# Patient Record
Sex: MALE | Race: WHITE | NOT HISPANIC OR LATINO | Employment: OTHER | URBAN - METROPOLITAN AREA
[De-identification: names, ages, dates, MRNs, and addresses within clinical notes are randomized per-mention and may not be internally consistent; named-entity substitution may affect disease eponyms.]

---

## 2017-01-03 ENCOUNTER — OFFICE VISIT (OUTPATIENT)
Dept: FAMILY MEDICINE | Facility: CLINIC | Age: 82
End: 2017-01-03
Payer: COMMERCIAL

## 2017-01-03 ENCOUNTER — TRANSFERRED RECORDS (OUTPATIENT)
Dept: HEALTH INFORMATION MANAGEMENT | Facility: CLINIC | Age: 82
End: 2017-01-03

## 2017-01-03 VITALS
DIASTOLIC BLOOD PRESSURE: 86 MMHG | TEMPERATURE: 97.7 F | OXYGEN SATURATION: 97 % | WEIGHT: 271.3 LBS | SYSTOLIC BLOOD PRESSURE: 122 MMHG | HEART RATE: 83 BPM | BODY MASS INDEX: 37.32 KG/M2

## 2017-01-03 DIAGNOSIS — Z87.820 SIGNIFICANT CLOSED HEAD TRAUMA WITHIN PAST 3 MONTHS: ICD-10-CM

## 2017-01-03 DIAGNOSIS — R11.0 NAUSEA: Primary | ICD-10-CM

## 2017-01-03 DIAGNOSIS — M54.2 CERVICALGIA: ICD-10-CM

## 2017-01-03 PROCEDURE — 99214 OFFICE O/P EST MOD 30 MIN: CPT | Performed by: FAMILY MEDICINE

## 2017-01-03 RX ORDER — MECLIZINE HYDROCHLORIDE 25 MG/1
25 TABLET ORAL EVERY 6 HOURS PRN
Qty: 30 TABLET | Refills: 3 | Status: SHIPPED | OUTPATIENT
Start: 2017-01-03 | End: 2017-06-20

## 2017-01-03 RX ORDER — TRAMADOL HYDROCHLORIDE 50 MG/1
50-100 TABLET ORAL EVERY 6 HOURS PRN
Qty: 20 TABLET | Refills: 0 | Status: SHIPPED | OUTPATIENT
Start: 2017-01-03 | End: 2017-06-20

## 2017-01-03 RX ORDER — MECLIZINE HYDROCHLORIDE 25 MG/1
25 TABLET ORAL EVERY 6 HOURS PRN
Qty: 30 TABLET | Refills: 3 | Status: SHIPPED | OUTPATIENT
Start: 2017-01-03 | End: 2017-01-03

## 2017-01-03 NOTE — PROGRESS NOTES
SUBJECTIVE:                                                    Issac Zelaya is a 81 year old male who presents to clinic today for the following health issues:    Wei here with sone and wife had a slip and fall on his own drive and hit occiput while on coumadin getting a subdural hematoma and residual ataxia and headache     Hospital Follow-up Visit:    Hospital/Nursing Home/IP Rehab Facility: [facility]  Date of Admission: 12/25/16  Date of Discharge: 12/28/16  Reason(s) for Admission: concussion  - nausea, severe headaches, stopped taking Oxycodone due to him making him sick, bleeding of the brain            Problems taking medications regularly:  None       Medication changes since discharge: None       Problems adhering to non-medication therapy:  None    Summary of hospitalization:  [facility] hospital discharge summary reviewed  Diagnostic Tests/Treatments reviewed.  Follow up needed: none  Other Healthcare Providers Involved in Patient s Care:         None  Update since discharge: fluctuating course. Ataxia still profound, headache still limitin     .Suture removal with 5 sutures removed from well adherant wound without signs of infection.   Injured by fall  CMS is intact. Sutures placed at [facility]  wound looks well adherant   A/P  special precautions include laterals tress and moisture stress for 7 days  Avoid wound stress and unnecessary moisture.  JM      Post Discharge Medication Reconciliation: discharge medications reconciled, continue medications without change.  Plan of care communicated with patient and family     Coding guidelines for this visit:  Type of Medical   Decision Making Face-to-Face Visit       within 7 Days of discharge Face-to-Face Visit        within 14 days of discharge   Moderate Complexity 10747 [id number]   High Complexity 40256 64027            Past Medical History   Diagnosis Date     Carotid artery disease (H)      Hypertension      Unspecified cerebral artery occlusion with  cerebral infarction      Coronary artery disease      Atrial fibrillation (H)        Past Surgical History   Procedure Laterality Date     Colonoscopy  9/24/2013     Dr. Yas CARMEN bypass graft othr,carotid       carotid stent       Family History   Problem Relation Age of Onset     HEART DISEASE Mother      DIABETES Father      Breast Cancer No family hx of        Social History   Substance Use Topics     Smoking status: Former Smoker     Quit date: 01/01/1982     Smokeless tobacco: Never Used     Alcohol Use: 0.0 oz/week     0 Standard drinks or equivalent per week      Comment: 2-3 drinks per week         Problem list and histories reviewed & adjusted, as indicated.  Additional history: as documented    BP Readings from Last 3 Encounters:   01/03/17 122/86   09/12/16 120/70   07/27/16 122/80    Wt Readings from Last 3 Encounters:   01/03/17 271 lb 4.8 oz (123.061 kg)   09/12/16 275 lb (124.739 kg)   07/27/16 273 lb 4.8 oz (123.968 kg)                    ROS:  Constitutional, HEENT, cardiovascular, pulmonary, gi and gu systems are negative, except as otherwise noted.    OBJECTIVE:                                                    /86 mmHg  Pulse 83  Temp(Src) 97.7  F (36.5  C) (Oral)  Wt 271 lb 4.8 oz (123.061 kg)  SpO2 97%  Body mass index is 37.32 kg/(m^2).  GENERAL: healthy, alert and no distress  EYES: Eyes grossly normal to inspection, PERRL and conjunctivae and sclerae normal  HENT: ear canals and TM's normal, nose and mouth without ulcers or lesions  NECK: no adenopathy, no asymmetry, masses, or scars and thyroid normal to palpation  RESP: lungs clear to auscultation - no rales, rhonchi or wheezes  CV: regular rate and rhythm, normal S1 S2, no S3 or S4, no murmur, click or rub, no peripheral edema and peripheral pulses strong  ABDOMEN: soft, nontender, no hepatosplenomegaly, no masses and bowel sounds normal  MS: no gross musculoskeletal defects noted, no edema  SKIN: no suspicious lesions  or rashes  NEURO: Normal strength and tone, mentation intact and speech normal  PSYCH: mentation appears normal, affect normal/bright    Ct scan showed the subdural: he follows at  [facility]      ASSESSMENT/PLAN:                                                    Has [facility] carotid stenosis      One week follow up to recheck sutrues and ataxia, Neurology will direct his rehab     (R11.0) Nausea  (primary encounter diagnosis)  Comment:   Plan: meclizine (ANTIVERT) 25 MG tablet,         DISCONTINUED: meclizine (ANTIVERT) 25 MG tablet        Post concussion    (Z87.820) Significant closed head trauma within past 3 months  Comment:   Plan: subdural care    (M54.2) Cervicalgia  Comment:   Plan: traMADol (ULTRAM) 50 MG tablet        whiplash              James Fajardo MD  [facility]\

## 2017-01-03 NOTE — NURSING NOTE
"Chief Complaint   Patient presents with     Hospital F/U       Initial /86 mmHg  Pulse 83  Temp(Src) 97.7  F (36.5  C) (Oral)  Wt 271 lb 4.8 oz (123.061 kg)  SpO2 97% Estimated body mass index is 37.32 kg/(m^2) as calculated from the following:    Height as of 9/12/16: 5' 11.5\" (1.816 m).    Weight as of this encounter: 271 lb 4.8 oz (123.061 kg).  BP completed using cuff size: eileen Camejo MA    "

## 2017-01-03 NOTE — MR AVS SNAPSHOT
After Visit Summary   1/3/2017    Issac Zelaya    MRN: [id number]           Patient Information     Date Of Birth          1935        Visit Information        Provider Department      1/3/2017 11:00 AM James Fajardo MD [facility]        Today's Diagnoses     Nausea    -  1     Significant closed head trauma within past 3 months         Cervicalgia            Follow-ups after your visit        Who to contact     If you have questions or need follow up information about today's clinic visit or your schedule please contact [facility] directly at [phone].  Normal or non-critical lab and imaging results will be communicated to you by [employer], letter or phone within 4 business days after the clinic has received the results. If you do not hear from us within 7 days, please contact the clinic through [employer] or phone. If you have a critical or abnormal lab result, we will notify you by phone as soon as possible.  Submit refill requests through [employer] or call your pharmacy and they will forward the refill request to us. Please allow 3 business days for your refill to be completed.          Additional Information About Your Visit        MyChart Information     [employer] gives you secure access to your electronic health record. If you see a primary care provider, you can also send messages to your care team and make appointments. If you have questions, please call your primary care clinic.  If you do not have a primary care provider, please call [phone] and they will assist you.        Care EveryWhere ID     This is your Care EveryWhere ID. This could be used by other organizations to access your [facility] medical records  [id number]        Your Vitals Were     Pulse Temperature Pulse Oximetry             83 97.7  F (36.5  C) (Oral) 97%          Blood Pressure from Last 3 Encounters:   01/03/17 122/86   09/12/16 120/70   07/27/16 122/80    Weight  from Last 3 Encounters:   01/03/17 271 lb 4.8 oz (123.061 kg)   09/12/16 275 lb (124.739 kg)   07/27/16 273 lb 4.8 oz (123.968 kg)              Today, you had the following     No orders found for display         Today's Medication Changes          These changes are accurate as of: 1/3/17 12:21 PM.  If you have any questions, ask your nurse or doctor.               Start taking these medicines.        Dose/Directions    meclizine 25 MG tablet   Commonly known as:  ANTIVERT   Used for:  Nausea   Started by:  James Fajardo MD        Dose:  25 mg   Take 1 tablet (25 mg) by mouth every 6 hours as needed for dizziness   Quantity:  30 tablet   Refills:  3       traMADol 50 MG tablet   Commonly known as:  ULTRAM   Used for:  Cervicalgia   Started by:  James Fajardo MD        Dose:   mg   Take 1-2 tablets ( mg) by mouth every 6 hours as needed for pain maximum 6 tablet(s) per day   Quantity:  20 tablet   Refills:  0            Where to get your medicines      These medications were sent to [facility] - [address] [address]     Phone:  [phone]    - meclizine 25 MG tablet      Some of these will need a paper prescription and others can be bought over the counter.  Ask your nurse if you have questions.     Bring a paper prescription for each of these medications    - traMADol 50 MG tablet             Primary Care Provider Office Phone # Fax #    James Fajardo [phone]       [address] [address]        Thank you!     Thank you for choosing [facility]  for your care. Our goal is always to provide you with excellent care. Hearing back from our patients is one way we can continue to improve our services. Please take a few minutes to complete the written survey that you may receive in the mail after your visit with us.  Thank you!             Your Updated Medication List - Protect others around you: Learn how to safely use, store and throw away your medicines at www.disposemymeds.org.          This list is accurate as of: 1/3/17 12:21 PM.  Always use your most recent med list.                   Brand Name Dispense Instructions for use    atenolol 50 MG tablet    TENORMIN    135 tablet    1 tab in morning.       atorvastatin 20 MG tablet    LIPITOR    90 tablet    Take 1 tablet (20 mg) by mouth daily       B-12 PO      1000 mg daily       CENTRUM PO      None Entered       clopidogrel 75 MG tablet    PLAVIX    90 tablet    Take 1 tablet (75 mg) by mouth daily       FISH OIL PO      None Entered       furosemide 20 MG tablet    LASIX    90 tablet    Take 1 tablet (20 mg) by mouth daily       lisinopril 5 MG tablet    PRINIVIL/ZESTRIL    90 tablet    Take 1 tablet (5 mg) by mouth daily       meclizine 25 MG tablet    ANTIVERT    30 tablet    Take 1 tablet (25 mg) by mouth every 6 hours as needed for dizziness       rivaroxaban ANTICOAGULANT 15 MG Tabs tablet    XARELTO    90 tablet    Take 1 tablet (15 mg) by mouth daily (with dinner)       traMADol 50 MG tablet    ULTRAM    20 tablet    Take 1-2 tablets ( mg) by mouth every 6 hours as needed for pain maximum 6 tablet(s) per day       VITAMIN D3 PO      Take 5,000 Units by mouth daily

## 2017-01-06 ENCOUNTER — DOCUMENTATION ONLY (OUTPATIENT)
Dept: CARE COORDINATION | Facility: CLINIC | Age: 82
End: 2017-01-06

## 2017-01-10 ENCOUNTER — OFFICE VISIT (OUTPATIENT)
Dept: FAMILY MEDICINE | Facility: CLINIC | Age: 82
End: 2017-01-10
Payer: COMMERCIAL

## 2017-01-10 VITALS
HEART RATE: 53 BPM | RESPIRATION RATE: 12 BRPM | TEMPERATURE: 97.5 F | WEIGHT: 266.8 LBS | BODY MASS INDEX: 36.7 KG/M2 | SYSTOLIC BLOOD PRESSURE: 128 MMHG | DIASTOLIC BLOOD PRESSURE: 74 MMHG

## 2017-01-10 DIAGNOSIS — E87.1 HYPONATREMIA: Primary | ICD-10-CM

## 2017-01-10 DIAGNOSIS — M54.2 NECK PAIN: ICD-10-CM

## 2017-01-10 LAB
ANION GAP SERPL CALCULATED.3IONS-SCNC: 4 MMOL/L (ref 3–14)
BUN SERPL-MCNC: 10 MG/DL (ref 7–30)
CALCIUM SERPL-MCNC: 9.5 MG/DL (ref 8.5–10.1)
CHLORIDE SERPL-SCNC: 104 MMOL/L (ref 94–109)
CO2 SERPL-SCNC: 30 MMOL/L (ref 20–32)
CREAT SERPL-MCNC: 1.03 MG/DL (ref 0.66–1.25)
GFR SERPL CREATININE-BSD FRML MDRD: 69 ML/MIN/1.7M2
GLUCOSE SERPL-MCNC: 103 MG/DL (ref 70–99)
POTASSIUM SERPL-SCNC: 4.5 MMOL/L (ref 3.4–5.3)
SODIUM SERPL-SCNC: 138 MMOL/L (ref 133–144)

## 2017-01-10 PROCEDURE — 80048 BASIC METABOLIC PNL TOTAL CA: CPT | Performed by: FAMILY MEDICINE

## 2017-01-10 PROCEDURE — 99213 OFFICE O/P EST LOW 20 MIN: CPT | Performed by: FAMILY MEDICINE

## 2017-01-10 PROCEDURE — 36415 COLL VENOUS BLD VENIPUNCTURE: CPT | Performed by: FAMILY MEDICINE

## 2017-01-10 RX ORDER — HYDROCODONE BITARTRATE AND ACETAMINOPHEN 5; 325 MG/1; MG/1
TABLET ORAL
Qty: 18 TABLET | Refills: 0 | Status: SHIPPED | OUTPATIENT
Start: 2017-01-10 | End: 2017-06-20

## 2017-01-10 NOTE — PROGRESS NOTES
SUBJECTIVE:                                                    Issac Zelaya is a 81 year old male who presents to clinic today for the following health issues:          Hospital Follow-up Visit:    He had low sodium post closed head trauma    Hospital/Nursing Home/IP Rehab Facility: [facility]  Date of Admission: 12/25/16  Date of Discharge: 12/28/16  Reason(s) for Admission: slipped on ice, concussion            Problems taking medications regularly:  None       Medication changes since discharge: yes       Problems adhering to non-medication therapy:  None      Summary of hospitalization:  [facility] discharge summary reviewed  Diagnostic Tests/Treatments reviewed.  Follow up needed: none  Other Healthcare Providers Involved in Patient s Care:         Specialist appointment - Neurology  Update since discharge: improved.     Post Discharge Medication Reconciliation: discharge medications reconciled, continue medications without change.  Plan of care communicated with patient     Coding guidelines for this visit:  Type of Medical   Decision Making Face-to-Face Visit       within 7 Days of discharge Face-to-Face Visit        within 14 days of discharge   Moderate Complexity 46543 93578   High Complexity 54191 95814                Problem list and histories reviewed & adjusted, as indicated.  Additional history: as documented    BP Readings from Last 3 Encounters:   01/10/17 128/74   01/03/17 122/86   09/12/16 120/70    Wt Readings from Last 3 Encounters:   01/10/17 266 lb 12.8 oz (121.02 kg)   01/03/17 271 lb 4.8 oz (123.061 kg)   09/12/16 275 lb (124.739 kg)                    ROS:  Constitutional, HEENT, cardiovascular, pulmonary, gi and gu systems are negative, except as otherwise noted.    OBJECTIVE:                                                    /74 mmHg  Pulse 53  Temp(Src) 97.5  F (36.4  C) (Oral)  Resp 12  Wt 266 lb 12.8 oz (121.02 kg)  Body mass index is 36.7 kg/(m^2).    GENERAL: healthy, alert and no distress  EYES: Eyes grossly normal to inspection, PERRL and conjunctivae and sclerae normal  HENT: ear canals and TM's normal, nose and mouth without ulcers or lesions  NECK: no adenopathy, no asymmetry, masses, or scars and thyroid normal to palpation  RESP: lungs clear to auscultation - no rales, rhonchi or wheezes  CV: regular rate and rhythm, normal S1 S2, no S3 or S4, no murmur, click or rub, no peripheral edema and peripheral pulses strong  ABDOMEN: soft, nontender, no hepatosplenomegaly, no masses and bowel sounds normal  MS: no gross musculoskeletal defects noted, no edema  SKIN: no suspicious lesions or rashes  NEURO: Normal strength and tone, mentation intact and speech normal  PSYCH: mentation appears normal, affect normal/bright         ASSESSMENT:                                                    Hyponatremia improving     PLAN:                                                    1. Hyponatremia  2 Closed head trauma: scalp sutures removed  - Basic metabolic panel  (Ca, Cl, CO2, Creat, Gluc, K, Na, BUN)    2. Neck pain  Doing better   - HYDROcodone-acetaminophen (NORCO) 5-325 MG per tablet; Take one three times daily as needed  Dispense: 18 tablet; Refill: 0            James Fajardo MD  [facility]

## 2017-01-10 NOTE — NURSING NOTE
"Chief Complaint   Patient presents with     Hospital F/U       Initial /74 mmHg  Pulse 53  Temp(Src) 97.5  F (36.4  C) (Oral)  Resp 12  Wt 266 lb 12.8 oz (121.02 kg) Estimated body mass index is 36.7 kg/(m^2) as calculated from the following:    Height as of 9/12/16: 5' 11.5\" (1.816 m).    Weight as of this encounter: 266 lb 12.8 oz (121.02 kg).  BP completed using cuff size: eileen Merchant CMA       "

## 2017-01-24 ENCOUNTER — OFFICE VISIT (OUTPATIENT)
Dept: FAMILY MEDICINE | Facility: CLINIC | Age: 82
End: 2017-01-24
Payer: COMMERCIAL

## 2017-01-24 VITALS
TEMPERATURE: 98.4 F | BODY MASS INDEX: 36.37 KG/M2 | RESPIRATION RATE: 12 BRPM | DIASTOLIC BLOOD PRESSURE: 73 MMHG | WEIGHT: 264.4 LBS | HEART RATE: 64 BPM | SYSTOLIC BLOOD PRESSURE: 133 MMHG

## 2017-01-24 DIAGNOSIS — I65.22 STENOSIS OF LEFT CAROTID ARTERY: ICD-10-CM

## 2017-01-24 DIAGNOSIS — R60.0 LOCALIZED EDEMA: Primary | ICD-10-CM

## 2017-01-24 DIAGNOSIS — I48.0 PAROXYSMAL ATRIAL FIBRILLATION (H): ICD-10-CM

## 2017-01-24 PROBLEM — Z87.820 SIGNIFICANT CLOSED HEAD TRAUMA WITHIN PAST 3 MONTHS: Status: ACTIVE | Noted: 2017-01-24

## 2017-01-24 PROBLEM — S06.5XAA SUBDURAL HEMATOMA (H): Status: ACTIVE | Noted: 2017-01-24

## 2017-01-24 PROCEDURE — 99214 OFFICE O/P EST MOD 30 MIN: CPT | Performed by: FAMILY MEDICINE

## 2017-01-24 RX ORDER — OMEGA-3 FATTY ACIDS/FISH OIL 300-1000MG
2 CAPSULE ORAL
COMMUNITY
End: 2023-02-23

## 2017-01-24 RX ORDER — MECLIZINE HYDROCHLORIDE 25 MG/1
25 TABLET ORAL
COMMUNITY
End: 2017-01-24

## 2017-01-24 RX ORDER — MULTIVIT-MIN/IRON FUM/FOLIC AC 7.5 MG-4
TABLET ORAL
COMMUNITY
End: 2017-06-27

## 2017-01-24 RX ORDER — LISINOPRIL 5 MG/1
5 TABLET ORAL
COMMUNITY
End: 2017-01-24

## 2017-01-24 RX ORDER — FUROSEMIDE 20 MG
20 TABLET ORAL
COMMUNITY
End: 2017-01-24

## 2017-01-24 RX ORDER — HYDROCODONE BITARTRATE AND ACETAMINOPHEN 5; 325 MG/1; MG/1
TABLET ORAL
COMMUNITY
Start: 2017-01-04 | End: 2017-01-24

## 2017-01-24 NOTE — NURSING NOTE
"Chief Complaint   Patient presents with     RECHECK     check excision site on head       Initial /73 mmHg  Pulse 64  Temp(Src) 98.4  F (36.9  C) (Oral)  Resp 12  Wt 264 lb 6.4 oz (119.931 kg) Estimated body mass index is 36.37 kg/(m^2) as calculated from the following:    Height as of 9/12/16: 5' 11.5\" (1.816 m).    Weight as of this encounter: 264 lb 6.4 oz (119.931 kg).  BP completed using cuff size: regular  Jose Merchant CMA       "

## 2017-01-24 NOTE — PROGRESS NOTES
SUBJECTIVE:                                                    Issac Zelaya is a 82 year old male who presents to clinic today for the following health issues:      SUBJECTIVE:    CC: Issac Zelaya is a 82 year old male who presents for follow up closed head trauma with subdural hematoma and a gratifying recovery.    HPI: he had low sodium but since corrected is generally doing well, balance improved, strength is symmetrical        PROBLEM LIST:                   Patient Active Problem List   Diagnosis     Obesity     CARDIOVASCULAR SCREENING; LDL GOAL LESS THAN 130     Generalized atherosclerosis     Hypertension goal BP (blood pressure) < 140/90     Carotid artery stenosis     Atrial fibrillation (H)     Aneurysm of thoracic aorta (H)     Localized edema     Subdural hematoma (H)     Significant closed head trauma within past 3 months       PAST MEDICAL HISTORY:                  Past Medical History   Diagnosis Date     Carotid artery disease (H)      Hypertension      Unspecified cerebral artery occlusion with cerebral infarction      Coronary artery disease      Atrial fibrillation (H)        PAST SURGICAL HISTORY:                  Past Surgical History   Procedure Laterality Date     Colonoscopy  9/24/2013     Dr. Yas CARMEN bypass graft othr,carotid       carotid stent       CURRENT MEDICATIONS:                  Current Outpatient Prescriptions   Medication Sig Dispense Refill     Multiple Vitamins-Minerals (MULTI-VITAMIN/MINERALS) TABS        omega 3 1000 MG CAPS Take 2 g by mouth       HYDROcodone-acetaminophen (NORCO) 5-325 MG per tablet Take one three times daily as needed 18 tablet 0     traMADol (ULTRAM) 50 MG tablet Take 1-2 tablets ( mg) by mouth every 6 hours as needed for pain maximum 6 tablet(s) per day 20 tablet 0     meclizine (ANTIVERT) 25 MG tablet Take 1 tablet (25 mg) by mouth every 6 hours as needed for dizziness 30 tablet 3     Cholecalciferol (VITAMIN D3 PO) Take 5,000 Units  by mouth daily       atenolol (TENORMIN) 50 MG tablet 1 tab in morning. 135 tablet 3     atorvastatin (LIPITOR) 20 MG tablet Take 1 tablet (20 mg) by mouth daily 90 tablet 3     furosemide (LASIX) 20 MG tablet Take 1 tablet (20 mg) by mouth daily 90 tablet 3     clopidogrel (PLAVIX) 75 MG tablet Take 1 tablet (75 mg) by mouth daily 90 tablet 3     lisinopril (PRINIVIL,ZESTRIL) 5 MG tablet Take 1 tablet (5 mg) by mouth daily 90 tablet 3     rivaroxaban ANTICOAGULANT (XARELTO) 15 MG TABS tablet Take 1 tablet (15 mg) by mouth daily (with dinner) 90 tablet 3     FISH OIL OR None Entered       B-12 OR 1000 mg daily       CENTRUM OR None Entered       [DISCONTINUED] furosemide (LASIX) 20 MG tablet Take 20 mg by mouth       [DISCONTINUED] lisinopril (PRINIVIL/ZESTRIL) 5 MG tablet Take 5 mg by mouth               FAMILY HISTORY:                   Family History   Problem Relation Age of Onset     HEART DISEASE Mother      DIABETES Father      Breast Cancer No family hx of        HEALTH MAINTENANCE:                    REVIEW OF OUTSIDE RECORDS: NO    REVIEW OF SYSTEMS:  C: NEGATIVE for fever, chills  E: NEGATIVE for vision changes   R: NEGATIVE for significant cough or SOB  CV: NEGATIVE for chest pain, palpitations   GI: NEGATIVE for nausea, abdominal pain, heartburn, or change in bowel habits  : NEGATIVE for frequency, dysuria, or hematuria  M: NEGATIVE for significant arthralgias or myalgia  N: NEGATIVE for weakness, dizziness or paresthesias or headache  NVS:no headache or balance issus  INTEG:no moles or new rashes  LYMPH:no nodes or night sweats    EXAM:    /73 mmHg  Pulse 64  Temp(Src) 98.4  F (36.9  C) (Oral)  Resp 12  Wt 264 lb 6.4 oz (119.931 kg)  GENERAL APPEARANCE: healthy, alert and no distress   EXAM:  GENERAL APPEARANCE: healthy, alert and no distress  EYES: EOMI,  PERRL  HENT: ear canals and TM's normal and nose and mouth without ulcers or lesions  RESP: lungs clear to auscultation - no rales,  rhonchi or wheezes  CV: regular rates and rhythm, normal S1 S2, no S3 or S4 and no murmur, click or rub -  ABDOMEN:  soft, nontender, no HSM or masses and bowel sounds normal  BACK:no tenderness or pain on straight let raise  Romberg's test is negative         ASSESSMENT/PLAN  1. Localized edema    2. Paroxysmal atrial fibrillation (H)    3. Stenosis of left carotid artery      See Neurology   Has pending ct head recheck     See here after that.                               I have discussed with patient the risks, benefits, medications, treatment options and modalities.   I have instructed the patient to call or schedule a follow-up appointment if any problems or failure to improve.

## 2017-02-01 ENCOUNTER — TELEPHONE (OUTPATIENT)
Dept: FAMILY MEDICINE | Facility: CLINIC | Age: 82
End: 2017-02-01

## 2017-02-13 ENCOUNTER — TRANSFERRED RECORDS (OUTPATIENT)
Dept: HEALTH INFORMATION MANAGEMENT | Facility: CLINIC | Age: 82
End: 2017-02-13

## 2017-02-20 ENCOUNTER — E-VISIT (OUTPATIENT)
Dept: FAMILY MEDICINE | Facility: CLINIC | Age: 82
End: 2017-02-20

## 2017-02-20 DIAGNOSIS — I62.00 SUBDURAL HEMORRHAGE (H): Primary | ICD-10-CM

## 2017-05-22 ENCOUNTER — TELEPHONE (OUTPATIENT)
Dept: CARDIOLOGY | Facility: CLINIC | Age: 82
End: 2017-05-22

## 2017-05-22 NOTE — TELEPHONE ENCOUNTER
Patient called from scheduling and transferred to Dr. Tavares team. Was questioning why he needed a CT done. Had several CT's and MRI's done since [place] due to a fall. Wondering if what we need is on these CT's.   Reviewed care everywhere.   CT ordered by Dr. 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   Patient was transferred back to scheduling to make annual OV with Dr. Clemens along with a CT (to be done prior to appointment).

## 2017-05-24 DIAGNOSIS — I71.20 ANEURYSM OF THORACIC AORTA (H): Primary | ICD-10-CM

## 2017-06-01 ENCOUNTER — HOSPITAL ENCOUNTER (OUTPATIENT)
Dept: CT IMAGING | Facility: CLINIC | Age: 82
Discharge: HOME | End: 2017-06-01
Attending: INTERNAL MEDICINE | Admitting: INTERNAL MEDICINE
Payer: COMMERCIAL

## 2017-06-01 DIAGNOSIS — I71.20 ANEURYSM OF THORACIC AORTA (H): ICD-10-CM

## 2017-06-01 LAB
CREAT BLD-MCNC: 1.1 MG/DL (ref 0.66–1.25)
GFR SERPL CREATININE-BSD FRML MDRD: 64 ML/MIN/1.7M2

## 2017-06-01 PROCEDURE — 25000128 H RX IP 250 OP 636: Performed by: RADIOLOGY

## 2017-06-01 PROCEDURE — 71275 CT ANGIOGRAPHY CHEST: CPT

## 2017-06-01 PROCEDURE — 82565 ASSAY OF CREATININE: CPT

## 2017-06-01 RX ORDER — IOPAMIDOL 755 MG/ML
500 INJECTION, SOLUTION INTRAVASCULAR ONCE
Status: COMPLETED | OUTPATIENT
Start: 2017-06-01 | End: 2017-06-01

## 2017-06-01 RX ADMIN — IOPAMIDOL 125 ML: 755 INJECTION, SOLUTION INTRAVENOUS at 10:46

## 2017-06-01 RX ADMIN — SODIUM CHLORIDE 80 ML: 9 INJECTION, SOLUTION INTRAVENOUS at 10:46

## 2017-06-20 ENCOUNTER — OFFICE VISIT (OUTPATIENT)
Dept: FAMILY MEDICINE | Facility: CLINIC | Age: 82
End: 2017-06-20
Payer: COMMERCIAL

## 2017-06-20 VITALS
SYSTOLIC BLOOD PRESSURE: 138 MMHG | HEART RATE: 65 BPM | RESPIRATION RATE: 14 BRPM | TEMPERATURE: 97.9 F | HEIGHT: 72 IN | BODY MASS INDEX: 36.87 KG/M2 | WEIGHT: 272.2 LBS | DIASTOLIC BLOOD PRESSURE: 88 MMHG | OXYGEN SATURATION: 93 %

## 2017-06-20 DIAGNOSIS — I48.20 CHRONIC ATRIAL FIBRILLATION (H): ICD-10-CM

## 2017-06-20 DIAGNOSIS — I69.990: ICD-10-CM

## 2017-06-20 DIAGNOSIS — I10 HYPERTENSION GOAL BP (BLOOD PRESSURE) < 140/90: Primary | ICD-10-CM

## 2017-06-20 DIAGNOSIS — I65.22 STENOSIS OF LEFT CAROTID ARTERY: ICD-10-CM

## 2017-06-20 PROBLEM — E66.01 MORBID OBESITY (H): Status: ACTIVE | Noted: 2017-06-20

## 2017-06-20 LAB
ALBUMIN SERPL-MCNC: 3.5 G/DL (ref 3.4–5)
ALP SERPL-CCNC: 45 U/L (ref 40–150)
ALT SERPL W P-5'-P-CCNC: 19 U/L (ref 0–70)
ANION GAP SERPL CALCULATED.3IONS-SCNC: 9 MMOL/L (ref 3–14)
AST SERPL W P-5'-P-CCNC: 13 U/L (ref 0–45)
BILIRUB SERPL-MCNC: 0.7 MG/DL (ref 0.2–1.3)
BUN SERPL-MCNC: 18 MG/DL (ref 7–30)
CALCIUM SERPL-MCNC: 9.1 MG/DL (ref 8.5–10.1)
CHLORIDE SERPL-SCNC: 105 MMOL/L (ref 94–109)
CHOLEST SERPL-MCNC: 112 MG/DL
CO2 SERPL-SCNC: 26 MMOL/L (ref 20–32)
CREAT SERPL-MCNC: 1.09 MG/DL (ref 0.66–1.25)
GFR SERPL CREATININE-BSD FRML MDRD: 65 ML/MIN/1.7M2
GLUCOSE SERPL-MCNC: 117 MG/DL (ref 70–99)
HDLC SERPL-MCNC: 34 MG/DL
LDLC SERPL CALC-MCNC: 50 MG/DL
NONHDLC SERPL-MCNC: 78 MG/DL
POTASSIUM SERPL-SCNC: 4.5 MMOL/L (ref 3.4–5.3)
PROT SERPL-MCNC: 7.2 G/DL (ref 6.8–8.8)
SODIUM SERPL-SCNC: 140 MMOL/L (ref 133–144)
TRIGL SERPL-MCNC: 139 MG/DL

## 2017-06-20 PROCEDURE — 80061 LIPID PANEL: CPT | Performed by: FAMILY MEDICINE

## 2017-06-20 PROCEDURE — 80053 COMPREHEN METABOLIC PANEL: CPT | Performed by: FAMILY MEDICINE

## 2017-06-20 PROCEDURE — 99214 OFFICE O/P EST MOD 30 MIN: CPT | Performed by: FAMILY MEDICINE

## 2017-06-20 PROCEDURE — 36415 COLL VENOUS BLD VENIPUNCTURE: CPT | Performed by: FAMILY MEDICINE

## 2017-06-20 NOTE — PROGRESS NOTES
SUBJECTIVE:                                                    Issac Zelaya is a 82 year old male who presents to clinic today for the following health issues:  SUBJECTIVE:    CC: Issac Zelaya is a 82 year old male who presents for asvd follow up and past head trauma six months ago     HPI: improving month by month, positive attitude, clear headed         PROBLEM LIST:                   Patient Active Problem List   Diagnosis     Obesity     CARDIOVASCULAR SCREENING; LDL GOAL LESS THAN 130     Generalized atherosclerosis     Hypertension goal BP (blood pressure) < 140/90     Carotid artery stenosis     Atrial fibrillation (H)     Aneurysm of thoracic aorta (H)     Localized edema     Subdural hematoma (H)     Significant closed head trauma within past 3 months       PAST MEDICAL HISTORY:                  Past Medical History:   Diagnosis Date     Atrial fibrillation (H)      Carotid artery disease (H)      Coronary artery disease      Hypertension      Unspecified cerebral artery occlusion with cerebral infarction        PAST SURGICAL HISTORY:                  Past Surgical History:   Procedure Laterality Date     C BYPASS GRAFT OTHR,CAROTID      carotid stent     COLONOSCOPY  9/24/2013    Dr. Yas LAMB       CURRENT MEDICATIONS:                  Current Outpatient Prescriptions   Medication Sig Dispense Refill     Multiple Vitamins-Minerals (MULTI-VITAMIN/MINERALS) TABS        omega 3 1000 MG CAPS Take 2 g by mouth       atenolol (TENORMIN) 50 MG tablet 1 tab in morning. 135 tablet 3     atorvastatin (LIPITOR) 20 MG tablet Take 1 tablet (20 mg) by mouth daily 90 tablet 3     furosemide (LASIX) 20 MG tablet Take 1 tablet (20 mg) by mouth daily 90 tablet 3     clopidogrel (PLAVIX) 75 MG tablet Take 1 tablet (75 mg) by mouth daily 90 tablet 3     lisinopril (PRINIVIL,ZESTRIL) 5 MG tablet Take 1 tablet (5 mg) by mouth daily 90 tablet 3     rivaroxaban ANTICOAGULANT (XARELTO) 15 MG TABS tablet Take 1 tablet (15  "mg) by mouth daily (with dinner) 90 tablet 3     FISH OIL OR None Entered       B-12 OR 1000 mg daily       CENTRUM OR None Entered               FAMILY HISTORY:                   Family History   Problem Relation Age of Onset     HEART DISEASE Mother      DIABETES Father      Breast Cancer No family hx of        HEALTH MAINTENANCE:                    REVIEW OF OUTSIDE RECORDS: NO    REVIEW OF SYSTEMS:  C: NEGATIVE for fever, chills  E: NEGATIVE for vision changes   R: NEGATIVE for significant cough or SOB  CV: NEGATIVE for chest pain, palpitations   GI: NEGATIVE for nausea, abdominal pain, heartburn, or change in bowel habits  : NEGATIVE for frequency, dysuria, or hematuria  M: NEGATIVE for significant arthralgias or myalgia  N: remarkable for weakness, dizziness or paresthesias or headache  NVS:no headache or balance issus  INTEG:no moles or new rashes  LYMPH:no nodes or night sweats    EXAM:    /86 (BP Location: Left arm, Patient Position: Chair, Cuff Size: Adult Large)  Pulse 65  Temp 97.9  F (36.6  C) (Oral)  Resp 14  Ht 5' 11.5\" (1.816 m)  Wt 272 lb 3.2 oz (123.5 kg)  SpO2 93%  BMI 37.44 kg/m2  GENERAL APPEARANCE: healthy, alert and no distress   EXAM:  GENERAL APPEARANCE: healthy, alert and no distress  EYES: EOMI,  PERRL  HENT: ear canals and TM's normal and nose and mouth without ulcers or lesions  NECK: no adenopathy, no asymmetry, masses, or scars and thyroid normal to palpation  RESP: lungs clear to auscultation - no rales, rhonchi or wheezes  CV: regular rates and rhythm, normal S1 S2, no S3 or S4 and no murmur, click or rub -  ABDOMEN:  soft, nontender, no HSM or masses and bowel sounds normal  MS: extremities normal- no gross deformities noted, no evidence of inflammation in joints, FROM in all extremities.  SKIN: no suspicious lesions or rashes  NEURO: mentation intact and proprioception decreased since his head injury   PSYCH: mentation appears normal and affect " normal/bright  LYMPHATICS: No axillary, cervical, inguinal, or supraclavicular nodes  BACK:no tenderness or pain on straight let raise           ASSESSMENT/PLAN        (I10) Hypertension goal BP (blood pressure) < 140/90  (primary encounter diagnosis)  Comment: labile, has atrial fib and carotid stent   Plan: image the stent     (I65.22) Stenosis of left carotid artery  Comment: stent    Plan: US Carotid Bilateral            (I48.2) Chronic atrial fibrillation (H)  Comment:   Plan: Lipid panel reflex to direct LDL, Comprehensive        metabolic panel        anticoagulation    (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   I have instructed the patient to call or schedule a follow-up appointment if any problems or failure to improve.

## 2017-06-20 NOTE — NURSING NOTE
"Chief Complaint   Patient presents with     lab work     medication refills       Initial /86 (BP Location: Left arm, Patient Position: Chair, Cuff Size: Adult Large)  Pulse 65  Temp 97.9  F (36.6  C) (Oral)  Resp 14  Ht 5' 11.5\" (1.816 m)  Wt 272 lb 3.2 oz (123.5 kg)  SpO2 93%  BMI 37.44 kg/m2 Estimated body mass index is 37.44 kg/(m^2) as calculated from the following:    Height as of this encounter: 5' 11.5\" (1.816 m).    Weight as of this encounter: 272 lb 3.2 oz (123.5 kg).  Medication Reconciliation: complete   Jose Merchant CMA       "

## 2017-06-20 NOTE — MR AVS SNAPSHOT
After Visit Summary   6/20/2017    Issac Zelaya    MRN: [id number]           Patient Information     Date Of Birth          1935        Visit Information        Provider Department      6/20/2017 9:00 AM James Fajardo MD [facility]        Today's Diagnoses     Hypertension goal BP (blood pressure) < 140/90    -  1    Stenosis of left carotid artery        Chronic atrial fibrillation (H)        Apraxia following cerebrovascular disease            Follow-ups after your visit        Your next 10 appointments already scheduled     Jun 27, 2017 11:15 AM CDT   Return Visit with Dick Clemens MD   [facility] [place] ([facility])    [address] [place], [place] [id number]  [facility] [id number] [phone]              Future tests that were ordered for you today     Open Future Orders        Priority Expected Expires Ordered    US Carotid Bilateral Routine  6/20/2018 6/20/2017            Who to contact     If you have questions or need follow up information about today's clinic visit or your schedule please contact [facility] directly at [phone].  Normal or non-critical lab and imaging results will be communicated to you by Followaphart, letter or phone within 4 business days after the clinic has received the results. If you do not hear from us within 7 days, please contact the clinic through Followaphart or phone. If you have a critical or abnormal lab result, we will notify you by phone as soon as possible.  Submit refill requests through Emergent Views or call your pharmacy and they will forward the refill request to us. Please allow 3 business days for your refill to be completed.          Additional Information About Your Visit        MyChart Information     Emergent Views gives you secure access to your electronic health record. If you see a primary care provider, you can also send messages to your care team and make appointments.  "If you have questions, please call your primary care clinic.  If you do not have a primary care provider, please call [phone] and they will assist you.        Care EveryWhere ID     This is your Care EveryWhere ID. This could be used by other organizations to access your [place] medical records  [id number]        Your Vitals Were     Pulse Temperature Respirations Height Pulse Oximetry BMI (Body Mass Index)    65 97.9  F (36.6  C) (Oral) 14 5' 11.5\" (1.816 m) 93% 37.44 kg/m2       Blood Pressure from Last 3 Encounters:   06/20/17 138/88   01/24/17 133/73   01/10/17 128/74    Weight from Last 3 Encounters:   06/20/17 272 lb 3.2 oz (123.5 kg)   01/24/17 264 lb 6.4 oz (119.9 kg)   01/10/17 266 lb 12.8 oz (121 kg)              We Performed the Following     Comprehensive metabolic panel     Lipid panel reflex to direct LDL        Primary Care Provider Office Phone # Fax #    James Fajardo [phone]       [address] [address]        Thank you!     Thank you for choosing [facility]  for your care. Our goal is always to provide you with excellent care. Hearing back from our patients is one way we can continue to improve our services. Please take a few minutes to complete the written survey that you may receive in the mail after your visit with us. Thank you!             Your Updated Medication List - Protect others around you: Learn how to safely use, store and throw away your medicines at www.disposemymeds.org.          This list is accurate as of: 6/20/17  9:53 AM.  Always use your most recent med list.                   Brand Name Dispense Instructions for use    atenolol 50 MG tablet    TENORMIN    135 tablet    1 tab in morning.       atorvastatin 20 MG tablet    LIPITOR    90 tablet    Take 1 tablet (20 mg) by mouth daily       B-12 PO      1000 mg daily       * MULTI-VITAMIN/MINERALS Tabs          * CENTRUM PO      " None Entered       clopidogrel 75 MG tablet    PLAVIX    90 tablet    Take 1 tablet (75 mg) by mouth daily       * omega 3 1000 MG Caps      Take 2 g by mouth       * FISH OIL PO      None Entered       furosemide 20 MG tablet    LASIX    90 tablet    Take 1 tablet (20 mg) by mouth daily       lisinopril 5 MG tablet    PRINIVIL/ZESTRIL    90 tablet    Take 1 tablet (5 mg) by mouth daily       rivaroxaban ANTICOAGULANT 15 MG Tabs tablet    XARELTO    90 tablet    Take 1 tablet (15 mg) by mouth daily (with dinner)       * Notice:  This list has 4 medication(s) that are the same as other medications prescribed for you. Read the directions carefully, and ask your doctor or other care provider to review them with you.

## 2017-06-23 ENCOUNTER — HOSPITAL ENCOUNTER (OUTPATIENT)
Dept: ULTRASOUND IMAGING | Facility: CLINIC | Age: 82
Discharge: HOME | End: 2017-06-23
Attending: FAMILY MEDICINE | Admitting: FAMILY MEDICINE
Payer: COMMERCIAL

## 2017-06-23 DIAGNOSIS — I65.22 STENOSIS OF LEFT CAROTID ARTERY: ICD-10-CM

## 2017-06-23 PROCEDURE — 93880 EXTRACRANIAL BILAT STUDY: CPT

## 2017-06-27 ENCOUNTER — OFFICE VISIT (OUTPATIENT)
Dept: CARDIOLOGY | Facility: CLINIC | Age: 82
End: 2017-06-27
Payer: COMMERCIAL

## 2017-06-27 VITALS
WEIGHT: 273.2 LBS | HEIGHT: 72 IN | DIASTOLIC BLOOD PRESSURE: 70 MMHG | HEART RATE: 77 BPM | BODY MASS INDEX: 37 KG/M2 | SYSTOLIC BLOOD PRESSURE: 116 MMHG

## 2017-06-27 DIAGNOSIS — E78.5 HYPERLIPIDEMIA LDL GOAL <100: ICD-10-CM

## 2017-06-27 DIAGNOSIS — I65.22 STENOSIS OF LEFT CAROTID ARTERY: ICD-10-CM

## 2017-06-27 DIAGNOSIS — S06.5XAA SUBDURAL HEMATOMA (H): ICD-10-CM

## 2017-06-27 DIAGNOSIS — I10 ESSENTIAL HYPERTENSION, BENIGN: ICD-10-CM

## 2017-06-27 DIAGNOSIS — I10 HYPERTENSION GOAL BP (BLOOD PRESSURE) < 140/90: Primary | ICD-10-CM

## 2017-06-27 DIAGNOSIS — E66.01 MORBID OBESITY DUE TO EXCESS CALORIES (H): ICD-10-CM

## 2017-06-27 DIAGNOSIS — I10 ESSENTIAL HYPERTENSION WITH GOAL BLOOD PRESSURE LESS THAN 140/90: ICD-10-CM

## 2017-06-27 DIAGNOSIS — G47.33 OSA (OBSTRUCTIVE SLEEP APNEA): ICD-10-CM

## 2017-06-27 DIAGNOSIS — I48.20 CHRONIC ATRIAL FIBRILLATION (H): ICD-10-CM

## 2017-06-27 DIAGNOSIS — I71.20 THORACIC AORTIC ANEURYSM WITHOUT RUPTURE (H): ICD-10-CM

## 2017-06-27 PROCEDURE — 99214 OFFICE O/P EST MOD 30 MIN: CPT | Performed by: INTERNAL MEDICINE

## 2017-06-27 RX ORDER — ATORVASTATIN CALCIUM 20 MG/1
20 TABLET, FILM COATED ORAL DAILY
Qty: 90 TABLET | Refills: 3 | Status: SHIPPED | OUTPATIENT
Start: 2017-06-27 | End: 2018-08-02

## 2017-06-27 RX ORDER — FUROSEMIDE 20 MG
20 TABLET ORAL DAILY
Qty: 90 TABLET | Refills: 3 | Status: SHIPPED | OUTPATIENT
Start: 2017-06-27 | End: 2018-08-02

## 2017-06-27 RX ORDER — DULOXETIN HYDROCHLORIDE 60 MG/1
CAPSULE, DELAYED RELEASE ORAL DAILY
COMMUNITY
End: 2020-11-05

## 2017-06-27 RX ORDER — CLOPIDOGREL BISULFATE 75 MG/1
75 TABLET ORAL DAILY
Qty: 90 TABLET | Refills: 3 | Status: SHIPPED | OUTPATIENT
Start: 2017-06-27 | End: 2018-08-02

## 2017-06-27 RX ORDER — LISINOPRIL 5 MG/1
5 TABLET ORAL DAILY
Qty: 90 TABLET | Refills: 3 | Status: SHIPPED | OUTPATIENT
Start: 2017-06-27 | End: 2018-08-02

## 2017-06-27 RX ORDER — ATENOLOL 50 MG/1
TABLET ORAL
Qty: 135 TABLET | Refills: 3 | Status: SHIPPED | OUTPATIENT
Start: 2017-06-27 | End: 2018-08-02

## 2017-06-27 NOTE — MR AVS SNAPSHOT
After Visit Summary   6/27/2017    Issac Zelaya    MRN: [id number]           Patient Information     Date Of Birth          1935        Visit Information        Provider Department      6/27/2017 11:15 AM Dick Clemens MD [facility]        Today's Diagnoses     Hypertension goal BP (blood pressure) < 140/90    -  1    Morbid obesity due to excess calories (H)        Thoracic aortic aneurysm without rupture (H)        Stenosis of left carotid artery        Post fall => head tauma => SDH => [facility] hospital        Hyperlipidemia LDL goal <100        Stenosis of left carotid artery        DOMINGO (obstructive sleep apnea)        Essential hypertension, benign        Chronic atrial fibrillation (H)        Essential hypertension with goal blood pressure less than 140/90           Follow-ups after your visit        Additional Services     Follow-Up with Cardiologist                 Future tests that were ordered for you today     Open Future Orders        Priority Expected Expires Ordered    Follow-Up with Cardiologist Routine 6/27/2018 11/9/2018 6/27/2017            Who to contact     If you have questions or need follow up information about today's clinic visit or your schedule please contact [facility] directly at [phone].  Normal or non-critical lab and imaging results will be communicated to you by MyChart, letter or phone within 4 business days after the clinic has received the results. If you do not hear from us within 7 days, please contact the clinic through MyChart or phone. If you have a critical or abnormal lab result, we will notify you by phone as soon as possible.  Submit refill requests through TellFi or call your pharmacy and they will forward the refill request to us. Please allow 3 business days for your refill to be completed.          Additional Information About Your Visit        MyChart  "Information     [facility] gives you secure access to your electronic health record. If you see a primary care provider, you can also send messages to your care team and make appointments. If you have questions, please call your primary care clinic.  If you do not have a primary care provider, please call [phone] and they will assist you.        Care EveryWhere ID     This is your Care EveryWhere ID. This could be used by other organizations to access your [place] medical records  [id number]        Your Vitals Were     Pulse Height BMI (Body Mass Index)             77 1.816 m (5' 11.5\") 37.57 kg/m2          Blood Pressure from Last 3 Encounters:   06/27/17 116/70   06/20/17 138/88   01/24/17 133/73    Weight from Last 3 Encounters:   06/27/17 123.9 kg (273 lb 3.2 oz)   06/20/17 123.5 kg (272 lb 3.2 oz)   01/24/17 119.9 kg (264 lb 6.4 oz)                 Where to get your medicines      These medications were sent to [facility] [address] [address]     Phone:  [phone]     atenolol 50 MG tablet    atorvastatin 20 MG tablet    clopidogrel 75 MG tablet    furosemide 20 MG tablet    lisinopril 5 MG tablet    rivaroxaban ANTICOAGULANT 15 MG Tabs tablet          Primary Care Provider Office Phone # Fax #    James Acosta Fajardo [phone]       [address] [address]        Equal Access to Services     LIZETTE ABEL AH: Hadii aad ku hadasho Soomaali, waaxda luqadaha, qaybta kaalmada adeegyada, geraldine hickey. So [facility] [phone].    ATENCIÓN: Si habla español, tiene a spear disposición servicios gratuitos de asistencia lingüística. Llame al [phone].    We comply with applicable federal civil rights laws and [place] laws. We do not discriminate on the basis of race, color, national origin, age, disability sex, sexual orientation or gender " identity.            Thank you!     Thank you for choosing [facility] [place]  for your care. Our goal is always to provide you with excellent care. Hearing back from our patients is one way we can continue to improve our services. Please take a few minutes to complete the written survey that you may receive in the mail after your visit with us. Thank you!             Your Updated Medication List - Protect others around you: Learn how to safely use, store and throw away your medicines at www.disposemymeds.org.          This list is accurate as of: 6/27/17 12:00 PM.  Always use your most recent med list.                   Brand Name Dispense Instructions for use Diagnosis    atenolol 50 MG tablet    TENORMIN    135 tablet    1 tab in morning.    Essential hypertension with goal blood pressure less than 140/90, DOMINGO (obstructive sleep apnea), Essential hypertension, benign, Chronic atrial fibrillation (H)       atorvastatin 20 MG tablet    LIPITOR    90 tablet    Take 1 tablet (20 mg) by mouth daily    Hyperlipidemia LDL goal <100, Stenosis of left carotid artery       B-12 PO      1000 mg daily        CENTRUM PO      None Entered        clopidogrel 75 MG tablet    PLAVIX    90 tablet    Take 1 tablet (75 mg) by mouth daily    DOMINGO (obstructive sleep apnea), Essential hypertension, benign, Hypertension goal BP (blood pressure) < 140/90, Chronic atrial fibrillation (H)       DULoxetine 60 MG EC capsule    CYMBALTA     Take by mouth daily        furosemide 20 MG tablet    LASIX    90 tablet    Take 1 tablet (20 mg) by mouth daily    DOMINGO (obstructive sleep apnea)       lisinopril 5 MG tablet    PRINIVIL/ZESTRIL    90 tablet    Take 1 tablet (5 mg) by mouth daily    Essential hypertension, benign, DOMINGO (obstructive sleep apnea), Hypertension goal BP (blood pressure) < 140/90, Chronic atrial fibrillation (H)       omega 3 1000 MG Caps      Take 2 g by mouth        rivaroxaban ANTICOAGULANT 15  MG Tabs tablet    XARELTO    90 tablet    Take 1 tablet (15 mg) by mouth daily (with dinner)    Chronic atrial fibrillation (H), Essential hypertension, benign, Hypertension goal BP (blood pressure) < 140/90, DOMINGO (obstructive sleep apnea)

## 2017-06-27 NOTE — PROGRESS NOTES
HPI and Plan:   See dictation        No orders of the defined types were placed in this encounter.    Orders Placed This Encounter   Medications     DULoxetine (CYMBALTA) 60 MG EC capsule     Sig: Take by mouth daily     Medications Discontinued During This Encounter   Medication Reason     Multiple Vitamins-Minerals (MULTI-VITAMIN/MINERALS) TABS Medication Reconciliation Clean Up     FISH OIL OR Medication Reconciliation Clean Up         Encounter Diagnosis   Name Primary?     Hypertension goal BP (blood pressure) < 140/90 Yes       CURRENT MEDICATIONS:  Current Outpatient Prescriptions   Medication Sig Dispense Refill     DULoxetine (CYMBALTA) 60 MG EC capsule Take by mouth daily       omega 3 1000 MG CAPS Take 2 g by mouth       atenolol (TENORMIN) 50 MG tablet 1 tab in morning. 135 tablet 3     atorvastatin (LIPITOR) 20 MG tablet Take 1 tablet (20 mg) by mouth daily 90 tablet 3     furosemide (LASIX) 20 MG tablet Take 1 tablet (20 mg) by mouth daily 90 tablet 3     clopidogrel (PLAVIX) 75 MG tablet Take 1 tablet (75 mg) by mouth daily 90 tablet 3     lisinopril (PRINIVIL,ZESTRIL) 5 MG tablet Take 1 tablet (5 mg) by mouth daily 90 tablet 3     rivaroxaban ANTICOAGULANT (XARELTO) 15 MG TABS tablet Take 1 tablet (15 mg) by mouth daily (with dinner) 90 tablet 3     B-12 OR 1000 mg daily       CENTRUM OR None Entered         ALLERGIES     Allergies   Allergen Reactions     Epinephrine Shortness Of Breath     Very rapid heartrate     Dabigatran Itching       PAST MEDICAL HISTORY:  Past Medical History:   Diagnosis Date     Atrial fibrillation (H)      Carotid artery disease (H)      Coronary artery disease      Hypertension      Unspecified cerebral artery occlusion with cerebral infarction        PAST SURGICAL HISTORY:  Past Surgical History:   Procedure Laterality Date     C BYPASS GRAFT OTHR,CAROTID      carotid stent     COLONOSCOPY  9/24/2013    Dr. Yas LAMB       FAMILY HISTORY:  Family History   Problem  "Relation Age of Onset     HEART DISEASE Mother      DIABETES Father      Breast Cancer No family hx of        SOCIAL HISTORY:  Social History     Social History     Marital status:      Spouse name: N/A     Number of children: N/A     Years of education: N/A     Social History Main Topics     Smoking status: Former Smoker     Quit date: 1/1/1982     Smokeless tobacco: Never Used     Alcohol use 0.0 oz/week     0 Standard drinks or equivalent per week      Comment: 2-3 drinks per week     Drug use: No     Sexual activity: Not Currently     Partners: Female     Other Topics Concern     Caffeine Concern Yes     3 cups daily     Weight Concern Yes     Special Diet No     Exercise Yes     golfing 1 day per week     Social History Narrative         Review of Systems:  Skin:  Negative       Eyes:  Positive for glasses    ENT:  Negative      Respiratory:  Positive for dyspnea on exertion;CPAP;sleep apnea     Cardiovascular:    edema;Positive for improved  Gastroenterology: Negative      Genitourinary:  not assessed      Musculoskeletal:  Positive for joint stiffness;joint swelling;joint pain    Neurologic:  Positive for numbness or tingling of feet;numbness or tingling of hands occ  Psychiatric:  Negative      Heme/Lymph/Imm:  Negative      Endocrine:  Negative        Physical Exam:  Vitals: /70  Pulse 77  Ht 1.816 m (5' 11.5\")  Wt 123.9 kg (273 lb 3.2 oz)  BMI 37.57 kg/m2    Constitutional:           Skin:           Head:           Eyes:           ENT:           Neck:           Chest:             Cardiac:                    Abdomen:           Vascular:                                          Extremities and Back:                 Neurological:             Recent Lab Results:  LIPID RESULTS:  Lab Results   Component Value Date    CHOL 112 06/20/2017    HDL 34 (L) 06/20/2017    LDL 50 06/20/2017    TRIG 139 06/20/2017    CHOLHDLRATIO 2.7 06/29/2015       LIVER ENZYME RESULTS:  Lab Results   Component Value " Date    AST 13 06/20/2017    ALT 19 06/20/2017       CBC RESULTS:  Lab Results   Component Value Date    WBC 7.6 05/13/2014    RBC 4.76 05/13/2014    HGB 16.2 05/13/2014    HCT 46.5 05/13/2014    MCV 98 05/13/2014    MCH 34.0 (H) 05/13/2014    MCHC 34.8 05/13/2014    RDW 13.5 05/13/2014     05/13/2014       BMP RESULTS:  Lab Results   Component Value Date     06/20/2017    POTASSIUM 4.5 06/20/2017    CHLORIDE 105 06/20/2017    CO2 26 06/20/2017    ANIONGAP 9 06/20/2017     (H) 06/20/2017    BUN 18 06/20/2017    CR 1.09 06/20/2017    GFRESTIMATED 65 06/20/2017    GFRESTBLACK 78 06/20/2017    FILIPE 9.1 06/20/2017        A1C RESULTS:  Lab Results   Component Value Date    A1C 5.6 04/15/2015       INR RESULTS:  No results found for: INR        CC  No referring provider defined for this encounter.

## 2017-06-27 NOTE — LETTER
DATE OF VISIT:  06/27/2017    James Fajardo MD  [id number] [facility] [id number]    Karthik Zelaya came today to review his longstanding relationship with chronic atrial fibrillation and hypertension, carotid artery disease with previous stenting back in [place] at [facility] in 2008.  The patient had a horrible fall this past January.  He was admitted to [facility] with severe scalp injury and a component of subdural hematoma.  He did not need surgical intervention but it was touch-and-go for awhile. He subsequently recovered with what he thinks is only minimal post-concussion syndrome issues.      At the time, he was on Plavix for his carotid stenosis - stenting intervention, and Xarelto 15 mg a day because of his chronic atrial fibrillation.  Subsequently he was placed back on both of these anticoagulants for the indicated reasons, and happily has not had any complications.      He denies chest pain, palpitations, dizziness or syncope.  His head trauma was from slipping on the ice.  Fortunately, nothing like that has happened subsequently.      Review of systems is listed in Epic.  I endorsed the findings including chronic shortness of breath on exertion and sleep apnea, although both are stable.  He has had chronic lower leg edema, but somewhat improved recently.  Upper and lower GI negative,  negative, musculoskeletal positive mild achiness of the joints, some tingling of the feet, and otherwise negative.      His current medications are Plavix 75 mg a day, Xarelto 15 mg a day, lisinopril 5 mg a day, Tenormin 50 mg daily, atorvastatin 20 mg at bedtime, and omega-3 fatty acids, B12 and vitamins.      His physical exam shows a moderately overweight male at 273 pounds, blood pressure 116/70, heart rate 70 beats per minute, and mildly irregular.  Head is normal.  He had no neck vein distention or bruits.  Heart was irregular without gallop or murmur.  Lungs clear.  Abdomen soft,  pendulous, nontender without pain or mass.  Extremities showed bilateral trace edema.  Neurologically, he is intact.      I discussed at length the indications for Xarelto with regard to his atrial fibrillation and with his chronic carotid disease and stenting.  One could certainly make an argument that he does not need Plavix in the long run.    I encouraged him to think about a neurology assessment, it has been many years since that assessment, but he has been comfortable on the Plavix and Xarelto, and would not have had problems had he not fallen.      He is taking Xarelto for his atrial fibrillation which is nicely controlled.  He is normotensive.  He looks well and feels well.      We agreed that for the time being, he would stay on the two anticoagulants, but should he have any further bleeding issues, one would at least consider stopping the Plavix and, if need be, the Xarelto.     IMPRESSION:   1.  Post head trauma, resolved subdural hematoma.   2.  Indication for Xarelto is chronic atrial fibrillation.  Indication for Plavix presumably is remote coronary disease and carotid stenting.  One could certainly justify stopping Plavix should there be ongoing concern.   3.  No obvious signs of angina or heart failure.      PLAN:  I will see him in a year.  I have reviewed his care to you.  His LDL is at goal at 50.  His renal function is normal.  Hemoglobin A1c 5.6.  Minus the fall, he was doing pretty well.  If you have any questions, concerns or disagreements, give me a call.     Over half an hour was spent reviewing multiple records, imaging, EKGs, monitors, medications, medication side effects, complications and multiple blood work assessments.     Thank you for allowing me to participate in the care of your patient.    Sincerely,     TEOFILO CASTILLO MD     [facility]

## 2017-07-11 ENCOUNTER — TELEPHONE (OUTPATIENT)
Dept: CARDIOLOGY | Facility: CLINIC | Age: 82
End: 2017-07-11

## 2017-07-11 NOTE — TELEPHONE ENCOUNTER
Phone call to patient to verify if dr. Clemens reviewed with him CT of chest. Dictation was down on 6/27 when he saw Dr. Clemens so there was no documention that it was reviewed. He states that indeed it was reviewed.

## 2017-07-13 ENCOUNTER — TRANSFERRED RECORDS (OUTPATIENT)
Dept: HEALTH INFORMATION MANAGEMENT | Facility: CLINIC | Age: 82
End: 2017-07-13

## 2017-07-21 ENCOUNTER — TRANSFERRED RECORDS (OUTPATIENT)
Dept: HEALTH INFORMATION MANAGEMENT | Facility: CLINIC | Age: 82
End: 2017-07-21

## 2017-08-01 NOTE — PROGRESS NOTES
[place] [id number]  Provider:  Dick Clemens  Patient:  Issac Zelaya  MRN:  [id number]  :  1935  Work type:  Cardiology Consult    Cc:  James Santana    DATE OF VISIT:  2017      Dear [place]:    Karthik Zelaya came today to review his longstanding relationship with chronic atrial fibrillation and hypertension, carotid artery disease with previous stenting back in [place] at [facility] in .  The patient had a horrible fall this past January.  He was admitted to [facility] with severe scalp injury and a component of subdural hematoma.  He did not need surgical intervention but it was touch-and-go for awhile. He subsequently recovered with what he thinks is only minimal post-concussion syndrome issues.      At the time, he was on Plavix for his carotid stenosis - stenting intervention, and Xarelto 15 mg a day because of his chronic atrial fibrillation.  Subsequently he was placed back on both of these anticoagulants for the indicated reasons, and happily has not had any complications.      He denies chest pain, palpitations, dizziness or syncope.  His head trauma was from slipping on the ice.  Fortunately, nothing like that has happened subsequently.      Review of systems is listed in Epic.  I endorsed the findings including chronic shortness of breath on exertion and sleep apnea, although both are stable.  He has had chronic lower leg edema, but somewhat improved recently.  Upper and lower GI negative,  negative, musculoskeletal positive mild achiness of the joints, some tingling of the feet, and otherwise negative.      His current medications are Plavix 75 mg a day, Xarelto 15 mg a day, lisinopril 5 mg a day, Tenormin 50 mg daily, atorvastatin 20 mg at bedtime, and omega-3 fatty acids, B12 and vitamins.      His physical exam shows a moderately overweight male at 273 pounds, blood pressure 116/70, heart rate 70 beats per minute, and mildly irregular.  Head is normal.  He had no  neck vein distention or bruits.  Heart was irregular without gallop or murmur.  Lungs clear.  Abdomen soft, pendulous, nontender without pain or mass.  Extremities showed bilateral trace edema.  Neurologically, he is intact.      I discussed at length the indications for Xarelto with regard to his atrial fibrillation and with his chronic carotid disease and stenting.  One could certainly make an argument that he does not need Plavix in the long run.  I encouraged him to think about a neurology assessment, it has been many years since that assessment, but he has been comfortable on the Plavix and Xarelto, and would not have had problems had he not fallen.      He is taking Xarelto for his atrial fibrillation which is nicely controlled.  He is normotensive.  He looks well and feels well.      We agreed that for the time being, he would stay on the two anticoagulants, but should he have any further bleeding issues, one would at least consider stopping the Plavix and, if need be, the Xarelto.     IMPRESSION:   1.  Post head trauma, resolved subdural hematoma.   2.  Indication for Xarelto is chronic atrial fibrillation.  Indication for Plavix presumably is remote coronary disease and carotid stenting.  One could certainly justify stopping Plavix should there be ongoing concern.   3.  No obvious signs of angina or heart failure.      PLAN:  I will see him in a year.  I have reviewed his care to you.  His LDL is at goal at 50.  His renal function is normal.  Hemoglobin A1c 5.6.  Minus the fall, he was doing pretty well.  If you have any questions, concerns or disagreements, give me a call.     Over half an hour was spent reviewing multiple records, imaging, EKGs, monitors, medications, medication side effects, complications and multiple blood work assessments.

## 2017-10-04 ENCOUNTER — THERAPY VISIT (OUTPATIENT)
Dept: PHYSICAL THERAPY | Facility: CLINIC | Age: 82
End: 2017-10-04
Payer: COMMERCIAL

## 2017-10-04 DIAGNOSIS — M79.671 PAIN IN BOTH FEET: Primary | ICD-10-CM

## 2017-10-04 DIAGNOSIS — M79.672 PAIN IN BOTH FEET: Primary | ICD-10-CM

## 2017-10-04 PROCEDURE — 97110 THERAPEUTIC EXERCISES: CPT | Mod: GP | Performed by: PHYSICAL THERAPIST

## 2017-10-04 PROCEDURE — 97161 PT EVAL LOW COMPLEX 20 MIN: CPT | Mod: GP | Performed by: PHYSICAL THERAPIST

## 2017-10-04 NOTE — LETTER
"[facility] PHYSICAL THERAPY  [address] [id number] [phone]    2017    Re: Issac Zelaya   :   1935  MRN:  [id number]   REFERRING PHYSICIAN:   Александр Munoz    [facility] PHYSICAL [facility]  Date of Initial Evaluation:  10/4/2017  Visits:  Rxs Used: 1  Reason for Referral:  Pain in both feet    EVALUATION SUMMARY    Subjective:  Issac Zelaya is a 82 year old male with a bilateral feet condition.  Condition occurred with:  Insidious onset.  Condition occurred: for unknown reasons.  This is a recurrent and chronic condition  Patient reports having problems with B feet since .  Symptoms have been worsening since 2017 for unknown reasons.  He had a lumbar injection in 2017 which had no effect on his B foot pain.  He reports a long history of LBP and has had MRIs and CT scan which showed his LB \"was a mess\".    Site of Pain: B metatarsal heads and toes.  Radiates to:  Lower leg (B).  Pain is described as aching and sharp and is constant and reported as 1/10.  Associated symptoms:  Other (cramping). Pain is worse during the night (with lying down in bed).  Exacerbated by: standing 15-20 minutes, walking 2-3 blocks. and relieved by nothing.  Since onset symptoms are gradually worsening.  Special tests:  MRI and CT scan (lumbar--\"a mess\" per patient report).  Previous treatment: none.    General health as reported by patient is good.  Pertinent medical history includes:  Stroke, high blood pressure, sleep disorder/apnea and heart problems.  Medical allergies: epinephrine.    Medication history: blood thinners, lipitor, lisinopril.  Current occupation is Retired.      Barriers include:  None as reported by patient.  Red flags:  None as reported by patient.    Objective:  Gait:  Decreased starr, decreased step-lengths, decreased weight transfer to forefoot B and decreased " pushoff  Gait Type:  Antalgic     Ankle/Foot Evaluation  ROM:    AROM:    Dorsiflexion:  Left:   9  Right:   8  Plantarflexion:  Left:  WNL    Right:  WNL  Inversion:  Left:  WNL     Right:  WNL  Eversion:  WNL     Right:  WNL  PROM:    Dorsiflexion:  Left:    10--pain with overpressure     Right:   10--pain with overpressure   Plantarflexion:  Left:    WNL--pain dorsum of foot with overpressure     Right:  WNL--pain dorsum of foot with overpressure  Strength wnl ankle: MMT normal and painfree B feet/ankles.  ROS  Assessment/Plan:    Patient is a 82 year old male with B foot complaints.  He has a long history of B foot problems and etiology is unclear at this time.  He has pain in the forefoot and toes along with tenderness at the arches and heels.  No pain with great toe extension stretching B.  MD was suspicious of lumbar involvement, but lumbar injection had no effect, and tenderness to palpation in various places throughout the feet is inconsistent with a lumbar problem.  He has decreased mobility and coordination in B feet, so treatment will focus on these areas to improve overall function.    Patient has the following significant findings with corresponding treatment plan.                Diagnosis 1:  B foot pain  Pain -  self management, education and home program  Decreased ROM/flexibility - therapeutic exercise and home program  Decreased strength - therapeutic exercise, therapeutic activities and home program  Decreased proprioception - neuro re-education, therapeutic activities and home program  Impaired gait - gait training and home program  Decreased function - therapeutic activities and home program    Therapy Evaluation Codes:   1) History comprised of:   Personal factors that impact the plan of care:      Time since onset of symptoms.    Comorbidity factors that impact the plan of care are:      None.     Medications impacting care: None.  2) Examination of Body Systems comprised of:   Body  structures and functions that impact the plan of care:      feet.   Activity limitations that impact the plan of care are:      Standing, Walking and Laying down.  3) Clinical presentation characteristics are:   Stable/Uncomplicated.  4) Decision-Making    Low complexity using standardized patient assessment instrument and/or   measureable assessment of functional outcome.  Cumulative Therapy Evaluation is: Low complexity.    Previous and current functional limitations:  (See Goal Flow Sheet for this information)    Short term and Long term goals: (See Goal Flow Sheet for this information)     Communication ability:  Patient appears to be able to clearly communicate and understand verbal and written communication and follow directions correctly.  Treatment Explanation - The following has been discussed with the patient:   RX ordered/plan of care  Anticipated outcomes  Possible risks and side effects  This patient would benefit from PT intervention to resume normal activities.   Rehab potential is good.    Frequency:  1 X week, once daily  Duration:  for 4 weeks tapering to 2 X a month over 1 month  Discharge Plan:  Achieve all LTG.  Independent in home treatment program.  Reach maximal therapeutic benefit.    Thank you for your referral.    INQUIRIES  Therapist: Helena Min, PT  [facility] PHYSICAL THERAPY  [address] [place] [id number]  Phone: [phone]  Fax: [phone]

## 2017-10-04 NOTE — PROGRESS NOTES
"Subjective:    Patient is a 82 year old male presenting with rehab right ankle/foot hpi. The history is provided by the patient.   Issac Zelaya is a 82 year old male with a bilateral feet condition.  Condition occurred with:  Insidious onset.  Condition occurred: for unknown reasons.  This is a recurrent and chronic condition  Patient reports having problems with B feet since 2015.  Symptoms have been worsening since 07/28/2017 for unknown reasons.  He had a lumbar injection in 08/03/2017 which had no effect on his B foot pain.  He reports a long history of LBP and has had MRIs and CT scan which showed his LB \"was a mess\".    Site of Pain: B metatarsal heads and toes.  Radiates to:  Lower leg (B).  Pain is described as aching and sharp and is constant and reported as 1/10.  Associated symptoms:  Other (cramping). Pain is worse during the night (with lying down in bed).  Exacerbated by: standing 15-20 minutes, walking 2-3 blocks. and relieved by nothing.  Since onset symptoms are gradually worsening.  Special tests:  MRI and CT scan (lumbar--\"a mess\" per patient report).  Previous treatment: none.    General health as reported by patient is good.  Pertinent medical history includes:  Stroke, high blood pressure, sleep disorder/apnea and heart problems.  Medical allergies: epinephrine.    Medication history: blood thinners, lipitor, lisinopril.  Current occupation is Retired.        Barriers include:  None as reported by patient.    Red flags:  None as reported by patient.                        Objective:      Gait:  Decreased starr, decreased step-lengths, decreased weight transfer to forefoot B and decreased pushoff  Gait Type:  Antalgic               Ankle/Foot Evaluation  ROM:    AROM:    Dorsiflexion:  Left:   9  Right:   8  Plantarflexion:  Left:  WNL    Right:  WNL  Inversion:  Left:  WNL     Right:  WNL  Eversion:  WNL     Right:  WNL      PROM:    Dorsiflexion:  Left:    10--pain with overpressure     " Right:   10--pain with overpressure   Plantarflexion:  Left:    WNL--pain dorsum of foot with overpressure     Right:  WNL--pain dorsum of foot with overpressure              Strength wnl ankle: MMT normal and painfree B feet/ankles.            FUNCTIONAL TESTS:           Proprioception:  Stork Balance Test: Left: unable  Right: unable                                                     General     ROS    Assessment/Plan:      Patient is a 82 year old male with B foot complaints.  He has a long history of B foot problems and etiology is unclear at this time.  He has pain in the forefoot and toes along with tenderness at the arches and heels.  No pain with great toe extension stretching B.  MD was suspicious of lumbar involvement, but lumbar injection had no effect, and tenderness to palpation in various places throughout the feet is inconsistent with a lumbar problem.  He has decreased mobility and coordination in B feet, so treatment will focus on these areas to improve overall function.    Patient has the following significant findings with corresponding treatment plan.                Diagnosis 1:  B foot pain  Pain -  self management, education and home program  Decreased ROM/flexibility - therapeutic exercise and home program  Decreased strength - therapeutic exercise, therapeutic activities and home program  Decreased proprioception - neuro re-education, therapeutic activities and home program  Impaired gait - gait training and home program  Decreased function - therapeutic activities and home program    Therapy Evaluation Codes:   1) History comprised of:   Personal factors that impact the plan of care:      Time since onset of symptoms.    Comorbidity factors that impact the plan of care are:      None.     Medications impacting care: None.  2) Examination of Body Systems comprised of:   Body structures and functions that impact the plan of care:      feet.   Activity limitations that impact the plan of care  are:      Standing, Walking and Laying down.  3) Clinical presentation characteristics are:   Stable/Uncomplicated.  4) Decision-Making    Low complexity using standardized patient assessment instrument and/or measureable assessment of functional outcome.  Cumulative Therapy Evaluation is: Low complexity.    Previous and current functional limitations:  (See Goal Flow Sheet for this information)    Short term and Long term goals: (See Goal Flow Sheet for this information)     Communication ability:  Patient appears to be able to clearly communicate and understand verbal and written communication and follow directions correctly.  Treatment Explanation - The following has been discussed with the patient:   RX ordered/plan of care  Anticipated outcomes  Possible risks and side effects  This patient would benefit from PT intervention to resume normal activities.   Rehab potential is good.    Frequency:  1 X week, once daily  Duration:  for 4 weeks tapering to 2 X a month over 1 month  Discharge Plan:  Achieve all LTG.  Independent in home treatment program.  Reach maximal therapeutic benefit.    Please refer to the daily flowsheet for treatment today, total treatment time and time spent performing 1:1 timed codes.

## 2017-10-04 NOTE — MR AVS SNAPSHOT
After Visit Summary   10/4/2017    sIsac Zelaya    MRN: [id number]           Patient Information     Date Of Birth          1935        Visit Information        Provider Department      10/4/2017 10:50 AM Helena Min PT [facility]        Today's Diagnoses     Pain in both feet    -  1       Follow-ups after your visit        Your next 10 appointments already scheduled     Oct 09, 2017 11:00 AM CDT   YUNIER Extremity with Helena Min PT   [facility] ([facility]  )    [address] [address]   [phone]            Oct 12, 2017 11:00 AM CDT   Nurse Only with CR FLU CLINIC   [facility]    [address] [place] [id number] [phone]            Oct 17, 2017 11:00 AM CDT   YUNIER Extremity with Helena Min PT   [facility] ([facility]  )    [address]  [facility] [address] [phone]              Who to contact     If you have questions or need follow up information about today's clinic visit or your schedule please contact [facility] PHYSICAL THERAPY directly at [phone].  Normal or non-critical lab and imaging results will be communicated to you by MyChart, letter or phone within 4 business days after the clinic has received the results. If you do not hear from us within 7 days, please contact the clinic through MyChart or phone. If you have a critical or abnormal lab result, we will notify you by phone as soon as possible.  Submit refill requests through [employer] or call your pharmacy and they will forward the refill request to us. Please allow 3 business days for your refill to be completed.          Additional Information About Your Visit        MyChart Information      Taptu gives you secure access to your electronic health record. If you see a primary care provider, you can also send messages to your care team and make appointments. If you have questions, please call your primary care clinic.  If you do not have a primary care provider, please call [phone] and they will assist you.        Care EveryWhere ID     This is your Care EveryWhere ID. This could be used by other organizations to access your [facility] medical records  [id number]         Blood Pressure from Last 3 Encounters:   06/27/17 116/70   06/20/17 138/88   01/24/17 133/73    Weight from Last 3 Encounters:   06/27/17 123.9 kg (273 lb 3.2 oz)   06/20/17 123.5 kg (272 lb 3.2 oz)   01/24/17 119.9 kg (264 lb 6.4 oz)              We Performed the Following     YUNIER Inital Eval Report     PT Eval, Low Complexity (47430)     Therapeutic Exercises        Primary Care Provider Office Phone # Fax #    James Acosta Fajardo [phone] [address] [facility] [address]        Equal Access to Services     [facility]: Hadii aad ku hadasho Soomaali, waaxda luqadaha, qaybta kaalmada adejewelsyada, geraldine morin . So [facility] [phone].    ATENCIÓN: Si habla español, tiene a spear disposición servicios gratuitos de asistencia lingüística. [facility] [phone].    We comply with applicable federal civil rights laws and [place] laws. We do not discriminate on the basis of race, color, national origin, age, disability, sex, sexual orientation, or gender identity.            Thank you!     Thank you for choosing [facility]  for your care. Our goal is always to provide you with excellent care. Hearing back from our patients is one way we can continue to improve our services. Please take a few minutes to complete the written survey that you may receive in the mail after your visit with us. Thank you!             Your Updated  Medication List - Protect others around you: Learn how to safely use, store and throw away your medicines at www.disposemymeds.org.          This list is accurate as of: 10/4/17  3:51 PM.  Always use your most recent med list.                   Brand Name Dispense Instructions for use Diagnosis    atenolol 50 MG tablet    TENORMIN    135 tablet    1 tab in morning.    Essential hypertension with goal blood pressure less than 140/90, DOMINGO (obstructive sleep apnea), Essential hypertension, benign, Chronic atrial fibrillation (H)       atorvastatin 20 MG tablet    LIPITOR    90 tablet    Take 1 tablet (20 mg) by mouth daily    Hyperlipidemia LDL goal <100, Stenosis of left carotid artery       B-12 PO      1000 mg daily        CENTRUM PO      None Entered        clopidogrel 75 MG tablet    PLAVIX    90 tablet    Take 1 tablet (75 mg) by mouth daily    DOMINGO (obstructive sleep apnea), Essential hypertension, benign, Hypertension goal BP (blood pressure) < 140/90, Chronic atrial fibrillation (H)       DULoxetine 60 MG EC capsule    CYMBALTA     Take by mouth daily        furosemide 20 MG tablet    LASIX    90 tablet    Take 1 tablet (20 mg) by mouth daily    DOMINGO (obstructive sleep apnea)       lisinopril 5 MG tablet    PRINIVIL/ZESTRIL    90 tablet    Take 1 tablet (5 mg) by mouth daily    Essential hypertension, benign, DOMINGO (obstructive sleep apnea), Hypertension goal BP (blood pressure) < 140/90, Chronic atrial fibrillation (H)       omega 3 1000 MG Caps      Take 2 g by mouth        rivaroxaban ANTICOAGULANT 15 MG Tabs tablet    XARELTO    90 tablet    Take 1 tablet (15 mg) by mouth daily (with dinner)    Chronic atrial fibrillation (H), Essential hypertension, benign, Hypertension goal BP (blood pressure) < 140/90, DOMINGO (obstructive sleep apnea)

## 2017-10-09 ENCOUNTER — THERAPY VISIT (OUTPATIENT)
Dept: PHYSICAL THERAPY | Facility: CLINIC | Age: 82
End: 2017-10-09
Payer: COMMERCIAL

## 2017-10-09 DIAGNOSIS — M79.672 PAIN IN BOTH FEET: ICD-10-CM

## 2017-10-09 DIAGNOSIS — M79.671 PAIN IN BOTH FEET: ICD-10-CM

## 2017-10-09 PROCEDURE — 97110 THERAPEUTIC EXERCISES: CPT | Mod: GP | Performed by: PHYSICAL THERAPIST

## 2017-10-09 PROCEDURE — 97112 NEUROMUSCULAR REEDUCATION: CPT | Mod: GP | Performed by: PHYSICAL THERAPIST

## 2017-10-17 ENCOUNTER — THERAPY VISIT (OUTPATIENT)
Dept: PHYSICAL THERAPY | Facility: CLINIC | Age: 82
End: 2017-10-17
Payer: COMMERCIAL

## 2017-10-17 DIAGNOSIS — M79.671 PAIN IN BOTH FEET: ICD-10-CM

## 2017-10-17 DIAGNOSIS — M79.672 PAIN IN BOTH FEET: ICD-10-CM

## 2017-10-17 PROCEDURE — 97112 NEUROMUSCULAR REEDUCATION: CPT | Mod: GP | Performed by: PHYSICAL THERAPIST

## 2017-10-17 PROCEDURE — 97110 THERAPEUTIC EXERCISES: CPT | Mod: GP | Performed by: PHYSICAL THERAPIST

## 2017-10-25 ENCOUNTER — THERAPY VISIT (OUTPATIENT)
Dept: PHYSICAL THERAPY | Facility: CLINIC | Age: 82
End: 2017-10-25
Payer: COMMERCIAL

## 2017-10-25 DIAGNOSIS — M79.671 PAIN IN BOTH FEET: ICD-10-CM

## 2017-10-25 DIAGNOSIS — M79.672 PAIN IN BOTH FEET: ICD-10-CM

## 2017-10-25 PROCEDURE — 97530 THERAPEUTIC ACTIVITIES: CPT | Mod: GP | Performed by: PHYSICAL THERAPIST

## 2017-10-25 PROCEDURE — 97110 THERAPEUTIC EXERCISES: CPT | Mod: GP | Performed by: PHYSICAL THERAPIST

## 2017-10-25 PROCEDURE — 97112 NEUROMUSCULAR REEDUCATION: CPT | Mod: GP | Performed by: PHYSICAL THERAPIST

## 2017-11-15 ENCOUNTER — THERAPY VISIT (OUTPATIENT)
Dept: PHYSICAL THERAPY | Facility: CLINIC | Age: 82
End: 2017-11-15
Payer: COMMERCIAL

## 2017-11-15 DIAGNOSIS — M79.671 PAIN IN BOTH FEET: ICD-10-CM

## 2017-11-15 DIAGNOSIS — M79.672 PAIN IN BOTH FEET: ICD-10-CM

## 2017-11-15 PROCEDURE — 97112 NEUROMUSCULAR REEDUCATION: CPT | Mod: GP | Performed by: PHYSICAL THERAPIST

## 2017-11-15 PROCEDURE — 97110 THERAPEUTIC EXERCISES: CPT | Mod: GP | Performed by: PHYSICAL THERAPIST

## 2017-11-15 NOTE — MR AVS SNAPSHOT
After Visit Summary   11/15/2017    Issac Zelaya    MRN: [id number]           Patient Information     Date Of Birth          1935        Visit Information        Provider Department      11/15/2017 12:10 PM Helena Min PT [facility]        Today's Diagnoses     Pain in both feet           Follow-ups after your visit        Who to contact     If you have questions or need follow up information about today's clinic visit or your schedule please contact [facility] PH[facility] THERAP[facility] directly at [phone].  Normal or non-critical lab and imaging results will be communicated to you by tinyclueshart, letter or phone within 4 business days after the clinic has received the results. If you do not hear from us within 7 days, please contact the clinic through Gate[employer]ur[employer] or phone. If you have a critical or abnormal lab result, we will notify you by phone as soon as possible.  Submit refill requests through Taggled or call your pharmacy and they will forward the refill request to us. Please allow 3 business days for your refill to be completed.          Additional Information About Your Visit        MyChart Information     Taggled gives you secure access to your electronic health record. If you see a primary care provider, you can also send messages to your care team and make appointments. If you have questions, please call your primary care clinic.  If you do not have a primary care provider, please call [phone] and they will assist you.        Care EveryWhere ID     This is your Care EveryWhere ID. This could be used by other organizations to access your [place] medical records  [id number]         Blood Pressure from Last 3 Encounters:   06/27/17 116/70   06/20/17 138/88   01/24/17 133/73    Weight from Last 3 Encounters:   06/27/17 123.9 kg (273 lb 3.2 oz)   06/20/17 123.5 kg (272 lb 3.2 oz)   01/24/17 119.9 kg (264  lb 6.4 oz)              We Performed the Following     YUNIER Progress Notes Report     Neuromuscular Re-Education     Therapeutic Exercises        Primary Care Provider Office Phone # Fax #    James Fajardo [phone] [address]  [facility] [address]        Equal Access to Services     [facility]OST[facility] : Hadii aad ku hadasho Soomaali, waaxda luqadaha, qaybta kaalmada adeegyada, waxay idiin hayaan adeeg ricarda la'horacion ah. So [facility] [phone].    ATENCIÓN: Si habla español, tiene a spear disposición servicios gratuitos de asistencia lingüística. Carlos al [phone].    We comply with applicable federal civil rights laws and [place] laws. We do not discriminate on the basis of race, color, national origin, age, disability, sex, sexual orientation, or gender identity.            Thank you!     Thank you for choosing [place] [facility] THER[facility]  for your care. Our goal is always to provide you with excellent care. Hearing back from our patients is one way we can continue to improve our services. Please take a few minutes to complete the written survey that you may receive in the mail after your visit with us. Thank you!             Your Updated Medication List - Protect others around you: Learn how to safely use, store and throw away your medicines at www.disposemymeds.org.          This list is accurate as of: 11/15/17  7:45 PM.  Always use your most recent med list.                   Brand Name Dispense Instructions for use Diagnosis    atenolol 50 MG tablet    TENORMIN    135 tablet    1 tab in morning.    Essential hypertension with goal blood pressure less than 140/90, DOMINGO (obstructive sleep apnea), Essential hypertension, benign, Chronic atrial fibrillation (H)       atorvastatin 20 MG tablet    LIPITOR    90 tablet    Take 1 tablet (20 mg) by mouth daily    Hyperlipidemia LDL goal <100, Stenosis of left carotid artery       B-12 PO      1000 mg  daily        CENTRUM PO      None Entered        clopidogrel 75 MG tablet    PLAVIX    90 tablet    Take 1 tablet (75 mg) by mouth daily    DOMINGO (obstructive sleep apnea), Essential hypertension, benign, Hypertension goal BP (blood pressure) < 140/90, Chronic atrial fibrillation (H)       DULoxetine 60 MG EC capsule    CYMBALTA     Take by mouth daily        furosemide 20 MG tablet    LASIX    90 tablet    Take 1 tablet (20 mg) by mouth daily    DOMINGO (obstructive sleep apnea)       lisinopril 5 MG tablet    PRINIVIL/ZESTRIL    90 tablet    Take 1 tablet (5 mg) by mouth daily    Essential hypertension, benign, DOMINGO (obstructive sleep apnea), Hypertension goal BP (blood pressure) < 140/90, Chronic atrial fibrillation (H)       omega 3 1000 MG Caps      Take 2 g by mouth        rivaroxaban ANTICOAGULANT 15 MG Tabs tablet    XARELTO    90 tablet    Take 1 tablet (15 mg) by mouth daily (with dinner)    Chronic atrial fibrillation (H), Essential hypertension, benign, Hypertension goal BP (blood pressure) < 140/90, DOMINGO (obstructive sleep apnea)

## 2017-11-15 NOTE — LETTER
"[place] [facility]  [address] [id number] [phone]    2017    Re: Issac Zelaya   :   1935  MRN:  [id number]   REFERRING PHYSICIAN:   Александр Munoz    [facility] PHYSICAL THERAPY    Date of Initial Evaluation:  10/4/2017  Visits:  Rxs Used: 5  Reason for Referral:  Pain in both feet    DISCHARGE REPORT  Progress reporting period is from 10/04/2017 to 11/15/2017.       SUBJECTIVE  Subjective: Patient reports no improvement with his feet since his initial visit.  He continues to have B foot pain with getting in bed at night--B feet start aching.  Feet feels very stiff in the mornings, and getting out of bed is difficult.  He has the most pain in the plantar surface of the forefoot near the toes.  Walking tolerance is limited to about 4 blocks due to shortness of breath, general deconditioning, and B foot pain.      Current Pain level:  (1-7/10).     Initial Pain level:  (1-7/10).   Changes in function:  None.  Adverse reaction to treatment or activity: None    OBJECTIVE  Objective: Tandem stance, floor, EO 60\" best trial.  Continued decreased strength and endurance throughout LEs.  Decreased mobility in general.  Lumbar AROM:  extension moderate to max limitation.       ASSESSMENT/PLAN  Patient has been seen for 5 visits with treatment focusing on ROM, strengthening, and proprioception exercises for the feet and LEs in general.  He has not made progress with current treatment.  No subjective or objective changes have been noted.  He has continued B foot pain that limits his mobility, however, mobility is also limited by LBP and general deconditioning.  He needs to continue with a consistent HEP to improve upon this.  B foot symptoms have not changed and etiology is unclear at this time.  No further PT is recommended at this time due to no response to current treatment.    Updated " problem list and treatment plan: Diagnosis 1:  B foot pain  Pain -  self management, education, directional preference exercise and home program  Decreased ROM/flexibility - home program  Decreased strength - home program  Impaired balance - home program  Decreased proprioception - home program  Decreased function - home program  Impaired posture - home program  STG/LTGs have been met or progress has been made towards goals:  None  Assessment of Progress: The patient's condition is unchanged.  Self Management Plans:  Patient has been instructed in a home treatment program.  Patient  has been instructed in self management of symptoms.  Issac continues to require the following intervention to meet STG and LTG's:  PT intervention is no longer required to meet STG/LTG.    Recommendations:  This patient is ready to be discharged from therapy and continue their home treatment program.  Patient would benefit from additional evaluation as he is not responding to PT at this time.      Thank you for your referral.    INQUIRIES  Therapist: Helena Min, PT   [facility]  [address] [place] [address]  Phone: [phone]  Fax: [phone]

## 2017-11-16 NOTE — PROGRESS NOTES
"Subjective:    HPI                    Objective:    System    Physical Exam    General     ROS    Assessment/Plan:      DISCHARGE REPORT    Progress reporting period is from 10/04/2017 to 11/15/2017.       SUBJECTIVE  Subjective: Patient reports no improvement with his feet since his initial visit.  He continues to have B foot pain with getting in bed at night--B feet start aching.  Feet feels very stiff in the mornings, and getting out of bed is difficult.  He has the most pain in the plantar surface of the forefoot near the toes.  Walking tolerance is limited to about 4 blocks due to shortness of breath, general deconditioning, and B foot pain.      Current Pain level:  (1-7/10).     Initial Pain level:  (1-7/10).   Changes in function:  None.  Adverse reaction to treatment or activity: None    OBJECTIVE  Objective: Tandem stance, floor, EO 60\" best trial.  Continued decreased strength and endurance throughout LEs.  Decreased mobility in general.  Lumbar AROM:  extension moderate to max limitation.       ASSESSMENT/PLAN  Patient has been seen for 5 visits with treatment focusing on ROM, strengthening, and proprioception exercises for the feet and LEs in general.  He has not made progress with current treatment.  No subjective or objective changes have been noted.  He has continued B foot pain that limits his mobility, however, mobility is also limited by LBP and general deconditioning.  He needs to continue with a consistent HEP to improve upon this.  B foot symptoms have not changed and etiology is unclear at this time.  No further PT is recommended at this time due to no response to current treatment.    Updated problem list and treatment plan: Diagnosis 1:  B foot pain  Pain -  self management, education, directional preference exercise and home program  Decreased ROM/flexibility - home program  Decreased strength - home program  Impaired balance - home program  Decreased proprioception - home " program  Decreased function - home program  Impaired posture - home program  STG/LTGs have been met or progress has been made towards goals:  None  Assessment of Progress: The patient's condition is unchanged.  Self Management Plans:  Patient has been instructed in a home treatment program.  Patient  has been instructed in self management of symptoms.  Issac continues to require the following intervention to meet STG and LTG's:  PT intervention is no longer required to meet STG/LTG.    Recommendations:  This patient is ready to be discharged from therapy and continue their home treatment program.  Patient would benefit from additional evaluation as he is not responding to PT at this time.      Please refer to the daily flowsheet for treatment today, total treatment time and time spent performing 1:1 timed codes.

## 2018-08-02 ENCOUNTER — TELEPHONE (OUTPATIENT)
Dept: CARDIOLOGY | Facility: CLINIC | Age: 83
End: 2018-08-02

## 2018-08-02 DIAGNOSIS — I65.22 STENOSIS OF LEFT CAROTID ARTERY: ICD-10-CM

## 2018-08-02 DIAGNOSIS — G47.33 OSA (OBSTRUCTIVE SLEEP APNEA): ICD-10-CM

## 2018-08-02 DIAGNOSIS — I10 HYPERTENSION GOAL BP (BLOOD PRESSURE) < 140/90: ICD-10-CM

## 2018-08-02 DIAGNOSIS — I10 ESSENTIAL HYPERTENSION, BENIGN: ICD-10-CM

## 2018-08-02 DIAGNOSIS — E78.5 HYPERLIPIDEMIA LDL GOAL <100: ICD-10-CM

## 2018-08-02 DIAGNOSIS — I10 ESSENTIAL HYPERTENSION WITH GOAL BLOOD PRESSURE LESS THAN 140/90: ICD-10-CM

## 2018-08-02 DIAGNOSIS — I48.20 CHRONIC ATRIAL FIBRILLATION (H): ICD-10-CM

## 2018-08-02 RX ORDER — FUROSEMIDE 20 MG
20 TABLET ORAL DAILY
Qty: 90 TABLET | Refills: 3 | Status: SHIPPED | OUTPATIENT
Start: 2018-08-02 | End: 2019-08-22

## 2018-08-02 RX ORDER — ATENOLOL 50 MG/1
TABLET ORAL
Qty: 135 TABLET | Refills: 3 | Status: SHIPPED | OUTPATIENT
Start: 2018-08-02 | End: 2019-08-22

## 2018-08-02 RX ORDER — LISINOPRIL 5 MG/1
5 TABLET ORAL DAILY
Qty: 90 TABLET | Refills: 3 | Status: SHIPPED | OUTPATIENT
Start: 2018-08-02 | End: 2019-07-29

## 2018-08-02 RX ORDER — CLOPIDOGREL BISULFATE 75 MG/1
75 TABLET ORAL DAILY
Qty: 90 TABLET | Refills: 3 | Status: SHIPPED | OUTPATIENT
Start: 2018-08-02 | End: 2018-08-13

## 2018-08-02 RX ORDER — ATORVASTATIN CALCIUM 20 MG/1
20 TABLET, FILM COATED ORAL DAILY
Qty: 90 TABLET | Refills: 3 | Status: SHIPPED | OUTPATIENT
Start: 2018-08-02 | End: 2019-07-29

## 2018-08-02 NOTE — TELEPHONE ENCOUNTER
Needing cardiac medications refilled before his upcoming OV with Dr. Clemens on 8/13/18.   [facility] of Patient.

## 2018-08-13 ENCOUNTER — OFFICE VISIT (OUTPATIENT)
Dept: CARDIOLOGY | Facility: CLINIC | Age: 83
End: 2018-08-13
Attending: INTERNAL MEDICINE
Payer: COMMERCIAL

## 2018-08-13 VITALS
DIASTOLIC BLOOD PRESSURE: 76 MMHG | WEIGHT: 271.2 LBS | BODY MASS INDEX: 36.73 KG/M2 | HEART RATE: 78 BPM | HEIGHT: 72 IN | SYSTOLIC BLOOD PRESSURE: 118 MMHG

## 2018-08-13 DIAGNOSIS — I10 ESSENTIAL HYPERTENSION, BENIGN: ICD-10-CM

## 2018-08-13 DIAGNOSIS — I71.20 THORACIC AORTIC ANEURYSM WITHOUT RUPTURE (H): ICD-10-CM

## 2018-08-13 DIAGNOSIS — I65.22 STENOSIS OF LEFT CAROTID ARTERY: ICD-10-CM

## 2018-08-13 DIAGNOSIS — S06.5XAA SUBDURAL HEMATOMA (H): ICD-10-CM

## 2018-08-13 DIAGNOSIS — E66.01 MORBID OBESITY DUE TO EXCESS CALORIES (H): ICD-10-CM

## 2018-08-13 DIAGNOSIS — I48.20 CHRONIC ATRIAL FIBRILLATION (H): ICD-10-CM

## 2018-08-13 DIAGNOSIS — I10 ESSENTIAL HYPERTENSION WITH GOAL BLOOD PRESSURE LESS THAN 140/90: ICD-10-CM

## 2018-08-13 DIAGNOSIS — G47.33 OSA (OBSTRUCTIVE SLEEP APNEA): ICD-10-CM

## 2018-08-13 DIAGNOSIS — I10 HYPERTENSION GOAL BP (BLOOD PRESSURE) < 140/90: ICD-10-CM

## 2018-08-13 DIAGNOSIS — E78.5 HYPERLIPIDEMIA LDL GOAL <100: ICD-10-CM

## 2018-08-13 PROCEDURE — 99214 OFFICE O/P EST MOD 30 MIN: CPT | Performed by: INTERNAL MEDICINE

## 2018-08-13 RX ORDER — CLOPIDOGREL BISULFATE 75 MG/1
75 TABLET ORAL DAILY
Qty: 90 TABLET | Refills: 3
Start: 2018-08-13 | End: 2019-07-29

## 2018-08-13 NOTE — MR AVS SNAPSHOT
After Visit Summary   8/13/2018    Issac Zelaya    MRN: [id number]           Patient Information     Date Of Birth          1935        Visit Information        Provider Department      8/13/2018 3:15 PM Dick Clemens MD [facility]        Today's Diagnoses     Hypertension goal BP (blood pressure) < 140/90        Morbid obesity due to excess calories (H)        Thoracic aortic aneurysm without rupture (H)        Stenosis of left carotid artery        Post fall => head tauma => SDH => [facility]        Hyperlipidemia LDL goal <100        DOMINGO (obstructive sleep apnea)        Essential hypertension, benign        Chronic atrial fibrillation (H)        Essential hypertension with goal blood pressure less than 140/90           Follow-ups after your visit        Additional Services     Follow-Up with Cardiologist                 Future tests that were ordered for you today     Open Future Orders        Priority Expected Expires Ordered    Follow-Up with Cardiologist Routine 8/13/2019 8/14/2019 8/13/2018            Who to contact     If you have questions or need follow up information about today's clinic visit or your schedule please contact [facility] directly at [phone].  Normal or non-critical lab and imaging results will be communicated to you by [employer] Mobile Majority[employer], letter or phone within 4 business days after the clinic has received the results. If you do not hear from us within 7 days, please contact the clinic through SNAPP't or phone. If you have a critical or abnormal lab result, we will notify you by phone as soon as possible.  Submit refill requests through Ariisto or call your pharmacy and they will forward the refill request to us. Please allow 3 business days for your refill to be completed.          Additional Information About Your Visit        [employer] Mobile [employer] Information     [employer] gives you secure  "access to your electronic health record. If you see a primary care provider, you can also send messages to your care team and make appointments. If you have questions, please call your primary care clinic.  If you do not have a primary care provider, please call [phone] and they will assist you.        Care EveryWhere ID     This is your Care EveryWhere ID. This could be used by other organizations to access your [facility] medical records  [id number]        Your Vitals Were     Pulse Height BMI (Body Mass Index)             78 1.816 m (5' 11.5\") 37.3 kg/m2          Blood Pressure from Last 3 Encounters:   08/13/18 118/76   06/27/17 116/70   06/20/17 138/88    Weight from Last 3 Encounters:   08/13/18 123 kg (271 lb 3.2 oz)   06/27/17 123.9 kg (273 lb 3.2 oz)   06/20/17 123.5 kg (272 lb 3.2 oz)              We Performed the Following     Follow-Up with Cardiologist          Where to get your medicines      These medications were sent to [facility] [address] [place] [address] [employer] AT [place] [address] & [facility]  [id number] [place] [address] [facility] [address]     Phone:  [phone]     rivaroxaban ANTICOAGULANT 15 MG Tabs tablet         Some of these will need a paper prescription and others can be bought over the counter.  Ask your nurse if you have questions.     You don't need a prescription for these medications     clopidogrel 75 MG tablet          Primary Care Provider Office Phone # Fax #    James Fajardo [phone] [address] [facility] [address]        Equal Access to Services     NONI ABEL : Hadjose Valverde, marc romo, geraldine bhat. So [facility] [phone].    ATENCIÓN: Si habla español, tiene a spear disposición servicios gratuitos de asistencia lingüística. Carlos al [phone].    We comply with applicable federal civil rights laws and [place] laws. We do not " discriminate on the basis of race, color, national origin, age, disability, sex, sexual orientation, or gender identity.            Thank you!     Thank you for choosing [facility]  for your care. Our goal is always to provide you with excellent care. Hearing back from our patients is one way we can continue to improve our services. Please take a few minutes to complete the written survey that you may receive in the mail after your visit with us. Thank you!             Your Updated Medication List - Protect others around you: Learn how to safely use, store and throw away your medicines at www.disposemymeds.org.          This list is accurate as of 8/13/18  4:07 PM.  Always use your most recent med list.                   Brand Name Dispense Instructions for use Diagnosis    atenolol 50 MG tablet    TENORMIN    135 tablet    1 tab in morning.    Essential hypertension with goal blood pressure less than 140/90, DOMINGO (obstructive sleep apnea), Essential hypertension, benign, Chronic atrial fibrillation (H)       atorvastatin 20 MG tablet    LIPITOR    90 tablet    Take 1 tablet (20 mg) by mouth daily    Hyperlipidemia LDL goal <100, Stenosis of left carotid artery       B-12 PO      1000 mg daily        CENTRUM PO      None Entered        clopidogrel 75 MG tablet    PLAVIX    90 tablet    Take 1 tablet (75 mg) by mouth daily    DOMINGO (obstructive sleep apnea), Essential hypertension, benign, Hypertension goal BP (blood pressure) < 140/90, Chronic atrial fibrillation (H)       DULoxetine 60 MG EC capsule    CYMBALTA     Take by mouth daily        furosemide 20 MG tablet    LASIX    90 tablet    Take 1 tablet (20 mg) by mouth daily    DOMINGO (obstructive sleep apnea)       lisinopril 5 MG tablet    PRINIVIL/ZESTRIL    90 tablet    Take 1 tablet (5 mg) by mouth daily    Essential hypertension, benign, DOMINGO (obstructive sleep apnea), Hypertension goal BP (blood pressure) < 140/90,  Chronic atrial fibrillation (H)       omega 3 1000 MG Caps      Take 2 g by mouth        rivaroxaban ANTICOAGULANT 15 MG Tabs tablet    XARELTO    90 tablet    Take 1 tablet (15 mg) by mouth daily (with dinner)    Chronic atrial fibrillation (H), Essential hypertension, benign, Hypertension goal BP (blood pressure) < 140/90, DOMINGO (obstructive sleep apnea)

## 2018-08-13 NOTE — LETTER
8/13/2018      James Fajardo MD  [address]  [facility] [address]      RE: Issac MARLOW Garcia       Dear Colleague,    I had the pleasure of seeing Issac Zelaya in the [facility].    Service Date: 08/13/2018      HISTORY OF PRESENT ILLNESS:  I got together with Karthik Zelaya today.  He is 83 and chronically overweight.  He has a history of hypertension.  He has had carotid artery disease, sleep apnea and chronic atrial fibrillation.      He came today asking if he had to stay on these medicines forever.  I think most specifically, he was talking about Xarelto and Plavix.  He is on the Xarelto because of his atrial fibrillation.  He is on Plavix because of a history of carotid disease and concerns for TIA/CVA.      In the past year, he has not had anything to suggest classic angina or obvious heart failure.  He does have some swelling of the lower legs that is a bit more marked than it has been in the past.      He says his level of activity is stable.  He does not feel that he is particularly limited in terms of acts of daily living.      He is short of breath if he really pushes himself, but he rarely does so.  He has lower leg edema.      REVIEW OF SYSTEMS:    UPPER AND LOWER GASTROINTESTINAL:  Negative.   GENITOURINARY:  Positive for frequency of urination.   MUSCULOSKELETAL:  Positive for some achiness of the backs and lower legs.   NEUROLOGIC:  Positive for numbness and tingling of the feet compatible with neuropathy.     The remaining review is negative.  He does have easy bruising, but it is no surprise in the context of being on Xarelto and Plavix.  He has had no obvious bleeding otherwise.        I spent over 20 minutes alone just reviewing the issues of taking anticoagulants, the way that they work, how they work, why they work and why we have recommended Plavix and Xarelto together.  He is on a smaller dose of Xarelto at 15 mg and a standard dose of Plavix at 75 mg.   I reviewed with him the fact that he is in a position he could stop these medicines should he feel it necessary or appropriate, and we would stop it should there be any obvious bleeding or hemorrhagic issues.      I explained the risk of stroke and the mechanisms with regards to atrial fibrillation.  I discussed plugged arteries and carotid artery disease that might cause stroke or TIA.      His blood pressure has been under nice control.  His lipid profiles have been wonderfully low with total cholesterol of 112, LDL 78, HDL 50, triglycerides 139.      PHYSICAL EXAMINATION:   VITAL SIGNS:  Showed a blood pressure 118/70, heart rate 80 beats a minute and mildly irregular.  He weighs 271 pounds, which for him is stable.   NECK:  He had no neck vein distention or bruit at 30 degrees.   HEART:  Irregular without gallop or murmur.   LUNGS:  Clear.   ABDOMEN:  Obese, firm, nontender without pain, mass or guarding.   EXTREMITIES:  Show +1 to +2 bilateral lower leg edema.  This seems more marked than in the past.      CURRENT MEDICATIONS:   1.  Tenormin 50 mg a day.   2.  Atorvastatin 20 mg at bedtime.   3.  Plavix 75 mg a day.   4.  Lasix 20 mg a day.   5.  Lisinopril 5 mg a day.   6.  Xarelto 15 mg a day.   7.  Cymbalta 60 mg a day.     He is also on Omega 3 fatty acids and multivitamins and vitamin B12.      With all that, he is actually feeling pretty comfortable with life.  He is annoyed with the lower leg peripheral neuropathy, swelling is a bit more marked.  At this juncture, I did not address it.  He is already on 20 mg of Lasix a day.      I did mention that weight loss would be helpful, but it is a bit of a pipe dream at age 83.  I discussed carbohydrate abstinence, but all in all he smiled and said he would try.      IMPRESSION:   1.  Controlled atrial fibrillation.   2.  Xarelto for anticoagulation for stroke prophylaxis.   3.  Plavix because of peripheral vascular and carotid artery disease.   4.  Overweight.    5.  Sleep apnea.   6.  Mild aortic root enlargement at 4.3 cm.      PLAN:  I will see him in a year.  We will get an echo at that time for surveillance.      I think his prognosis is good.  Should he have bleeding complications, it would demand a reassessment of his anticoagulation therapy.      cc:   [place] [address]         TEOFILO CASTILLO MD, [facility]             D: 2018   T: 2018   MT: FRAN      Name:     MARNI GONZALEZ   MRN:      [id number]        Account:      [id number]   :      1935           Service Date: 2018      Document: [id number]         Outpatient Encounter Prescriptions as of 2018   Medication Sig Dispense Refill     atenolol (TENORMIN) 50 MG tablet 1 tab in morning. 135 tablet 3     atorvastatin (LIPITOR) 20 MG tablet Take 1 tablet (20 mg) by mouth daily 90 tablet 3     B-12 OR 1000 mg daily       CENTRUM OR None Entered       clopidogrel (PLAVIX) 75 MG tablet Take 1 tablet (75 mg) by mouth daily 90 tablet 3     furosemide (LASIX) 20 MG tablet Take 1 tablet (20 mg) by mouth daily 90 tablet 3     lisinopril (PRINIVIL/ZESTRIL) 5 MG tablet Take 1 tablet (5 mg) by mouth daily 90 tablet 3     omega 3 1000 MG CAPS Take 2 g by mouth       rivaroxaban ANTICOAGULANT (XARELTO) 15 MG TABS tablet Take 1 tablet (15 mg) by mouth daily (with dinner) 90 tablet 3     DULoxetine (CYMBALTA) 60 MG EC capsule Take by mouth daily       [DISCONTINUED] clopidogrel (PLAVIX) 75 MG tablet Take 1 tablet (75 mg) by mouth daily 90 tablet 3     [DISCONTINUED] rivaroxaban ANTICOAGULANT (XARELTO) 15 MG TABS tablet Take 1 tablet (15 mg) by mouth daily (with dinner) 90 tablet 3     No facility-administered encounter medications on file as of 2018.        Again, thank you for allowing me to participate in the care of your patient.      Sincerely,    TEOFILO CASTILLO MD     [facility]  Care

## 2018-08-13 NOTE — LETTER
8/13/2018    James Fajardo MD  [address] [facility] [address]    RE: Issac Zelaya       Dear Colleague,    I had the pleasure of seeing Issac Zelaya in the [facility].    HPI and Plan:   See dictation  I recommend continuing current treatment plans in the chart.    No orders of the defined types were placed in this encounter.    No orders of the defined types were placed in this encounter.    Medications Discontinued During This Encounter   Medication Reason     clopidogrel (PLAVIX) 75 MG tablet Stopped by Patient         Encounter Diagnoses   Name Primary?     Hypertension goal BP (blood pressure) < 140/90      Morbid obesity due to excess calories (H)      Thoracic aortic aneurysm without rupture (H)      Stenosis of left carotid artery      Post fall => head tauma => SDH => [facility] hospital      Hyperlipidemia LDL goal <100      DOMINGO (obstructive sleep apnea)      Essential hypertension, benign      Chronic atrial fibrillation (H)      Essential hypertension with goal blood pressure less than 140/90        CURRENT MEDICATIONS:  Current Outpatient Prescriptions   Medication Sig Dispense Refill     atenolol (TENORMIN) 50 MG tablet 1 tab in morning. 135 tablet 3     atorvastatin (LIPITOR) 20 MG tablet Take 1 tablet (20 mg) by mouth daily 90 tablet 3     B-12 OR 1000 mg daily       CENTRUM OR None Entered       furosemide (LASIX) 20 MG tablet Take 1 tablet (20 mg) by mouth daily 90 tablet 3     lisinopril (PRINIVIL/ZESTRIL) 5 MG tablet Take 1 tablet (5 mg) by mouth daily 90 tablet 3     omega 3 1000 MG CAPS Take 2 g by mouth       rivaroxaban ANTICOAGULANT (XARELTO) 15 MG TABS tablet Take 1 tablet (15 mg) by mouth daily (with dinner) 90 tablet 3     DULoxetine (CYMBALTA) 60 MG EC capsule Take by mouth daily         ALLERGIES     Allergies   Allergen Reactions     Epinephrine Shortness Of Breath     Very rapid heartrate     Dabigatran Itching       PAST MEDICAL  HISTORY:  Past Medical History:   Diagnosis Date     Atrial fibrillation (H)      Carotid artery disease (H)      Coronary artery disease      Hypertension      Unspecified cerebral artery occlusion with cerebral infarction        PAST SURGICAL HISTORY:  Past Surgical History:   Procedure Laterality Date     C BYPASS GRAFT OTHR,CAROTID      carotid stent     COLONOSCOPY  9/24/2013    Dr. Yas LAMB       FAMILY HISTORY:  Family History   Problem Relation Age of Onset     HEART DISEASE Mother      Diabetes Father      No Known Problems Brother      No Known Problems Sister      No Known Problems Brother      No Known Problems Sister      Breast Cancer No family hx of        SOCIAL HISTORY:  Social History     Social History     Marital status:      Spouse name: N/A     Number of children: N/A     Years of education: N/A     Social History Main Topics     Smoking status: Former Smoker     Quit date: 1/1/1982     Smokeless tobacco: Never Used     Alcohol use 0.0 oz/week     0 Standard drinks or equivalent per week      Comment: 2-3 drinks per week     Drug use: No     Sexual activity: Not Currently     Partners: Female     Other Topics Concern     Caffeine Concern Yes     3 cups daily     Weight Concern Yes     Special Diet No     Exercise Yes     golfing 1 day per week     Social History Narrative         Review of Systems:  Skin:  Positive for bruising     Eyes:  Positive for glasses    ENT:  Negative      Respiratory:  Positive for dyspnea on exertion;CPAP;sleep apnea intermittent dyspnea on exertion    Cardiovascular:    Positive for;edema;fatigue    Gastroenterology: Negative      Genitourinary:  Positive for urinary frequency frequency during the day   Musculoskeletal:  Positive for joint pain;joint stiffness;arthritis;back pain pain in lower legs, back ache  Neurologic:  Positive for numbness or tingling of feet;numbness or tingling of hands occasionally has numbness and tingling in hands / feet  "  Psychiatric:  Negative      Heme/Lymph/Imm:  Positive for easy bruising;allergies RX allergies ,   Endocrine:  Negative        Physical Exam:  Vitals: /76 (BP Location: Right arm, Patient Position: Sitting, Cuff Size: Adult Large)  Pulse 78  Ht 1.816 m (5' 11.5\")  Wt 123 kg (271 lb 3.2 oz)  BMI 37.3 kg/m2    Constitutional:    obese;overweight      Skin:  warm and dry to the touch          Head:  normocephalic        Eyes:  pupils equal and round;sclera white        Lymph:      ENT:  no pallor or cyanosis        Neck:  JVP normal        Respiratory:  clear to auscultation         Cardiac: no S3 or S4;no murmurs, gallops or rubs detected irregularly irregular rhythm              not assessed this visit                                        GI:  abdomen soft;no masses;non-tender obese      Extremities and Muscular Skeletal:      bilateral LE edema;trace trace trace      Neurological:  affect appropriate        Psych:  oriented to time, person and place        Recent Lab Results:  LIPID RESULTS:  Lab Results   Component Value Date    CHOL 112 06/20/2017    HDL 34 (L) 06/20/2017    LDL 50 06/20/2017    TRIG 139 06/20/2017    CHOLHDLRATIO 2.7 06/29/2015       LIVER ENZYME RESULTS:  Lab Results   Component Value Date    AST 13 06/20/2017    ALT 19 06/20/2017       CBC RESULTS:  Lab Results   Component Value Date    WBC 7.6 05/13/2014    RBC 4.76 05/13/2014    HGB 16.2 05/13/2014    HCT 46.5 05/13/2014    MCV 98 05/13/2014    MCH 34.0 (H) 05/13/2014    MCHC 34.8 05/13/2014    RDW 13.5 05/13/2014     05/13/2014       BMP RESULTS:  Lab Results   Component Value Date     06/20/2017    POTASSIUM 4.5 06/20/2017    CHLORIDE 105 06/20/2017    CO2 26 06/20/2017    ANIONGAP 9 06/20/2017     (H) 06/20/2017    BUN 18 06/20/2017    CR 1.09 06/20/2017    GFRESTIMATED 65 06/20/2017    GFRESTBLACK 78 06/20/2017    FILIPE 9.1 06/20/2017        A1C RESULTS:  Lab Results   Component Value Date    A1C 5.6 " 04/15/2015       INR RESULTS:  No results found for: INR        CC  Dick Clemens MD  [address]  [place] [address]                    Service Date: 08/13/2018      HISTORY OF PRESENT ILLNESS:  I got together with Karthik Zelaya today.  He is 83 and chronically overweight.  He has a history of hypertension.  He has had carotid artery disease, sleep apnea and chronic atrial fibrillation.      He came today asking if he had to stay on these medicines forever.  I think most specifically, he was talking about Xarelto and Plavix.  He is on the Xarelto because of his atrial fibrillation.  He is on Plavix because of a history of carotid disease and concerns for TIA/CVA.      In the past year, he has not had anything to suggest classic angina or obvious heart failure.  He does have some swelling of the lower legs that is a bit more marked than it has been in the past.      He says his level of activity is stable.  He does not feel that he is particularly limited in terms of acts of daily living.      He is short of breath if he really pushes himself, but he rarely does so.  He has lower leg edema.      REVIEW OF SYSTEMS:    UPPER AND LOWER GASTROINTESTINAL:  Negative.   GENITOURINARY:  Positive for frequency of urination.   MUSCULOSKELETAL:  Positive for some achiness of the backs and lower legs.   NEUROLOGIC:  Positive for numbness and tingling of the feet compatible with neuropathy.     The remaining review is negative.  He does have easy bruising, but it is no surprise in the context of being on Xarelto and Plavix.  He has had no obvious bleeding otherwise.        I spent over 20 minutes alone just reviewing the issues of taking anticoagulants, the way that they work, how they work, why they work and why we have recommended Plavix and Xarelto together.  He is on a smaller dose of Xarelto at 15 mg and a standard dose of Plavix at 75 mg.  I reviewed with him the fact that he is in a position he could stop these  medicines should he feel it necessary or appropriate, and we would stop it should there be any obvious bleeding or hemorrhagic issues.      I explained the risk of stroke and the mechanisms with regards to atrial fibrillation.  I discussed plugged arteries and carotid artery disease that might cause stroke or TIA.      His blood pressure has been under nice control.  His lipid profiles have been wonderfully low with total cholesterol of 112, LDL 78, HDL 50, triglycerides 139.      PHYSICAL EXAMINATION:   VITAL SIGNS:  Showed a blood pressure 118/70, heart rate 80 beats a minute and mildly irregular.  He weighs 271 pounds, which for him is stable.   NECK:  He had no neck vein distention or bruit at 30 degrees.   HEART:  Irregular without gallop or murmur.   LUNGS:  Clear.   ABDOMEN:  Obese, firm, nontender without pain, mass or guarding.   EXTREMITIES:  Show +1 to +2 bilateral lower leg edema.  This seems more marked than in the past.      CURRENT MEDICATIONS:   1.  Tenormin 50 mg a day.   2.  Atorvastatin 20 mg at bedtime.   3.  Plavix 75 mg a day.   4.  Lasix 20 mg a day.   5.  Lisinopril 5 mg a day.   6.  Xarelto 15 mg a day.   7.  Cymbalta 60 mg a day.     He is also on Omega 3 fatty acids and multivitamins and vitamin B12.      With all that, he is actually feeling pretty comfortable with life.  He is annoyed with the lower leg peripheral neuropathy, swelling is a bit more marked.  At this juncture, I did not address it.  He is already on 20 mg of Lasix a day.      I did mention that weight loss would be helpful, but it is a bit of a pipe dream at age 83.  I discussed carbohydrate abstinence, but all in all he smiled and said he would try.      IMPRESSION:   1.  Controlled atrial fibrillation.   2.  Xarelto for anticoagulation for stroke prophylaxis.   3.  Plavix because of peripheral vascular and carotid artery disease.   4.  Overweight.   5.  Sleep apnea.   6.  Mild aortic root enlargement at 4.3 cm.       PLAN:  I will see him in a year.  We will get an echo at that time for surveillance.      I think his prognosis is good.  Should he have bleeding complications, it would demand a reassessment of his anticoagulation therapy.      cc:   James Fajardo MD    [facility]    [id number] [place] [address]  [id number]         TEOFILO CLEMENS MD, [facility]             D: 2018   T: 2018   MT: FRAN      Name:     MARNI GONZALEZ   MRN:      [id number]        Account:      [id number]   :      1935           Service Date: 2018      Document: [id number]       Thank you for allowing me to participate in the care of your patient.      Sincerely,     TEOFILO CLEMENS MD     [facility]    cc:   Teofilo Clemens MD  [address] [place] [id number]

## 2018-08-13 NOTE — PROGRESS NOTES
HPI and Plan:   See dictation  I recommend continuing current treatment plans in the chart.    No orders of the defined types were placed in this encounter.    No orders of the defined types were placed in this encounter.    Medications Discontinued During This Encounter   Medication Reason     clopidogrel (PLAVIX) 75 MG tablet Stopped by Patient         Encounter Diagnoses   Name Primary?     Hypertension goal BP (blood pressure) < 140/90      Morbid obesity due to excess calories (H)      Thoracic aortic aneurysm without rupture (H)      Stenosis of left carotid artery      Post fall => head tauma => SDH => [facility] hospital      Hyperlipidemia LDL goal <100      DOMINGO (obstructive sleep apnea)      Essential hypertension, benign      Chronic atrial fibrillation (H)      Essential hypertension with goal blood pressure less than 140/90        CURRENT MEDICATIONS:  Current Outpatient Prescriptions   Medication Sig Dispense Refill     atenolol (TENORMIN) 50 MG tablet 1 tab in morning. 135 tablet 3     atorvastatin (LIPITOR) 20 MG tablet Take 1 tablet (20 mg) by mouth daily 90 tablet 3     B-12 OR 1000 mg daily       CENTRUM OR None Entered       furosemide (LASIX) 20 MG tablet Take 1 tablet (20 mg) by mouth daily 90 tablet 3     lisinopril (PRINIVIL/ZESTRIL) 5 MG tablet Take 1 tablet (5 mg) by mouth daily 90 tablet 3     omega 3 1000 MG CAPS Take 2 g by mouth       rivaroxaban ANTICOAGULANT (XARELTO) 15 MG TABS tablet Take 1 tablet (15 mg) by mouth daily (with dinner) 90 tablet 3     DULoxetine (CYMBALTA) 60 MG EC capsule Take by mouth daily         ALLERGIES     Allergies   Allergen Reactions     Epinephrine Shortness Of Breath     Very rapid heartrate     Dabigatran Itching       PAST MEDICAL HISTORY:  Past Medical History:   Diagnosis Date     Atrial fibrillation (H)      Carotid artery disease (H)      Coronary artery disease      Hypertension      Unspecified cerebral artery occlusion with cerebral infarction         PAST SURGICAL HISTORY:  Past Surgical History:   Procedure Laterality Date     C BYPASS GRAFT OTHR,CAROTID      carotid stent     COLONOSCOPY  9/24/2013    Dr. Yas LAMB       FAMILY HISTORY:  Family History   Problem Relation Age of Onset     HEART DISEASE Mother      Diabetes Father      No Known Problems Brother      No Known Problems Sister      No Known Problems Brother      No Known Problems Sister      Breast Cancer No family hx of        SOCIAL HISTORY:  Social History     Social History     Marital status:      Spouse name: N/A     Number of children: N/A     Years of education: N/A     Social History Main Topics     Smoking status: Former Smoker     Quit date: 1/1/1982     Smokeless tobacco: Never Used     Alcohol use 0.0 oz/week     0 Standard drinks or equivalent per week      Comment: 2-3 drinks per week     Drug use: No     Sexual activity: Not Currently     Partners: Female     Other Topics Concern     Caffeine Concern Yes     3 cups daily     Weight Concern Yes     Special Diet No     Exercise Yes     golfing 1 day per week     Social History Narrative         Review of Systems:  Skin:  Positive for bruising     Eyes:  Positive for glasses    ENT:  Negative      Respiratory:  Positive for dyspnea on exertion;CPAP;sleep apnea intermittent dyspnea on exertion    Cardiovascular:    Positive for;edema;fatigue    Gastroenterology: Negative      Genitourinary:  Positive for urinary frequency frequency during the day   Musculoskeletal:  Positive for joint pain;joint stiffness;arthritis;back pain pain in lower legs, back ache  Neurologic:  Positive for numbness or tingling of feet;numbness or tingling of hands occasionally has numbness and tingling in hands / feet   Psychiatric:  Negative      Heme/Lymph/Imm:  Positive for easy bruising;allergies RX allergies ,   Endocrine:  Negative        Physical Exam:  Vitals: /76 (BP Location: Right arm, Patient Position: Sitting, Cuff Size: Adult  "Large)  Pulse 78  Ht 1.816 m (5' 11.5\")  Wt 123 kg (271 lb 3.2 oz)  BMI 37.3 kg/m2    Constitutional:    obese;overweight      Skin:  warm and dry to the touch          Head:  normocephalic        Eyes:  pupils equal and round;sclera white        Lymph:      ENT:  no pallor or cyanosis        Neck:  JVP normal        Respiratory:  clear to auscultation         Cardiac: no S3 or S4;no murmurs, gallops or rubs detected irregularly irregular rhythm              not assessed this visit                                        GI:  abdomen soft;no masses;non-tender obese      Extremities and Muscular Skeletal:      bilateral LE edema;trace trace trace      Neurological:  affect appropriate        Psych:  oriented to time, person and place        Recent Lab Results:  LIPID RESULTS:  Lab Results   Component Value Date    CHOL 112 06/20/2017    HDL 34 (L) 06/20/2017    LDL 50 06/20/2017    TRIG 139 06/20/2017    CHOLHDLRATIO 2.7 06/29/2015       LIVER ENZYME RESULTS:  Lab Results   Component Value Date    AST 13 06/20/2017    ALT 19 06/20/2017       CBC RESULTS:  Lab Results   Component Value Date    WBC 7.6 05/13/2014    RBC 4.76 05/13/2014    HGB 16.2 05/13/2014    HCT 46.5 05/13/2014    MCV 98 05/13/2014    MCH 34.0 (H) 05/13/2014    MCHC 34.8 05/13/2014    RDW 13.5 05/13/2014     05/13/2014       BMP RESULTS:  Lab Results   Component Value Date     06/20/2017    POTASSIUM 4.5 06/20/2017    CHLORIDE 105 06/20/2017    CO2 26 06/20/2017    ANIONGAP 9 06/20/2017     (H) 06/20/2017    BUN 18 06/20/2017    CR 1.09 06/20/2017    GFRESTIMATED 65 06/20/2017    GFRESTBLACK 78 06/20/2017    FILIPE 9.1 06/20/2017        A1C RESULTS:  Lab Results   Component Value Date    A1C 5.6 04/15/2015       INR RESULTS:  No results found for: INR        CC  Dick Clemens MD  [address]  [place] [address]                  "

## 2018-08-14 NOTE — PROGRESS NOTES
Service Date: 08/13/2018      HISTORY OF PRESENT ILLNESS:  I got together with Karthik Zelaya today.  He is 83 and chronically overweight.  He has a history of hypertension.  He has had carotid artery disease, sleep apnea and chronic atrial fibrillation.      He came today asking if he had to stay on these medicines forever.  I think most specifically, he was talking about Xarelto and Plavix.  He is on the Xarelto because of his atrial fibrillation.  He is on Plavix because of a history of carotid disease and concerns for TIA/CVA.      In the past year, he has not had anything to suggest classic angina or obvious heart failure.  He does have some swelling of the lower legs that is a bit more marked than it has been in the past.      He says his level of activity is stable.  He does not feel that he is particularly limited in terms of acts of daily living.      He is short of breath if he really pushes himself, but he rarely does so.  He has lower leg edema.      REVIEW OF SYSTEMS:    UPPER AND LOWER GASTROINTESTINAL:  Negative.   GENITOURINARY:  Positive for frequency of urination.   MUSCULOSKELETAL:  Positive for some achiness of the backs and lower legs.   NEUROLOGIC:  Positive for numbness and tingling of the feet compatible with neuropathy.     The remaining review is negative.  He does have easy bruising, but it is no surprise in the context of being on Xarelto and Plavix.  He has had no obvious bleeding otherwise.        I spent over 20 minutes alone just reviewing the issues of taking anticoagulants, the way that they work, how they work, why they work and why we have recommended Plavix and Xarelto together.  He is on a smaller dose of Xarelto at 15 mg and a standard dose of Plavix at 75 mg.  I reviewed with him the fact that he is in a position he could stop these medicines should he feel it necessary or appropriate, and we would stop it should there be any obvious bleeding or hemorrhagic issues.      I explained  the risk of stroke and the mechanisms with regards to atrial fibrillation.  I discussed plugged arteries and carotid artery disease that might cause stroke or TIA.      His blood pressure has been under nice control.  His lipid profiles have been wonderfully low with total cholesterol of 112, LDL 78, HDL 50, triglycerides 139.      PHYSICAL EXAMINATION:   VITAL SIGNS:  Showed a blood pressure 118/70, heart rate 80 beats a minute and mildly irregular.  He weighs 271 pounds, which for him is stable.   NECK:  He had no neck vein distention or bruit at 30 degrees.   HEART:  Irregular without gallop or murmur.   LUNGS:  Clear.   ABDOMEN:  Obese, firm, nontender without pain, mass or guarding.   EXTREMITIES:  Show +1 to +2 bilateral lower leg edema.  This seems more marked than in the past.      CURRENT MEDICATIONS:   1.  Tenormin 50 mg a day.   2.  Atorvastatin 20 mg at bedtime.   3.  Plavix 75 mg a day.   4.  Lasix 20 mg a day.   5.  Lisinopril 5 mg a day.   6.  Xarelto 15 mg a day.   7.  Cymbalta 60 mg a day.     He is also on Omega 3 fatty acids and multivitamins and vitamin B12.      With all that, he is actually feeling pretty comfortable with life.  He is annoyed with the lower leg peripheral neuropathy, swelling is a bit more marked.  At this juncture, I did not address it.  He is already on 20 mg of Lasix a day.      I did mention that weight loss would be helpful, but it is a bit of a pipe dream at age 83.  I discussed carbohydrate abstinence, but all in all he smiled and said he would try.      IMPRESSION:   1.  Controlled atrial fibrillation.   2.  Xarelto for anticoagulation for stroke prophylaxis.   3.  Plavix because of peripheral vascular and carotid artery disease.   4.  Overweight.   5.  Sleep apnea.   6.  Mild aortic root enlargement at 4.3 cm.      PLAN:  I will see him in a year.  We will get an echo at that time for surveillance.      I think his prognosis is good.  Should he have bleeding  complications, it would demand a reassessment of his anticoagulation therapy.      cc:   [place] [address]         TEOFILO CASTILLO MD, [facility]             D: 2018   T: 2018   MT: FRAN      Name:     MARNI GONZALEZ   MRN:      [id number]        Account:      [id number]   :      1935           Service Date: 2018      Document: [id number]

## 2018-09-18 ENCOUNTER — OFFICE VISIT (OUTPATIENT)
Dept: FAMILY MEDICINE | Facility: CLINIC | Age: 83
End: 2018-09-18
Payer: COMMERCIAL

## 2018-09-18 VITALS
TEMPERATURE: 98.1 F | HEART RATE: 75 BPM | RESPIRATION RATE: 14 BRPM | SYSTOLIC BLOOD PRESSURE: 125 MMHG | DIASTOLIC BLOOD PRESSURE: 85 MMHG | WEIGHT: 272.3 LBS | BODY MASS INDEX: 37.45 KG/M2 | OXYGEN SATURATION: 93 %

## 2018-09-18 DIAGNOSIS — Z23 NEED FOR PROPHYLACTIC VACCINATION AND INOCULATION AGAINST INFLUENZA: ICD-10-CM

## 2018-09-18 DIAGNOSIS — G60.9 HEREDITARY AND IDIOPATHIC PERIPHERAL NEUROPATHY: ICD-10-CM

## 2018-09-18 DIAGNOSIS — I65.22 CAROTID STENOSIS, LEFT: Primary | ICD-10-CM

## 2018-09-18 DIAGNOSIS — G47.33 OSA (OBSTRUCTIVE SLEEP APNEA): ICD-10-CM

## 2018-09-18 LAB
CREAT UR-MCNC: 123 MG/DL
HBA1C MFR BLD: 5.8 % (ref 0–5.6)
MICROALBUMIN UR-MCNC: 8 MG/L
MICROALBUMIN/CREAT UR: 6.54 MG/G CR (ref 0–17)

## 2018-09-18 PROCEDURE — 90662 IIV NO PRSV INCREASED AG IM: CPT | Performed by: FAMILY MEDICINE

## 2018-09-18 PROCEDURE — 84443 ASSAY THYROID STIM HORMONE: CPT | Performed by: FAMILY MEDICINE

## 2018-09-18 PROCEDURE — G0008 ADMIN INFLUENZA VIRUS VAC: HCPCS | Performed by: FAMILY MEDICINE

## 2018-09-18 PROCEDURE — 83036 HEMOGLOBIN GLYCOSYLATED A1C: CPT | Performed by: FAMILY MEDICINE

## 2018-09-18 PROCEDURE — 99214 OFFICE O/P EST MOD 30 MIN: CPT | Mod: 25 | Performed by: FAMILY MEDICINE

## 2018-09-18 PROCEDURE — 82043 UR ALBUMIN QUANTITATIVE: CPT | Performed by: FAMILY MEDICINE

## 2018-09-18 PROCEDURE — 36415 COLL VENOUS BLD VENIPUNCTURE: CPT | Performed by: FAMILY MEDICINE

## 2018-09-18 NOTE — PROGRESS NOTES
SUBJECTIVE:   Issac Zelaya is a 83 year old male who presents to clinic today for the following health issues:        Medication Followup of all carotid stenosis : neuropathy , lipids Taking Medication as prescribed: yes    Side Effects:  None    Medication Helping Symptoms:  yes   Wei going to consult a Stem Cell therapist   Neurologist : Dr. Александр Munoz questions radiculopathy vs neuropathy and options include surgery, Wei isn't interested      REVIEW OF SYSTEMS    Generally has been other than foot pain  feeling well until this episode. No problems with vision, hearing, dental or neck pain.Has hph airborne or ingestion allergy  No chest pain, palpitations, dyspnea, change in bowel habits, blood  in stool or dyspepsia.  No rashes, changing moles, weakness, lassitude or back problems.  No chronic issues . No dysuria  Patient not  a smoker. No problems with significant headaches.  Problem list and histories reviewed & adjusted, as indicated.  Additional history:   He had a subdural hematoma a couple of years ago with excellent recovery   Past Medical History:   Diagnosis Date     Atrial fibrillation (H)      Carotid artery disease (H)      Coronary artery disease      Hypertension      Unspecified cerebral artery occlusion with cerebral infarction        Past Surgical History:   Procedure Laterality Date     C BYPASS GRAFT OTHR,CAROTID      carotid stent     COLONOSCOPY  9/24/2013    Dr. Yas LAMB       Family History   Problem Relation Age of Onset     HEART DISEASE Mother      Diabetes Father      No Known Problems Brother      No Known Problems Sister      No Known Problems Brother      No Known Problems Sister      Breast Cancer No family hx of        Social History   Substance Use Topics     Smoking status: Former Smoker     Quit date: 1/1/1982     Smokeless tobacco: Never Used     Alcohol use 0.0 oz/week     0 Standard drinks or equivalent per week      Comment: 2-3 drinks per week     On exam the vital  signs are stable  Weight is Body mass index is 37.45 kg/(m^2).   Eyes show tommie  No neck masses or thyromegaly.Ear nose and throat shows normal   No bruits, murmers, rubs or extrasounds. No cardiomegaly or chest wall tenderness. Lungs clear, no abdominal masses or organomegaly. No CVA tenderness.  Skin eval no rash   No hernias, good range of motion neck, back and extremities. No abnormal skin lesions. Normal genitalia. Good peripheral pulses. No adenopathy.  Normal gait and stance. Neck is supple.  Back exam shows good rom, poor sensation to both feet.     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

## 2018-09-18 NOTE — PROGRESS NOTES

## 2018-09-18 NOTE — MR AVS SNAPSHOT
After Visit Summary   9/18/2018    Issac Zelaya    MRN: [id number]           Patient Information     Date Of Birth          1935        Visit Information        Provider Department      9/18/2018 9:15 AM James Fajardo MD [facility]        Today's Diagnoses     Carotid stenosis, left    -  1       Follow-ups after your visit        Follow-up notes from your care team     Return in about 1 year (around 9/18/2019) for Physical Exam.      Future tests that were ordered for you today     Open Future Orders        Priority Expected Expires Ordered    US Carotid Bilateral Routine  9/18/2019 9/18/2018            Who to contact     If you have questions or need follow up information about today's clinic visit or your schedule please contact [facility] directly at [phone].  Normal or non-critical lab and imaging results will be communicated to you by MyChart, letter or phone within 4 business days after the clinic has received the results. If you do not hear from us within 7 days, please contact the clinic through MyChart or phone. If you have a critical or abnormal lab result, we will notify you by phone as soon as possible.  Submit refill requests through [employer] or call your pharmacy and they will forward the refill request to us. Please allow 3 business days for your refill to be completed.          Additional Information About Your Visit        MyChart Information     [employer] gives you secure access to your electronic health record. If you see a primary care provider, you can also send messages to your care team and make appointments. If you have questions, please call your primary care clinic.  If you do not have a primary care provider, please call [phone] and they will assist you.        Care EveryWhere ID     This is your Care EveryWhere ID. This could be used by other organizations to access your [facility] medical records  [id number]         Your Vitals Were     Pulse Temperature Respirations Pulse Oximetry BMI (Body Mass Index)       75 98.1  F (36.7  C) (Oral) 14 93% 37.45 kg/m2        Blood Pressure from Last 3 Encounters:   09/18/18 125/85   08/13/18 118/76   06/27/17 116/70    Weight from Last 3 Encounters:   09/18/18 272 lb 4.8 oz (123.5 kg)   08/13/18 271 lb 3.2 oz (123 kg)   06/27/17 273 lb 3.2 oz (123.9 kg)               Primary Care Provider Office Phone # Fax #    James Acosta Fajardo [phone] [address] [facility] [address]        Equal Access to Services     NONI ABEL : Hadii aad ku hadasho Soomaali, waaxda luqadaha, qaybta kaalmada adeegyada, geraldine hickey. So [facility] [phone].    ATENCIÓN: Si habla español, tiene a spear disposición servicios gratuitos de asistencia lingüística. Ll[facility] [phone].    We comply with applicable federal civil rights laws and [place] laws. We do not discriminate on the basis of race, color, national origin, age, disability, sex, sexual orientation, or gender identity.            Thank you!     Thank you for choosing [facility]  for your care. Our goal is always to provide you with excellent care. Hearing back from our patients is one way we can continue to improve our services. Please take a few minutes to complete the written survey that you may receive in the mail after your visit with us. Thank you!             Your Updated Medication List - Protect others around you: Learn how to safely use, store and throw away your medicines at www.disposemymeds.org.          This list is accurate as of 9/18/18  9:43 AM.  Always use your most recent med list.                   Brand Name Dispense Instructions for use Diagnosis    atenolol 50 MG tablet    TENORMIN    135 tablet    1 tab in morning.    Essential hypertension with goal blood pressure less than 140/90, DOMINGO (obstructive sleep apnea), Essential hypertension, benign,  Chronic atrial fibrillation (H)       atorvastatin 20 MG tablet    LIPITOR    90 tablet    Take 1 tablet (20 mg) by mouth daily    Hyperlipidemia LDL goal <100, Stenosis of left carotid artery       B-12 PO      1000 mg daily        CENTRUM PO      None Entered        clopidogrel 75 MG tablet    PLAVIX    90 tablet    Take 1 tablet (75 mg) by mouth daily    DOMINGO (obstructive sleep apnea), Essential hypertension, benign, Hypertension goal BP (blood pressure) < 140/90, Chronic atrial fibrillation (H)       DULoxetine 60 MG EC capsule    CYMBALTA     Take by mouth daily        furosemide 20 MG tablet    LASIX    90 tablet    Take 1 tablet (20 mg) by mouth daily    DOMINGO (obstructive sleep apnea)       lisinopril 5 MG tablet    PRINIVIL/ZESTRIL    90 tablet    Take 1 tablet (5 mg) by mouth daily    Essential hypertension, benign, DOMINGO (obstructive sleep apnea), Hypertension goal BP (blood pressure) < 140/90, Chronic atrial fibrillation (H)       omega 3 1000 MG Caps      Take 2 g by mouth        rivaroxaban ANTICOAGULANT 15 MG Tabs tablet    XARELTO    90 tablet    Take 1 tablet (15 mg) by mouth daily (with dinner)    Chronic atrial fibrillation (H), Essential hypertension, benign, Hypertension goal BP (blood pressure) < 140/90, DOMINGO (obstructive sleep apnea)

## 2018-09-19 LAB — TSH SERPL DL<=0.005 MIU/L-ACNC: 1.84 MU/L (ref 0.4–4)

## 2018-09-27 ENCOUNTER — HOSPITAL ENCOUNTER (OUTPATIENT)
Dept: ULTRASOUND IMAGING | Facility: CLINIC | Age: 83
Discharge: HOME | End: 2018-09-27
Attending: FAMILY MEDICINE | Admitting: FAMILY MEDICINE
Payer: COMMERCIAL

## 2018-09-27 DIAGNOSIS — I65.22 CAROTID STENOSIS, LEFT: ICD-10-CM

## 2018-09-27 PROCEDURE — 93880 EXTRACRANIAL BILAT STUDY: CPT

## 2018-10-08 ENCOUNTER — OFFICE VISIT (OUTPATIENT)
Dept: FAMILY MEDICINE | Facility: CLINIC | Age: 83
End: 2018-10-08
Payer: COMMERCIAL

## 2018-10-08 ENCOUNTER — RADIANT APPOINTMENT (OUTPATIENT)
Dept: GENERAL RADIOLOGY | Facility: CLINIC | Age: 83
End: 2018-10-08
Attending: FAMILY MEDICINE
Payer: COMMERCIAL

## 2018-10-08 VITALS
OXYGEN SATURATION: 96 % | DIASTOLIC BLOOD PRESSURE: 61 MMHG | HEART RATE: 69 BPM | WEIGHT: 269.2 LBS | BODY MASS INDEX: 37.02 KG/M2 | TEMPERATURE: 97.3 F | SYSTOLIC BLOOD PRESSURE: 124 MMHG

## 2018-10-08 DIAGNOSIS — R06.09 OTHER FORM OF DYSPNEA: ICD-10-CM

## 2018-10-08 DIAGNOSIS — J20.9 ACUTE BRONCHITIS WITH SYMPTOMS > 10 DAYS: Primary | ICD-10-CM

## 2018-10-08 LAB
BASOPHILS # BLD AUTO: 0.1 10E9/L (ref 0–0.2)
BASOPHILS NFR BLD AUTO: 1 %
DIFFERENTIAL METHOD BLD: ABNORMAL
EOSINOPHIL # BLD AUTO: 0.5 10E9/L (ref 0–0.7)
EOSINOPHIL NFR BLD AUTO: 5.7 %
ERYTHROCYTE [DISTWIDTH] IN BLOOD BY AUTOMATED COUNT: 13.4 % (ref 10–15)
HCT VFR BLD AUTO: 44.6 % (ref 40–53)
HGB BLD-MCNC: 15.7 G/DL (ref 13.3–17.7)
LYMPHOCYTES # BLD AUTO: 1.6 10E9/L (ref 0.8–5.3)
LYMPHOCYTES NFR BLD AUTO: 17.9 %
MCH RBC QN AUTO: 34.4 PG (ref 26.5–33)
MCHC RBC AUTO-ENTMCNC: 35.2 G/DL (ref 31.5–36.5)
MCV RBC AUTO: 98 FL (ref 78–100)
MONOCYTES # BLD AUTO: 1 10E9/L (ref 0–1.3)
MONOCYTES NFR BLD AUTO: 11.9 %
NEUTROPHILS # BLD AUTO: 5.5 10E9/L (ref 1.6–8.3)
NEUTROPHILS NFR BLD AUTO: 63.5 %
PLATELET # BLD AUTO: 210 10E9/L (ref 150–450)
RBC # BLD AUTO: 4.56 10E12/L (ref 4.4–5.9)
WBC # BLD AUTO: 8.7 10E9/L (ref 4–11)

## 2018-10-08 PROCEDURE — 71046 X-RAY EXAM CHEST 2 VIEWS: CPT

## 2018-10-08 PROCEDURE — 36415 COLL VENOUS BLD VENIPUNCTURE: CPT | Performed by: FAMILY MEDICINE

## 2018-10-08 PROCEDURE — 85025 COMPLETE CBC W/AUTO DIFF WBC: CPT | Performed by: FAMILY MEDICINE

## 2018-10-08 PROCEDURE — 99214 OFFICE O/P EST MOD 30 MIN: CPT | Performed by: FAMILY MEDICINE

## 2018-10-08 NOTE — PROGRESS NOTES
SUBJECTIVE:   Issac Zelaya is a 83 year old male who presents to clinic today for the following health issues:    /61 (BP Location: Left arm, Patient Position: Sitting, Cuff Size: Adult Large)  Pulse 69  Temp 97.3  F (36.3  C) (Oral)  Wt 269 lb 3.2 oz (122.1 kg)  SpO2 96%  BMI 37.02 kg/m2    Acute Illness   Acute illness concerns: cold like symtpoms  Onset: 1 week    Fever: no    Chills/Sweats: YES    Headache (location?): YES- all over    Sinus Pressure:no    Conjunctivitis:  no    Ear Pain: no    Rhinorrhea: YES    Congestion: no    Sore Throat: no     Cough: no    Wheeze: no    Decreased Appetite: YES    Nausea: YES    Vomiting: YES    Diarrhea:  no    Dysuria/Freq.: no    Fatigue/Achiness: YES    Sick/Strep Exposure: no       On exam the vital signs are stable  Weight is Body mass index is 37.02 kg/(m^2).   Eyes show cas, no nystagmus  No neck masses or thyromegaly.Ear nose and throat shows no swelling  No bruits, murmers, rubs or extrasounds. No cardiomegaly or chest wall tenderness. Lungs clear, no abdominal masses or organomegaly. No CVA tenderness.  Skin eval no rash   No hernias, good range of motion neck, back and extremities. No abnormal skin lesions. Normal genitalia. Good peripheral pulses. No adenopathy.  Normal gait and stance. Neck is supple.  Back exam shows good rom     (J20.9) Acute bronchitis with symptoms > 10 days  (primary encounter diagnosis)  Comment:   Plan: CBC with platelets and differential, XR Chest 2        Views, amoxicillin-clavulanate (AUGMENTIN)         875-125 MG per tablet            (R06.09) Other form of dyspnea  Comment:   Plan: Echocardiogram Complete        Report any lack of progress on his sinus and cough issues,

## 2018-10-08 NOTE — MR AVS SNAPSHOT
After Visit Summary   10/8/2018    Issac Zelaya    MRN: [id number]           Patient Information     Date Of Birth          1935        Visit Information        Provider Department      10/8/2018 9:45 AM James Fajardo MD [facility]        Today's Diagnoses     Acute bronchitis with symptoms > 10 days    -  1       Follow-ups after your visit        Follow-up notes from your care team     Return in about 4 days (around 10/12/2018).      Who to contact     If you have questions or need follow up information about today's clinic visit or your schedule please contact [facility] directly at [phone].  Normal or non-critical lab and imaging results will be communicated to you by MyChart, letter or phone within 4 business days after the clinic has received the results. If you do not hear from us within 7 days, please contact the clinic through [employer] or phone. If you have a critical or abnormal lab result, we will notify you by phone as soon as possible.  Submit refill requests through [employer] or call your pharmacy and they will forward the refill request to us. Please allow 3 business days for your refill to be completed.          Additional Information About Your Visit        MyChart Information     [employer] gives you secure access to your electronic health record. If you see a primary care provider, you can also send messages to your care team and make appointments. If you have questions, please call your primary care clinic.  If you do not have a primary care provider, please call [phone] and they will assist you.        Care EveryWhere ID     This is your Care EveryWhere ID. This could be used by other organizations to access your [facility] medical records  [id number]        Your Vitals Were     Pulse Temperature Pulse Oximetry BMI (Body Mass Index)          69 97.3  F (36.3  C) (Oral) 96% 37.02 kg/m2         Blood Pressure from Last 3  Encounters:   10/08/18 124/61   09/18/18 125/85   08/13/18 118/76    Weight from Last 3 Encounters:   10/08/18 269 lb 3.2 oz (122.1 kg)   09/18/18 272 lb 4.8 oz (123.5 kg)   08/13/18 271 lb 3.2 oz (123 kg)              We Performed the Following     CBC with platelets and differential     XR Chest 2 Views          Today's Medication Changes          These changes are accurate as of 10/8/18 10:55 AM.  If you have any questions, ask your nurse or doctor.               Start taking these medicines.        Dose/Directions    amoxicillin-clavulanate 875-125 MG per tablet   Commonly known as:  AUGMENTIN   Used for:  Acute bronchitis with symptoms > 10 days   Started by:  James Fajardo MD        Dose:  1 tablet   Take 1 tablet by mouth 2 times daily Take with food   Quantity:  20 tablet   Refills:  0            Where to get your medicines      These medications were sent to [facility] [address] [place] [address] [address] [place] [address] [address] [facility] [address]     Phone:  [phone]     amoxicillin-clavulanate 875-125 MG per tablet                Primary Care Provider Office Phone # Fax #    James Fajardo [phone] [address] [facility] [address]        Equal Access to Services     LIZETTE ABEL AH: Hadii joe jones hadasho Sotammy, waaxda luqadaha, qaybta kaalmada emmanuelyada, geraldine hickey. So [facility] [phone].    ATENCIÓN: Si habla español, tiene a spear disposición servicios gratuitos de asistencia lingüística. Carlos al [phone].    We comply with applicable federal civil rights laws and [place] laws. We do not discriminate on the basis of race, color, national origin, age, disability, sex, sexual orientation, or gender identity.            Thank you!     Thank you for choosing [facility]  for your care. Our goal is always to provide you with excellent care. Hearing  back from our patients is one way we can continue to improve our services. Please take a few minutes to complete the written survey that you may receive in the mail after your visit with us. Thank you!             Your Updated Medication List - Protect others around you: Learn how to safely use, store and throw away your medicines at www.disposemymeds.org.          This list is accurate as of 10/8/18 10:55 AM.  Always use your most recent med list.                   Brand Name Dispense Instructions for use Diagnosis    amoxicillin-clavulanate 875-125 MG per tablet    AUGMENTIN    20 tablet    Take 1 tablet by mouth 2 times daily Take with food    Acute bronchitis with symptoms > 10 days       atenolol 50 MG tablet    TENORMIN    135 tablet    1 tab in morning.    Essential hypertension with goal blood pressure less than 140/90, DOMINGO (obstructive sleep apnea), Essential hypertension, benign, Chronic atrial fibrillation (H)       atorvastatin 20 MG tablet    LIPITOR    90 tablet    Take 1 tablet (20 mg) by mouth daily    Hyperlipidemia LDL goal <100, Stenosis of left carotid artery       B-12 PO      1000 mg daily        CENTRUM PO      None Entered        clopidogrel 75 MG tablet    PLAVIX    90 tablet    Take 1 tablet (75 mg) by mouth daily    DOMINGO (obstructive sleep apnea), Essential hypertension, benign, Hypertension goal BP (blood pressure) < 140/90, Chronic atrial fibrillation (H)       DULoxetine 60 MG EC capsule    CYMBALTA     Take by mouth daily        furosemide 20 MG tablet    LASIX    90 tablet    Take 1 tablet (20 mg) by mouth daily    DOMINGO (obstructive sleep apnea)       lisinopril 5 MG tablet    PRINIVIL/ZESTRIL    90 tablet    Take 1 tablet (5 mg) by mouth daily    Essential hypertension, benign, DOMINGO (obstructive sleep apnea), Hypertension goal BP (blood pressure) < 140/90, Chronic atrial fibrillation (H)       omega 3 1000 MG Caps      Take 2 g by mouth        rivaroxaban ANTICOAGULANT 15 MG Tabs tablet     XARELTO    90 tablet    Take 1 tablet (15 mg) by mouth daily (with dinner)    Chronic atrial fibrillation (H), Essential hypertension, benign, Hypertension goal BP (blood pressure) < 140/90, DOMINGO (obstructive sleep apnea)

## 2018-10-26 ENCOUNTER — HOSPITAL ENCOUNTER (OUTPATIENT)
Dept: CARDIOLOGY | Facility: CLINIC | Age: 83
Discharge: HOME | End: 2018-10-26
Attending: FAMILY MEDICINE | Admitting: FAMILY MEDICINE
Payer: COMMERCIAL

## 2018-10-26 DIAGNOSIS — R06.09 OTHER FORM OF DYSPNEA: ICD-10-CM

## 2018-10-26 PROCEDURE — 93306 TTE W/DOPPLER COMPLETE: CPT | Mod: 26 | Performed by: INTERNAL MEDICINE

## 2018-10-26 PROCEDURE — 25500064 ZZH RX 255 OP 636: Performed by: FAMILY MEDICINE

## 2018-10-26 PROCEDURE — 40000264 ECHO COMPLETE WITH OPTISON

## 2018-10-26 RX ADMIN — HUMAN ALBUMIN MICROSPHERES AND PERFLUTREN 2 ML: 10; .22 INJECTION, SOLUTION INTRAVENOUS at 13:25

## 2019-01-31 ENCOUNTER — TELEPHONE (OUTPATIENT)
Dept: FAMILY MEDICINE | Facility: CLINIC | Age: 84
End: 2019-01-31

## 2019-01-31 NOTE — TELEPHONE ENCOUNTER
Received 2 page fax for Confirmation of Verbal Order for Home Medical Equipment for Dr Fajardo to complete.  Form at [facility].

## 2019-02-01 ENCOUNTER — MEDICAL CORRESPONDENCE (OUTPATIENT)
Dept: HEALTH INFORMATION MANAGEMENT | Facility: CLINIC | Age: 84
End: 2019-02-01

## 2019-07-29 DIAGNOSIS — I65.22 STENOSIS OF LEFT CAROTID ARTERY: ICD-10-CM

## 2019-07-29 DIAGNOSIS — E78.5 HYPERLIPIDEMIA LDL GOAL <100: ICD-10-CM

## 2019-07-29 DIAGNOSIS — G47.33 OSA (OBSTRUCTIVE SLEEP APNEA): ICD-10-CM

## 2019-07-29 DIAGNOSIS — I10 HYPERTENSION GOAL BP (BLOOD PRESSURE) < 140/90: ICD-10-CM

## 2019-07-29 DIAGNOSIS — I48.20 CHRONIC ATRIAL FIBRILLATION (H): ICD-10-CM

## 2019-07-29 DIAGNOSIS — I10 ESSENTIAL HYPERTENSION, BENIGN: ICD-10-CM

## 2019-07-29 RX ORDER — CLOPIDOGREL BISULFATE 75 MG/1
75 TABLET ORAL DAILY
Qty: 90 TABLET | Refills: 0 | Status: SHIPPED | OUTPATIENT
Start: 2019-07-29 | End: 2019-08-22

## 2019-07-29 RX ORDER — LISINOPRIL 5 MG/1
5 TABLET ORAL DAILY
Qty: 90 TABLET | Refills: 0 | Status: SHIPPED | OUTPATIENT
Start: 2019-07-29 | End: 2019-08-22

## 2019-07-29 RX ORDER — ATORVASTATIN CALCIUM 20 MG/1
20 TABLET, FILM COATED ORAL DAILY
Qty: 90 TABLET | Refills: 0 | Status: SHIPPED | OUTPATIENT
Start: 2019-07-29 | End: 2019-08-22

## 2019-08-01 DIAGNOSIS — I48.20 CHRONIC ATRIAL FIBRILLATION (H): Primary | ICD-10-CM

## 2019-08-01 RX ORDER — CLOPIDOGREL BISULFATE 75 MG/1
75 TABLET ORAL DAILY
Qty: 30 TABLET | Refills: 0 | Status: SHIPPED | OUTPATIENT
Start: 2019-08-01 | End: 2019-08-22

## 2019-08-01 NOTE — TELEPHONE ENCOUNTER
Pt called, says his mail order sent 7/29/19 will not get to him soon enough before he runs out of plavix. Requesting a Rx sent to local pharmacy to cover him.   Sent 30 day supply of plavix to [facility], no refills.   Stefan RN, BSN  08/01/19 9:40 AM

## 2019-08-12 ENCOUNTER — HOSPITAL ENCOUNTER (OUTPATIENT)
Dept: CARDIOLOGY | Facility: CLINIC | Age: 84
Discharge: HOME | End: 2019-08-12
Attending: INTERNAL MEDICINE | Admitting: INTERNAL MEDICINE
Payer: COMMERCIAL

## 2019-08-12 DIAGNOSIS — I48.20 CHRONIC ATRIAL FIBRILLATION (H): ICD-10-CM

## 2019-08-12 DIAGNOSIS — E78.5 HYPERLIPIDEMIA LDL GOAL <100: ICD-10-CM

## 2019-08-12 DIAGNOSIS — I10 ESSENTIAL HYPERTENSION WITH GOAL BLOOD PRESSURE LESS THAN 140/90: ICD-10-CM

## 2019-08-12 DIAGNOSIS — I65.22 STENOSIS OF LEFT CAROTID ARTERY: ICD-10-CM

## 2019-08-12 DIAGNOSIS — G47.33 OSA (OBSTRUCTIVE SLEEP APNEA): ICD-10-CM

## 2019-08-12 DIAGNOSIS — I10 HYPERTENSION GOAL BP (BLOOD PRESSURE) < 140/90: ICD-10-CM

## 2019-08-12 DIAGNOSIS — S06.5XAA SUBDURAL HEMATOMA (H): ICD-10-CM

## 2019-08-12 DIAGNOSIS — I10 ESSENTIAL HYPERTENSION, BENIGN: ICD-10-CM

## 2019-08-12 DIAGNOSIS — I71.20 THORACIC AORTIC ANEURYSM WITHOUT RUPTURE (H): ICD-10-CM

## 2019-08-12 DIAGNOSIS — E66.01 MORBID OBESITY DUE TO EXCESS CALORIES (H): ICD-10-CM

## 2019-08-12 PROCEDURE — 25500064 ZZH RX 255 OP 636: Performed by: INTERNAL MEDICINE

## 2019-08-12 PROCEDURE — 93306 TTE W/DOPPLER COMPLETE: CPT | Mod: 26 | Performed by: INTERNAL MEDICINE

## 2019-08-12 PROCEDURE — 40000264 ECHOCARDIOGRAM COMPLETE

## 2019-08-12 RX ADMIN — HUMAN ALBUMIN MICROSPHERES AND PERFLUTREN 3 ML: 10; .22 INJECTION, SOLUTION INTRAVENOUS at 10:11

## 2019-08-20 DIAGNOSIS — E78.5 HYPERLIPIDEMIA LDL GOAL <100: Primary | ICD-10-CM

## 2019-08-21 DIAGNOSIS — E78.5 HYPERLIPIDEMIA LDL GOAL <100: ICD-10-CM

## 2019-08-21 LAB
ALT SERPL W P-5'-P-CCNC: 23 U/L (ref 0–70)
CHOLEST SERPL-MCNC: 113 MG/DL
HDLC SERPL-MCNC: 33 MG/DL
LDLC SERPL CALC-MCNC: 42 MG/DL
NONHDLC SERPL-MCNC: 80 MG/DL
TRIGL SERPL-MCNC: 191 MG/DL

## 2019-08-21 PROCEDURE — 36415 COLL VENOUS BLD VENIPUNCTURE: CPT | Performed by: INTERNAL MEDICINE

## 2019-08-21 PROCEDURE — 80061 LIPID PANEL: CPT | Performed by: INTERNAL MEDICINE

## 2019-08-21 PROCEDURE — 84460 ALANINE AMINO (ALT) (SGPT): CPT | Performed by: INTERNAL MEDICINE

## 2019-08-21 NOTE — PROGRESS NOTES
CARDIOLOGY VISIT    REASON FOR VISIT: afib    SUBJECTIVE:  84-year-old male seen for follow-up of chronic atrial fibrillation.  He also has hypertension, TIA, carotid artery disease (left ICA stent in 2007), and sleep apnea.     He was diagnosed with A. fib in 2013 at the time of colonoscopy.  He has been on Xarelto 15mg daily (lower dose) and also because of his carotid disease, clopidogrel daily.     Echo August 2019 (afib) showed EF 60%, mild RV dilation with normal function, 1+ TR, RVSP 31 mmHg, sinus of Valsalva 3.8 cm, ascending aorta 4.2 cm.      Overall he has been feeling about the same.  He has neuropathy which limits his walking and activity.  He can walk at a slow pace.  With longer distances he will have some mild dyspnea, but no chest pain.  Blood pressure runs 120/75 on average, he does not remember his heart rate at home.  He denies any palpitations, lightheadedness, or syncope.    MEDICATIONS:  Current Outpatient Medications   Medication     amoxicillin-clavulanate (AUGMENTIN) 875-125 MG per tablet     atenolol (TENORMIN) 50 MG tablet     atorvastatin (LIPITOR) 20 MG tablet     B-12 OR     CENTRUM OR     clopidogrel (PLAVIX) 75 MG tablet     clopidogrel (PLAVIX) 75 MG tablet     DULoxetine (CYMBALTA) 60 MG EC capsule     furosemide (LASIX) 20 MG tablet     lisinopril (PRINIVIL/ZESTRIL) 5 MG tablet     omega 3 1000 MG CAPS     rivaroxaban ANTICOAGULANT (XARELTO) 15 MG TABS tablet     No current facility-administered medications for this visit.        ALLERGIES:  Allergies   Allergen Reactions     Epinephrine Shortness Of Breath     Very rapid heartrate     Dabigatran Itching       REVIEW OF SYSTEMS:  Constitutional:  No weight loss, fever, chills, weakness or fatigue.  HEENT:  Eyes:  No visual loss, blurred vision, double vision or yellow sclerae. No hearing loss, sneezing, congestion, runny nose or sore throat.  Skin:  No rash or itching.  Cardiovascular: per HPI  Respiratory: per HPI  GI:  No  "anorexia, nausea, vomiting or diarrhea. No abdominal pain or blood.  :  No dysurea, hematuria  Neurologic:  No headache, dizziness, syncope, paralysis, ataxia, numbness or tingling in the extremities. No change in bowel or bladder control.  Musculoskeletal:  No muscle, back pain, joint pain or stiffness.  Hematologic:  No anemia, bleeding or bruising.  Lymphatics:  No enlarged nodes. No history of splenectomy.  Psychiatric:  No history of depression or anxiety.  Endocrine:  No reports of sweating, cold or heat intolerance. No polyuria or polydipsia.  Allergies:  No history of asthma, hives, eczema or rhinitis.    PHYSICAL EXAM:  /64 (BP Location: Right arm, Patient Position: Sitting, Cuff Size: Adult Large)   Pulse 103   Ht 1.816 m (5' 11.5\")   Wt 116.6 kg (257 lb)   SpO2 98%   BMI 35.34 kg/m      Constitutional: awake, alert, no distress  Eyes: PERRL, sclera nonicteric  ENT: trachea midline  Respiratory: Lungs clear  Cardiovascular: Regular rate, totally irregular rhythm, no lower extremity edema  GI: nondistended, nontender, bowel sounds present  Lymph/Hematologic: no lymphadenopathy  Skin: dry, no rash  Musculoskeletal: good muscle tone, strength 5/5 in upper and lower extremities  Neurologic: no focal deficits  Neuropsychiatric: appropriate affact    DATA:  Lab:   Recent Labs   Lab Test 06/20/17  1011 07/11/16  1110 06/29/15  1142 05/13/14  1015   CHOL 112 114 101 112   HDL 34* 33* 38* 26*   LDL 50 49 35 56   TRIG 139 162* 138 152*   CHOLHDLRATIO  --   --  2.7 4.3       EKG: August 22, 2019: Atrial fibrillation, right bundle branch block, rate 78    ASSESSMENT:  84-year-old male seen for follow-up of atrial fibrillation.  His cardiac issues are stable.  He has no symptoms from his A. fib, it seems like heart rates are well controlled.  He has no bleeding issues on the Xarelto.  Aorta is stable at the 4.2 cm range.    RECOMMENDATIONS:  1.  Chronic atrial fibrillation  -Continue current medications " including Xarelto    2.  Dilated ascending aorta  -Repeat echo in 2021    3.  Remote history of carotid stenting  -Repeat carotid ultrasound in 1 or 2 years if this is not ordered by primary care, continue clopidogrel    Follow-up in 1 year.    Ventura Shields MD  Cardiology - [facility]  Pager:  [phone]  Text Page  August 21, 2019

## 2019-08-22 ENCOUNTER — OFFICE VISIT (OUTPATIENT)
Dept: CARDIOLOGY | Facility: CLINIC | Age: 84
End: 2019-08-22
Payer: COMMERCIAL

## 2019-08-22 VITALS
WEIGHT: 257 LBS | HEART RATE: 103 BPM | BODY MASS INDEX: 34.81 KG/M2 | SYSTOLIC BLOOD PRESSURE: 102 MMHG | DIASTOLIC BLOOD PRESSURE: 64 MMHG | HEIGHT: 72 IN | OXYGEN SATURATION: 98 %

## 2019-08-22 DIAGNOSIS — I10 ESSENTIAL HYPERTENSION, BENIGN: ICD-10-CM

## 2019-08-22 DIAGNOSIS — G47.33 OSA (OBSTRUCTIVE SLEEP APNEA): ICD-10-CM

## 2019-08-22 DIAGNOSIS — I10 HYPERTENSION GOAL BP (BLOOD PRESSURE) < 140/90: ICD-10-CM

## 2019-08-22 DIAGNOSIS — I10 ESSENTIAL HYPERTENSION WITH GOAL BLOOD PRESSURE LESS THAN 140/90: ICD-10-CM

## 2019-08-22 DIAGNOSIS — E78.5 HYPERLIPIDEMIA LDL GOAL <100: ICD-10-CM

## 2019-08-22 DIAGNOSIS — I65.22 STENOSIS OF LEFT CAROTID ARTERY: ICD-10-CM

## 2019-08-22 DIAGNOSIS — I48.20 CHRONIC ATRIAL FIBRILLATION (H): ICD-10-CM

## 2019-08-22 DIAGNOSIS — I48.20 CHRONIC A-FIB (H): Primary | ICD-10-CM

## 2019-08-22 PROCEDURE — 93000 ELECTROCARDIOGRAM COMPLETE: CPT | Performed by: INTERNAL MEDICINE

## 2019-08-22 PROCEDURE — 99214 OFFICE O/P EST MOD 30 MIN: CPT | Performed by: INTERNAL MEDICINE

## 2019-08-22 RX ORDER — CLOPIDOGREL BISULFATE 75 MG/1
75 TABLET ORAL DAILY
Qty: 90 TABLET | Refills: 3 | Status: SHIPPED | OUTPATIENT
Start: 2019-08-22 | End: 2020-09-24

## 2019-08-22 RX ORDER — FUROSEMIDE 20 MG
20 TABLET ORAL DAILY
Qty: 90 TABLET | Refills: 3 | Status: SHIPPED | OUTPATIENT
Start: 2019-08-22 | End: 2020-11-04

## 2019-08-22 RX ORDER — LISINOPRIL 5 MG/1
5 TABLET ORAL DAILY
Qty: 90 TABLET | Refills: 3 | Status: SHIPPED | OUTPATIENT
Start: 2019-08-22 | End: 2020-09-24

## 2019-08-22 RX ORDER — ATENOLOL 50 MG/1
TABLET ORAL
Qty: 135 TABLET | Refills: 3 | Status: SHIPPED | OUTPATIENT
Start: 2019-08-22 | End: 2020-09-24

## 2019-08-22 RX ORDER — ATORVASTATIN CALCIUM 20 MG/1
20 TABLET, FILM COATED ORAL DAILY
Qty: 90 TABLET | Refills: 3 | Status: SHIPPED | OUTPATIENT
Start: 2019-08-22 | End: 2020-09-24

## 2019-08-22 ASSESSMENT — MIFFLIN-ST. JEOR: SCORE: 1890.8

## 2019-08-22 NOTE — LETTER
8/22/2019    James Fajardo MD  [address]  [facility] [address]    RE: Issac MARLOW Garcia       Dear Colleague,    I had the pleasure of seeing Issac Zelaya in the [facility].    CARDIOLOGY VISIT    REASON FOR VISIT: afib    SUBJECTIVE:  84-year-old male seen for follow-up of chronic atrial fibrillation.  He also has hypertension, TIA, carotid artery disease (left ICA stent in 2007), and sleep apnea.     He was diagnosed with A. fib in 2013 at the time of colonoscopy.  He has been on Xarelto 15mg daily (lower dose) and also because of his carotid disease, clopidogrel daily.     Echo August 2019 (afib) showed EF 60%, mild RV dilation with normal function, 1+ TR, RVSP 31 mmHg, sinus of Valsalva 3.8 cm, ascending aorta 4.2 cm.      Overall he has been feeling about the same.  He has neuropathy which limits his walking and activity.  He can walk at a slow pace.  With longer distances he will have some mild dyspnea, but no chest pain.  Blood pressure runs 120/75 on average, he does not remember his heart rate at home.  He denies any palpitations, lightheadedness, or syncope.    MEDICATIONS:  Current Outpatient Medications   Medication     amoxicillin-clavulanate (AUGMENTIN) 875-125 MG per tablet     atenolol (TENORMIN) 50 MG tablet     atorvastatin (LIPITOR) 20 MG tablet     B-12 OR     CENTRUM OR     clopidogrel (PLAVIX) 75 MG tablet     clopidogrel (PLAVIX) 75 MG tablet     DULoxetine (CYMBALTA) 60 MG EC capsule     furosemide (LASIX) 20 MG tablet     lisinopril (PRINIVIL/ZESTRIL) 5 MG tablet     omega 3 1000 MG CAPS     rivaroxaban ANTICOAGULANT (XARELTO) 15 MG TABS tablet     No current facility-administered medications for this visit.        ALLERGIES:  Allergies   Allergen Reactions     Epinephrine Shortness Of Breath     Very rapid heartrate     Dabigatran Itching       REVIEW OF SYSTEMS:  Constitutional:  No weight loss, fever, chills, weakness or fatigue.  HEENT:   "Eyes:  No visual loss, blurred vision, double vision or yellow sclerae. No hearing loss, sneezing, congestion, runny nose or sore throat.  Skin:  No rash or itching.  Cardiovascular: per HPI  Respiratory: per HPI  GI:  No anorexia, nausea, vomiting or diarrhea. No abdominal pain or blood.  :  No dysurea, hematuria  Neurologic:  No headache, dizziness, syncope, paralysis, ataxia, numbness or tingling in the extremities. No change in bowel or bladder control.  Musculoskeletal:  No muscle, back pain, joint pain or stiffness.  Hematologic:  No anemia, bleeding or bruising.  Lymphatics:  No enlarged nodes. No history of splenectomy.  Psychiatric:  No history of depression or anxiety.  Endocrine:  No reports of sweating, cold or heat intolerance. No polyuria or polydipsia.  Allergies:  No history of asthma, hives, eczema or rhinitis.    PHYSICAL EXAM:  /64 (BP Location: Right arm, Patient Position: Sitting, Cuff Size: Adult Large)   Pulse 103   Ht 1.816 m (5' 11.5\")   Wt 116.6 kg (257 lb)   SpO2 98%   BMI 35.34 kg/m       Constitutional: awake, alert, no distress  Eyes: PERRL, sclera nonicteric  ENT: trachea midline  Respiratory: Lungs clear  Cardiovascular: Regular rate, totally irregular rhythm, no lower extremity edema  GI: nondistended, nontender, bowel sounds present  Lymph/Hematologic: no lymphadenopathy  Skin: dry, no rash  Musculoskeletal: good muscle tone, strength 5/5 in upper and lower extremities  Neurologic: no focal deficits  Neuropsychiatric: appropriate affact    DATA:  Lab:   Recent Labs   Lab Test 06/20/17  1011 07/11/16  1110 06/29/15  1142 05/13/14  1015   CHOL 112 114 101 112   HDL 34* 33* 38* 26*   LDL 50 49 35 56   TRIG 139 162* 138 152*   CHOLHDLRATIO  --   --  2.7 4.3       EKG: August 22, 2019: Atrial fibrillation, right bundle branch block, rate 78    ASSESSMENT:  84-year-old male seen for follow-up of atrial fibrillation.  His cardiac issues are stable.  He has no symptoms from his " A. fib, it seems like heart rates are well controlled.  He has no bleeding issues on the Xarelto.  Aorta is stable at the 4.2 cm range.    RECOMMENDATIONS:  1.  Chronic atrial fibrillation  -Continue current medications including Xarelto    2.  Dilated ascending aorta  -Repeat echo in 2021    3.  Remote history of carotid stenting  -Repeat carotid ultrasound in 1 or 2 years if this is not ordered by primary care, continue clopidogrel    Follow-up in 1 year.    Ventura Shields MD  Cardiology - [facility]  Pager:  [phone]  Text Page  August 21, 2019      Thank you for allowing me to participate in the care of your patient.      Sincerely,     Ventura Shields MD     [facility]

## 2019-09-24 ENCOUNTER — ALLIED HEALTH/NURSE VISIT (OUTPATIENT)
Dept: NURSING | Facility: CLINIC | Age: 84
End: 2019-09-24
Payer: COMMERCIAL

## 2019-09-24 DIAGNOSIS — Z23 NEED FOR PROPHYLACTIC VACCINATION AND INOCULATION AGAINST INFLUENZA: Primary | ICD-10-CM

## 2019-09-24 PROCEDURE — 90662 IIV NO PRSV INCREASED AG IM: CPT

## 2019-09-24 PROCEDURE — 99207 ZZC NO CHARGE NURSE ONLY: CPT

## 2019-09-24 PROCEDURE — G0008 ADMIN INFLUENZA VIRUS VAC: HCPCS

## 2019-11-21 ENCOUNTER — TELEPHONE (OUTPATIENT)
Dept: FAMILY MEDICINE | Facility: CLINIC | Age: 84
End: 2019-11-21

## 2019-11-21 DIAGNOSIS — G47.33 OBSTRUCTIVE SLEEP APNEA SYNDROME: Primary | ICD-10-CM

## 2019-11-21 NOTE — TELEPHONE ENCOUNTER
Dr. Fajardo,  Please see below.  Pended possible order.  Please authorize if appropriate.  Please route back bad we can hand fax to number below.  Thanks,  Ita High RN

## 2019-11-21 NOTE — TELEPHONE ENCOUNTER
Patient came to , has sleep apnea, used to get supplies from sleep clinic. Patient is requesting prescription for CPAP supplies from PCP Dr. Fajardo. Prescription needs to be written with Diagnosis code G47.33  And faxed to [facility] in [place] Fax # [phone].  Call patient after sent or with questions at [phone]      Rosa Maria Wong-Patient Rep

## 2019-11-27 ENCOUNTER — TELEPHONE (OUTPATIENT)
Dept: FAMILY MEDICINE | Facility: CLINIC | Age: 84
End: 2019-11-27

## 2019-11-27 NOTE — TELEPHONE ENCOUNTER
Received fax from [employer] stating they need new face to face for recent rx written on 11/21/19. Due for apt per .  Called and spoke with patient-patient states his supplies have been ordered and was told they would be delivered by Friday. Informed patient OV still recommended as it has been over 1 year. Patient states he will call back to schedule apt. Will hold onto form to complete at that office visit.

## 2020-09-15 ENCOUNTER — ALLIED HEALTH/NURSE VISIT (OUTPATIENT)
Dept: FAMILY MEDICINE | Facility: CLINIC | Age: 85
End: 2020-09-15
Payer: COMMERCIAL

## 2020-09-15 DIAGNOSIS — Z23 NEED FOR PROPHYLACTIC VACCINATION AND INOCULATION AGAINST INFLUENZA: Primary | ICD-10-CM

## 2020-09-15 PROCEDURE — 99207 ZZC NO CHARGE NURSE ONLY: CPT

## 2020-09-15 PROCEDURE — G0008 ADMIN INFLUENZA VIRUS VAC: HCPCS

## 2020-09-15 PROCEDURE — 90662 IIV NO PRSV INCREASED AG IM: CPT

## 2020-09-24 DIAGNOSIS — G47.33 OSA (OBSTRUCTIVE SLEEP APNEA): ICD-10-CM

## 2020-09-24 DIAGNOSIS — I10 ESSENTIAL HYPERTENSION WITH GOAL BLOOD PRESSURE LESS THAN 140/90: ICD-10-CM

## 2020-09-24 DIAGNOSIS — I65.22 STENOSIS OF LEFT CAROTID ARTERY: ICD-10-CM

## 2020-09-24 DIAGNOSIS — I48.20 CHRONIC ATRIAL FIBRILLATION (H): ICD-10-CM

## 2020-09-24 DIAGNOSIS — I10 ESSENTIAL HYPERTENSION, BENIGN: ICD-10-CM

## 2020-09-24 DIAGNOSIS — I10 HYPERTENSION GOAL BP (BLOOD PRESSURE) < 140/90: ICD-10-CM

## 2020-09-24 DIAGNOSIS — E78.5 HYPERLIPIDEMIA LDL GOAL <100: ICD-10-CM

## 2020-09-24 RX ORDER — ATORVASTATIN CALCIUM 20 MG/1
20 TABLET, FILM COATED ORAL DAILY
Qty: 90 TABLET | Refills: 0 | Status: SHIPPED | OUTPATIENT
Start: 2020-09-24 | End: 2020-11-04

## 2020-09-24 RX ORDER — CLOPIDOGREL BISULFATE 75 MG/1
75 TABLET ORAL DAILY
Qty: 90 TABLET | Refills: 0 | Status: SHIPPED | OUTPATIENT
Start: 2020-09-24 | End: 2020-11-04

## 2020-09-24 RX ORDER — LISINOPRIL 5 MG/1
5 TABLET ORAL DAILY
Qty: 90 TABLET | Refills: 0 | Status: SHIPPED | OUTPATIENT
Start: 2020-09-24 | End: 2020-11-04

## 2020-09-24 RX ORDER — ATENOLOL 50 MG/1
TABLET ORAL
Qty: 90 TABLET | Refills: 0 | Status: SHIPPED | OUTPATIENT
Start: 2020-09-24 | End: 2020-11-04

## 2020-09-24 NOTE — TELEPHONE ENCOUNTER
Received refill request for: lisinopril, Xarelto, atenolol, atorvastatin, clopidogrel   Last OV was: 8/22/2019 with Dr. Shields  Labs/EKG: labs scheduled for 10/13/2020 at [facility]  F/U scheduled: 11/4/20 with Dr. Shields  New script sent to: [facility]

## 2020-10-13 DIAGNOSIS — I65.23 ASYMPTOMATIC BILATERAL CAROTID ARTERY STENOSIS: ICD-10-CM

## 2020-10-13 LAB
ALBUMIN SERPL-MCNC: 3.5 G/DL (ref 3.4–5)
ALP SERPL-CCNC: 50 U/L (ref 40–150)
ALT SERPL W P-5'-P-CCNC: 20 U/L (ref 0–70)
ANION GAP SERPL CALCULATED.3IONS-SCNC: 6 MMOL/L (ref 3–14)
AST SERPL W P-5'-P-CCNC: 18 U/L (ref 0–45)
BASOPHILS # BLD AUTO: 0 10E9/L (ref 0–0.2)
BASOPHILS NFR BLD AUTO: 0.5 %
BILIRUB SERPL-MCNC: 0.8 MG/DL (ref 0.2–1.3)
BUN SERPL-MCNC: 15 MG/DL (ref 7–30)
CALCIUM SERPL-MCNC: 9.5 MG/DL (ref 8.5–10.1)
CHLORIDE SERPL-SCNC: 106 MMOL/L (ref 94–109)
CHOLEST SERPL-MCNC: 107 MG/DL
CO2 SERPL-SCNC: 25 MMOL/L (ref 20–32)
CREAT SERPL-MCNC: 1.16 MG/DL (ref 0.66–1.25)
DIFFERENTIAL METHOD BLD: ABNORMAL
EOSINOPHIL # BLD AUTO: 0.3 10E9/L (ref 0–0.7)
EOSINOPHIL NFR BLD AUTO: 4.1 %
ERYTHROCYTE [DISTWIDTH] IN BLOOD BY AUTOMATED COUNT: 13.1 % (ref 10–15)
GFR SERPL CREATININE-BSD FRML MDRD: 57 ML/MIN/{1.73_M2}
GLUCOSE SERPL-MCNC: 110 MG/DL (ref 70–99)
HCT VFR BLD AUTO: 41 % (ref 40–53)
HDLC SERPL-MCNC: 36 MG/DL
HGB BLD-MCNC: 13.9 G/DL (ref 13.3–17.7)
LDLC SERPL CALC-MCNC: 37 MG/DL
LYMPHOCYTES # BLD AUTO: 1.6 10E9/L (ref 0.8–5.3)
LYMPHOCYTES NFR BLD AUTO: 20.6 %
MCH RBC QN AUTO: 33.9 PG (ref 26.5–33)
MCHC RBC AUTO-ENTMCNC: 33.9 G/DL (ref 31.5–36.5)
MCV RBC AUTO: 100 FL (ref 78–100)
MONOCYTES # BLD AUTO: 0.7 10E9/L (ref 0–1.3)
MONOCYTES NFR BLD AUTO: 9.1 %
NEUTROPHILS # BLD AUTO: 5.2 10E9/L (ref 1.6–8.3)
NEUTROPHILS NFR BLD AUTO: 65.7 %
NONHDLC SERPL-MCNC: 71 MG/DL
PLATELET # BLD AUTO: 163 10E9/L (ref 150–450)
POTASSIUM SERPL-SCNC: 4.3 MMOL/L (ref 3.4–5.3)
PROT SERPL-MCNC: 7.3 G/DL (ref 6.8–8.8)
RBC # BLD AUTO: 4.1 10E12/L (ref 4.4–5.9)
SODIUM SERPL-SCNC: 137 MMOL/L (ref 133–144)
TRIGL SERPL-MCNC: 168 MG/DL
WBC # BLD AUTO: 7.8 10E9/L (ref 4–11)

## 2020-10-13 PROCEDURE — 80053 COMPREHEN METABOLIC PANEL: CPT | Performed by: FAMILY MEDICINE

## 2020-10-13 PROCEDURE — 80061 LIPID PANEL: CPT | Performed by: FAMILY MEDICINE

## 2020-10-13 PROCEDURE — 85025 COMPLETE CBC W/AUTO DIFF WBC: CPT | Performed by: FAMILY MEDICINE

## 2020-10-29 NOTE — PROGRESS NOTES
"Pre-Visit Planning   Next 5 appointments (look out 90 days)    Nov 04, 2020  8:15 AM  Return Visit with Ventura Shields MD  [facility] ([facility]) [address] [address] [address] [phone]        Appointment Notes for this encounter:   Wellness/med refills    Questionnaires Reviewed/Assigned  Additional questionnaires assigned      Patient preferred phone number: [phone]      Spoke to patient via phone. Patient does not have additional questions or concerns.        Visit is preventive. Reviewed purpose of preventive visit with patient.    Patient is established.    Patient reminded of date and time. Barriers addressed: none  Chief complaint confirmed.  Clarified visit purpose: annual wellness, renewal on all medications due   Checked for changes of symptoms or new symptoms: neuropathy seems to be the same as last seen, no issues to discuss     Health Maintenance Due   Topic Date Due     ANNUAL REVIEW OF HM ORDERS  1935     ADVANCE CARE PLANNING  1935     ZOSTER IMMUNIZATION (1 of 2) 01/21/1986     MEDICARE ANNUAL WELLNESS VISIT  08/30/2013     FALL RISK ASSESSMENT  09/18/2019     DTAP/TDAP/TD IMMUNIZATION (2 - Td) 11/10/2019     PHQ-2  01/01/2020     MyChart  Patient is active on [employer].    Questionnaire Review   Advised patient to arrive early in order to complete questionnaires.    Call Summary  \"Thank you for your time today.  If anything comes up before your appointment, please feel free to contact us at [phone].\"  "

## 2020-11-02 NOTE — PROGRESS NOTES
CARDIOLOGY VISIT    REASON FOR VISIT: afib    SUBJECTIVE:  85-year-old male seen for follow-up of chronic atrial fibrillation.  He also has hypertension, TIA, carotid artery disease (left ICA stent in 2007), and sleep apnea.      He was diagnosed with A. fib in 2013 at the time of colonoscopy.  He has been on Xarelto 15mg daily (lower dose) and also because of his carotid disease, clopidogrel daily.      Echo August 2019 (afib) showed EF 60%, mild RV dilation with normal function, 1+ TR, RVSP 31 mmHg, sinus of Valsalva 3.8 cm, ascending aorta 4.2 cm.       Overall he has been feeling about the same.  His main complaint is neuropathy which limits his walking somewhat.  He denies any chest pain or dyspnea.  He has some intermittent mild lower extremity edema which is unchanged.    MEDICATIONS:  Current Outpatient Medications   Medication     atenolol (TENORMIN) 50 MG tablet     atorvastatin (LIPITOR) 20 MG tablet     B-12 OR     CENTRUM OR     clopidogrel (PLAVIX) 75 MG tablet     DULoxetine (CYMBALTA) 60 MG EC capsule     furosemide (LASIX) 20 MG tablet     lisinopril (ZESTRIL) 5 MG tablet     omega 3 1000 MG CAPS     rivaroxaban ANTICOAGULANT (XARELTO) 15 MG TABS tablet     No current facility-administered medications for this visit.        ALLERGIES:  Allergies   Allergen Reactions     Epinephrine Shortness Of Breath     Very rapid heartrate     Dabigatran Itching       REVIEW OF SYSTEMS:  Constitutional:  No weight loss, fever, chills, weakness or fatigue.  HEENT:  Eyes:  No visual loss, blurred vision, double vision or yellow sclerae. No hearing loss, sneezing, congestion, runny nose or sore throat.  Skin:  No rash or itching.  Cardiovascular: per HPI  Respiratory: per HPI  GI:  No anorexia, nausea, vomiting or diarrhea. No abdominal pain or blood.  :  No dysurea, hematuria  Neurologic:  No headache, dizziness, syncope, paralysis, ataxia, numbness or tingling in the extremities. No change in bowel or bladder  "control.  Musculoskeletal:  No muscle, back pain, joint pain or stiffness.  Hematologic:  No anemia, bleeding or bruising.  Lymphatics:  No enlarged nodes. No history of splenectomy.  Psychiatric:  No history of depression or anxiety.  Endocrine:  No reports of sweating, cold or heat intolerance. No polyuria or polydipsia.  Allergies:  No history of asthma, hives, eczema or rhinitis.    PHYSICAL EXAM:  /76 (BP Location: Right arm, Patient Position: Sitting, Cuff Size: Adult Large)   Pulse 63   Ht 1.816 m (5' 11.5\")   Wt 112.9 kg (249 lb)   SpO2 96%   BMI 34.24 kg/m      Constitutional: awake, alert, no distress  Eyes: PERRL, sclera nonicteric  ENT: trachea midline  Respiratory: Lungs clear  Cardiovascular: Regular rate, totally irregular rhythm, no murmurs, trace edema of the lower shins  GI: nondistended, nontender, bowel sounds present  Lymph/Hematologic: no lymphadenopathy  Skin: dry, no rash  Musculoskeletal: good muscle tone, strength 5/5 in upper and lower extremities  Neurologic: no focal deficits  Neuropsychiatric: appropriate affact    DATA:  Lab: October 2020: Potassium 4.3, creatinine 1.1, hemoglobin 13.9   Recent Labs   Lab Test 10/13/20  0920 08/21/19  0901 06/29/15  1142 06/29/15  1142 05/13/14  1015   CHOL 107 113   < > 101 112   HDL 36* 33*   < > 38* 26*   LDL 37 42   < > 35 56   TRIG 168* 191*   < > 138 152*   CHOLHDLRATIO  --   --   --  2.7 4.3    < > = values in this interval not displayed.       ASSESSMENT:  85-year-old male seen for follow-up of chronic atrial fibrillation.  Overall he is doing well and has no concerning cardiac symptoms.  A. fib seems well controlled.  He does have neuropathy and some symptoms in his hands.  Amyloid is a possibility, this was evaluated in 2015.  He does not have any overt heart failure.  We talked about possibly doing an amyloid work-up, however there is no treatment at this time, only some very expensive medications that may reduce symptoms a " brandee.  Carotid ultrasound will be done to follow-up on the remote carotid stent.    RECOMMENDATIONS:  1.  Chronic atrial fibrillation  -Continue rate control and Xarelto    2.  History left carotid stent  -Bilateral carotid ultrasound  - continue clopidogrel    3.  Mild dilation of ascending aorta  -Repeat echo in 1 to 2 years    Follow-up in 1 year with labs.    Ventura Shields MD  Cardiology - [facility]  Pager:  [phone]  Text Page  November 4, 2020

## 2020-11-04 ENCOUNTER — OFFICE VISIT (OUTPATIENT)
Dept: CARDIOLOGY | Facility: CLINIC | Age: 85
End: 2020-11-04
Payer: COMMERCIAL

## 2020-11-04 VITALS
HEART RATE: 63 BPM | OXYGEN SATURATION: 96 % | WEIGHT: 249 LBS | HEIGHT: 72 IN | BODY MASS INDEX: 33.72 KG/M2 | DIASTOLIC BLOOD PRESSURE: 76 MMHG | SYSTOLIC BLOOD PRESSURE: 118 MMHG

## 2020-11-04 DIAGNOSIS — G47.33 OSA (OBSTRUCTIVE SLEEP APNEA): ICD-10-CM

## 2020-11-04 DIAGNOSIS — I48.21 PERMANENT ATRIAL FIBRILLATION (H): ICD-10-CM

## 2020-11-04 DIAGNOSIS — E78.5 DYSLIPIDEMIA: ICD-10-CM

## 2020-11-04 DIAGNOSIS — I10 ESSENTIAL HYPERTENSION WITH GOAL BLOOD PRESSURE LESS THAN 140/90: ICD-10-CM

## 2020-11-04 DIAGNOSIS — I65.22 STENOSIS OF LEFT CAROTID ARTERY: ICD-10-CM

## 2020-11-04 DIAGNOSIS — E78.5 HYPERLIPIDEMIA LDL GOAL <100: ICD-10-CM

## 2020-11-04 DIAGNOSIS — I48.20 CHRONIC ATRIAL FIBRILLATION (H): ICD-10-CM

## 2020-11-04 DIAGNOSIS — I65.23 BILATERAL CAROTID ARTERY STENOSIS: Primary | ICD-10-CM

## 2020-11-04 DIAGNOSIS — I10 ESSENTIAL HYPERTENSION, BENIGN: ICD-10-CM

## 2020-11-04 DIAGNOSIS — I10 HYPERTENSION GOAL BP (BLOOD PRESSURE) < 140/90: ICD-10-CM

## 2020-11-04 PROCEDURE — 99214 OFFICE O/P EST MOD 30 MIN: CPT | Performed by: INTERNAL MEDICINE

## 2020-11-04 RX ORDER — CLOPIDOGREL BISULFATE 75 MG/1
75 TABLET ORAL DAILY
Qty: 90 TABLET | Refills: 3 | Status: SHIPPED | OUTPATIENT
Start: 2020-11-04 | End: 2021-11-24

## 2020-11-04 RX ORDER — ATORVASTATIN CALCIUM 20 MG/1
20 TABLET, FILM COATED ORAL DAILY
Qty: 90 TABLET | Refills: 3 | Status: SHIPPED | OUTPATIENT
Start: 2020-11-04 | End: 2021-11-24

## 2020-11-04 RX ORDER — LISINOPRIL 5 MG/1
5 TABLET ORAL DAILY
Qty: 90 TABLET | Refills: 3 | Status: SHIPPED | OUTPATIENT
Start: 2020-11-04 | End: 2021-11-24

## 2020-11-04 RX ORDER — ATENOLOL 50 MG/1
TABLET ORAL
Qty: 90 TABLET | Refills: 3 | Status: SHIPPED | OUTPATIENT
Start: 2020-11-04 | End: 2021-11-24

## 2020-11-04 RX ORDER — FUROSEMIDE 20 MG
20 TABLET ORAL DAILY
Qty: 90 TABLET | Refills: 3 | Status: SHIPPED | OUTPATIENT
Start: 2020-11-04 | End: 2021-11-24

## 2020-11-04 ASSESSMENT — MIFFLIN-ST. JEOR: SCORE: 1849.52

## 2020-11-04 NOTE — LETTER
11/4/2020    James Fajardo MD  [address]  [facility] [address]    RE: Issac MARLOW Garcia       Dear Colleague,    I had the pleasure of seeing Issac Zelaya in the [facility].    CARDIOLOGY VISIT    REASON FOR VISIT: afib    SUBJECTIVE:  85-year-old male seen for follow-up of chronic atrial fibrillation.  He also has hypertension, TIA, carotid artery disease (left ICA stent in 2007), and sleep apnea.      He was diagnosed with A. fib in 2013 at the time of colonoscopy.  He has been on Xarelto 15mg daily (lower dose) and also because of his carotid disease, clopidogrel daily.      Echo August 2019 (afib) showed EF 60%, mild RV dilation with normal function, 1+ TR, RVSP 31 mmHg, sinus of Valsalva 3.8 cm, ascending aorta 4.2 cm.       Overall he has been feeling about the same.  His main complaint is neuropathy which limits his walking somewhat.  He denies any chest pain or dyspnea.  He has some intermittent mild lower extremity edema which is unchanged.    MEDICATIONS:  Current Outpatient Medications   Medication     atenolol (TENORMIN) 50 MG tablet     atorvastatin (LIPITOR) 20 MG tablet     B-12 OR     CENTRUM OR     clopidogrel (PLAVIX) 75 MG tablet     DULoxetine (CYMBALTA) 60 MG EC capsule     furosemide (LASIX) 20 MG tablet     lisinopril (ZESTRIL) 5 MG tablet     omega 3 1000 MG CAPS     rivaroxaban ANTICOAGULANT (XARELTO) 15 MG TABS tablet     No current facility-administered medications for this visit.        ALLERGIES:  Allergies   Allergen Reactions     Epinephrine Shortness Of Breath     Very rapid heartrate     Dabigatran Itching       REVIEW OF SYSTEMS:  Constitutional:  No weight loss, fever, chills, weakness or fatigue.  HEENT:  Eyes:  No visual loss, blurred vision, double vision or yellow sclerae. No hearing loss, sneezing, congestion, runny nose or sore throat.  Skin:  No rash or itching.  Cardiovascular: per HPI  Respiratory: per HPI  GI:  No anorexia,  "nausea, vomiting or diarrhea. No abdominal pain or blood.  :  No dysurea, hematuria  Neurologic:  No headache, dizziness, syncope, paralysis, ataxia, numbness or tingling in the extremities. No change in bowel or bladder control.  Musculoskeletal:  No muscle, back pain, joint pain or stiffness.  Hematologic:  No anemia, bleeding or bruising.  Lymphatics:  No enlarged nodes. No history of splenectomy.  Psychiatric:  No history of depression or anxiety.  Endocrine:  No reports of sweating, cold or heat intolerance. No polyuria or polydipsia.  Allergies:  No history of asthma, hives, eczema or rhinitis.    PHYSICAL EXAM:  /76 (BP Location: Right arm, Patient Position: Sitting, Cuff Size: Adult Large)   Pulse 63   Ht 1.816 m (5' 11.5\")   Wt 112.9 kg (249 lb)   SpO2 96%   BMI 34.24 kg/m      Constitutional: awake, alert, no distress  Eyes: PERRL, sclera nonicteric  ENT: trachea midline  Respiratory: Lungs clear  Cardiovascular: Regular rate, totally irregular rhythm, no murmurs, trace edema of the lower shins  GI: nondistended, nontender, bowel sounds present  Lymph/Hematologic: no lymphadenopathy  Skin: dry, no rash  Musculoskeletal: good muscle tone, strength 5/5 in upper and lower extremities  Neurologic: no focal deficits  Neuropsychiatric: appropriate affact    DATA:  Lab: October 2020: Potassium 4.3, creatinine 1.1, hemoglobin 13.9   Recent Labs   Lab Test 10/13/20  0920 08/21/19  0901 06/29/15  1142 06/29/15  1142 05/13/14  1015   CHOL 107 113   < > 101 112   HDL 36* 33*   < > 38* 26*   LDL 37 42   < > 35 56   TRIG 168* 191*   < > 138 152*   CHOLHDLRATIO  --   --   --  2.7 4.3    < > = values in this interval not displayed.       ASSESSMENT:  85-year-old male seen for follow-up of chronic atrial fibrillation.  Overall he is doing well and has no concerning cardiac symptoms.  A. fib seems well controlled.  He does have neuropathy and some symptoms in his hands.  Amyloid is a possibility, this was " evaluated in 2015.  He does not have any overt heart failure.  We talked about possibly doing an amyloid work-up, however there is no treatment at this time, only some very expensive medications that may reduce symptoms a little.  Carotid ultrasound will be done to follow-up on the remote carotid stent.    RECOMMENDATIONS:  1.  Chronic atrial fibrillation  -Continue rate control and Xarelto    2.  History left carotid stent  -Bilateral carotid ultrasound  - continue clopidogrel    3.  Mild dilation of ascending aorta  -Repeat echo in 1 to 2 years    Follow-up in 1 year with labs.    Ventura Shields MD  Cardiology - [facility]  Pager:  [phone]  Text Page  November 4, 2020      Thank you for allowing me to participate in the care of your patient.    Sincerely,     Ventura Shields MD     [facility]

## 2020-11-05 ENCOUNTER — OFFICE VISIT (OUTPATIENT)
Dept: FAMILY MEDICINE | Facility: CLINIC | Age: 85
End: 2020-11-05
Payer: COMMERCIAL

## 2020-11-05 VITALS
BODY MASS INDEX: 33.72 KG/M2 | RESPIRATION RATE: 16 BRPM | SYSTOLIC BLOOD PRESSURE: 110 MMHG | DIASTOLIC BLOOD PRESSURE: 67 MMHG | OXYGEN SATURATION: 97 % | TEMPERATURE: 98 F | HEART RATE: 66 BPM | WEIGHT: 249 LBS | HEIGHT: 72 IN

## 2020-11-05 DIAGNOSIS — I77.810 THORACIC AORTIC ECTASIA (H): ICD-10-CM

## 2020-11-05 DIAGNOSIS — S06.5XAA SUBDURAL HEMATOMA (H): ICD-10-CM

## 2020-11-05 DIAGNOSIS — E66.01 MORBID OBESITY DUE TO EXCESS CALORIES (H): ICD-10-CM

## 2020-11-05 DIAGNOSIS — J30.1 SEASONAL ALLERGIC RHINITIS DUE TO POLLEN: ICD-10-CM

## 2020-11-05 DIAGNOSIS — Z00.00 ENCOUNTER FOR SUBSEQUENT ANNUAL WELLNESS VISIT (AWV) IN MEDICARE PATIENT: ICD-10-CM

## 2020-11-05 DIAGNOSIS — Z00.00 ENCOUNTER FOR MEDICARE ANNUAL WELLNESS EXAM: Primary | ICD-10-CM

## 2020-11-05 PROCEDURE — 99397 PER PM REEVAL EST PAT 65+ YR: CPT | Performed by: NURSE PRACTITIONER

## 2020-11-05 PROCEDURE — 99213 OFFICE O/P EST LOW 20 MIN: CPT | Mod: 25 | Performed by: NURSE PRACTITIONER

## 2020-11-05 RX ORDER — FLUTICASONE PROPIONATE 50 MCG
1 SPRAY, SUSPENSION (ML) NASAL DAILY
Qty: 16 G | Refills: 0 | Status: SHIPPED | OUTPATIENT
Start: 2020-11-05 | End: 2022-11-08

## 2020-11-05 ASSESSMENT — MIFFLIN-ST. JEOR: SCORE: 1849.52

## 2020-11-05 NOTE — PATIENT INSTRUCTIONS
Tylenol 1,000 mg two times per day for hand and foot pain.. (two extra strength tablets.)    Fluticasone nasal spray; one spray per nostril once daily.      Patient Education   Personalized Prevention Plan  You are due for the preventive services outlined below.  Your care team is available to assist you in scheduling these services.  If you have already completed any of these items, please share that information with your care team to update in your medical record.  Health Maintenance Due   Topic Date Due     ANNUAL REVIEW OF HM ORDERS  1935     Discuss Advance Care Planning  1935     Zoster (Shingles) Vaccine (1 of 2) 01/21/1986     Annual Wellness Visit  08/30/2013     FALL RISK ASSESSMENT  09/18/2019     Diptheria Tetanus Pertussis (DTAP/TDAP/TD) Vaccine (2 - Td) 11/10/2019     PHQ-2  01/01/2020

## 2020-11-05 NOTE — PROGRESS NOTES
"  SUBJECTIVE:   Issac Zelaya is a 85 year old male who presents for Preventive Visit.      Patient has been advised of split billing requirements and indicates understanding: Yes  Are you in the first 12 months of your Medicare Part B coverage?  No    Physical Health:    In general, how would you rate your overall physical health? good    Outside of work, how many days during the week do you exercise? none    Outside of work, approximately how many minutes a day do you exercise?not applicable    If you drink alcohol do you typically have >3 drinks per day or >7 drinks per week? No    Do you usually eat at least 4 servings of fruit and vegetables a day, include whole grains & fiber and avoid regularly eating high fat or \"junk\" foods? Yes    Do you have any problems taking medications regularly?  No    Do you have any side effects from medications? none    Needs assistance for the following daily activities: no assistance needed    Which of the following safety concerns are present in your home?  none identified     Hearing impairment: No    In the past 6 months, have you been bothered by leaking of urine? no    Mental Health:    In general, how would you rate your overall mental or emotional health? good  PHQ-2 Score:      Do you feel safe in your environment? Yes    Have you ever done Advance Care Planning? (For example, a Health Directive, POLST, or a discussion with a medical provider or your loved ones about your wishes): Yes, advance care planning is on file.    Additional concerns to address?  YES    Fall risk:  Fallen 2 or more times in the past year?: No  Any fall with injury in the past year?: No    Cognitive Screenin) Repeat 3 items (Leader, Season, Table)    2) Clock draw: NORMAL  3) 3 item recall: Recalls 1 object   Results: NORMAL clock, 1-2 items recalled: COGNITIVE IMPAIRMENT LESS LIKELY    Mini-CogTM Copyright NAHED [place]. Licensed by the author for use in [facility]; reprinted with " permission (manuela@[email or web address]). All rights reserved.      Do you have sleep apnea, excessive snoring or daytime drowsiness?: yes        Reviewed and updated as needed this visit by clinical staff  Tobacco  Allergies  Meds  Problems  Med Hx  Surg Hx  Fam Hx  Soc Hx          Reviewed and updated as needed this visit by Provider  Tobacco  Allergies  Meds  Problems  Med Hx  Surg Hx  Fam Hx         Social History     Tobacco Use     Smoking status: Former Smoker     Quit date: 1965     Years since quittin.8     Smokeless tobacco: Never Used   Substance Use Topics     Alcohol use: Yes     Alcohol/week: 0.0 standard drinks     Comment: 2-3 drinks per week                           Current providers sharing in care for this patient include:   Patient Care Team:  James Fajardo MD as PCP - General (Family Practice)  James Fajardo MD as Assigned PCP  Ventura Shields MD as Assigned Heart and Vascular Provider    The following health maintenance items are reviewed in Epic and correct as of today:  Health Maintenance   Topic Date Due     ZOSTER IMMUNIZATION (1 of 2) 1986     FALL RISK ASSESSMENT  2019     DTAP/TDAP/TD IMMUNIZATION (2 - Td) 11/10/2019     PHQ-2  2020     LIPID  10/13/2021     CREATININE  10/13/2021     MEDICARE ANNUAL WELLNESS VISIT  2021     ANNUAL REVIEW OF HM ORDERS  2021     ADVANCE CARE PLANNING  2025     INFLUENZA VACCINE  Completed     Pneumococcal Vaccine: 65+ Years  Completed     Pneumococcal Vaccine: Pediatrics (0 to 5 Years) and At-Risk Patients (6 to 64 Years)  Aged Out     IPV IMMUNIZATION  Aged Out     MENINGITIS IMMUNIZATION  Aged Out     HEPATITIS B IMMUNIZATION  Aged Out     Lab work is in process  Labs reviewed in EPIC      ROS:  Constitutional, HEENT, cardiovascular, pulmonary, gi and gu systems are negative, except as otherwise noted.    OBJECTIVE:   /67 (BP Location: Right arm, Patient Position: Chair,  "Cuff Size: Adult Large)   Pulse 66   Temp 98  F (36.7  C)   Resp 16   Ht 1.816 m (5' 11.5\")   Wt 112.9 kg (249 lb)   SpO2 97%   BMI 34.24 kg/m   Estimated body mass index is 34.24 kg/m  as calculated from the following:    Height as of this encounter: 1.816 m (5' 11.5\").    Weight as of this encounter: 112.9 kg (249 lb).  EXAM:   GENERAL: healthy, alert and no distress  NECK: no adenopathy, no asymmetry, masses, or scars and thyroid normal to palpation  RESP: lungs clear to auscultation - no rales, rhonchi or wheezes  CV: irregularly irregular rhythm, no murmur, click or rub and no peripheral edema  MS: no gross musculoskeletal defects noted, no edema    Diagnostic Test Results:  Labs reviewed in Epic    ASSESSMENT / PLAN:   Issac was seen today for wellness visit and sinus problem.    Diagnoses and all orders for this visit:    Encounter for Medicare annual wellness exam  -     MD ANNUAL WELLNESS VISIT, PPS, SUBSEQUENT  Stable. Recommended shingerix and patient declines at this time as was subjectively told by PCP to not get. I will follow-up with PCP to gain more insight as I do not see any contraindication.     Thoracic aortic ectasia (H)  Monitored by Cardiology. Repeat echo in 1-2 years per Cardiology.     Morbid obesity due to excess calories (H)  Recommended gentle activity and balanced diet.     Subdural hematoma (H)  Stable. No residual.     Seasonal allergic rhinitis due to pollen  -     fluticasone (FLONASE) 50 MCG/ACT nasal spray; Spray 1 spray into both nostrils daily  Etiology and medication discussed.     Other orders  -     REVIEW OF HEALTH MAINTENANCE PROTOCOL ORDERS        Patient has been advised of split billing requirements and indicates understanding: Yes    COUNSELING:  Reviewed preventive health counseling, as reflected in patient instructions    Estimated body mass index is 34.24 kg/m  as calculated from the following:    Height as of this encounter: 1.816 m (5' 11.5\").    Weight as " of this encounter: 112.9 kg (249 lb).    Weight management plan: Discussed healthy diet and exercise guidelines    He reports that he quit smoking about 55 years ago. He has never used smokeless tobacco.    Appropriate preventive services were discussed with this patient, including applicable screening as appropriate for cardiovascular disease, diabetes, osteopenia/osteoporosis, and glaucoma.  As appropriate for age/gender, discussed screening for colorectal cancer, prostate cancer, breast cancer, and cervical cancer. Checklist reviewing preventive services available has been given to the patient.    Reviewed patients plan of care and provided an AVS. The Complex Care Plan (for patients with higher acuity and needing more deliberate coordination of services) and is compliant with Specialty follow-up and care for Issac meets the Care Plan requirement. This Care Plan has been established and reviewed with the Patient and spouse.    Counseling Resources:  ATP IV Guidelines  Pooled Cohorts Equation Calculator  Breast Cancer Risk Calculator  BRCA-Related Cancer Risk Assessment: FHS-7 Tool  FRAX Risk Assessment  ICSI Preventive Guidelines  Dietary Guidelines for Americans, 2010  USDA's MyPlate  ASA Prophylaxis  Lung CA Screening    DARREN Pederson [facility]

## 2020-11-06 ENCOUNTER — HOSPITAL ENCOUNTER (OUTPATIENT)
Dept: CARDIOLOGY | Facility: CLINIC | Age: 85
Discharge: HOME | End: 2020-11-06
Attending: INTERNAL MEDICINE | Admitting: INTERNAL MEDICINE
Payer: COMMERCIAL

## 2020-11-06 DIAGNOSIS — I65.23 BILATERAL CAROTID ARTERY STENOSIS: ICD-10-CM

## 2020-11-06 PROCEDURE — 93880 EXTRACRANIAL BILAT STUDY: CPT

## 2020-11-06 PROCEDURE — 93880 EXTRACRANIAL BILAT STUDY: CPT | Mod: 26 | Performed by: INTERNAL MEDICINE

## 2020-11-09 NOTE — RESULT ENCOUNTER NOTE
"Pt had carotid US 11/7/20. Per Dr. Shields:  \"Mild carotid disease bilaterally, stent looks good.  Follow-up as planned.\"    Follow-up ordered in 1 yr.   Sent [employer] message w/ results/recommendations. Advised to call back sooner if any questions/concerns.   Stefan RN, BSN  11/09/20 3:52 PM"

## 2021-01-31 ENCOUNTER — IMMUNIZATION (OUTPATIENT)
Dept: NURSING | Facility: CLINIC | Age: 86
End: 2021-01-31
Payer: COMMERCIAL

## 2021-01-31 PROCEDURE — 0001A PR COVID VAC PFIZER DIL RECON 30 MCG/0.3 ML IM: CPT

## 2021-01-31 PROCEDURE — 91300 PR COVID VAC PFIZER DIL RECON 30 MCG/0.3 ML IM: CPT

## 2021-02-21 ENCOUNTER — IMMUNIZATION (OUTPATIENT)
Dept: NURSING | Facility: CLINIC | Age: 86
End: 2021-02-21
Attending: INTERNAL MEDICINE
Payer: COMMERCIAL

## 2021-02-21 PROCEDURE — 91300 PR COVID VAC PFIZER DIL RECON 30 MCG/0.3 ML IM: CPT

## 2021-02-21 PROCEDURE — 0002A PR COVID VAC PFIZER DIL RECON 30 MCG/0.3 ML IM: CPT

## 2021-09-26 ENCOUNTER — HEALTH MAINTENANCE LETTER (OUTPATIENT)
Age: 86
End: 2021-09-26

## 2021-10-01 ENCOUNTER — ALLIED HEALTH/NURSE VISIT (OUTPATIENT)
Dept: FAMILY MEDICINE | Facility: CLINIC | Age: 86
End: 2021-10-01
Payer: COMMERCIAL

## 2021-10-01 DIAGNOSIS — I48.21 PERMANENT ATRIAL FIBRILLATION (H): ICD-10-CM

## 2021-10-01 DIAGNOSIS — E78.5 DYSLIPIDEMIA: ICD-10-CM

## 2021-10-01 PROCEDURE — 80061 LIPID PANEL: CPT

## 2021-10-01 PROCEDURE — 91300 PR COVID VAC PFIZER DIL RECON 30 MCG/0.3 ML IM: CPT

## 2021-10-01 PROCEDURE — 36415 COLL VENOUS BLD VENIPUNCTURE: CPT

## 2021-10-01 PROCEDURE — 84460 ALANINE AMINO (ALT) (SGPT): CPT

## 2021-10-01 PROCEDURE — 0004A PR COVID VAC PFIZER DIL RECON 30 MCG/0.3 ML IM: CPT

## 2021-10-01 PROCEDURE — 80048 BASIC METABOLIC PNL TOTAL CA: CPT

## 2021-10-01 PROCEDURE — 99207 PR NO CHARGE NURSE ONLY: CPT

## 2021-10-02 LAB
ALT SERPL W P-5'-P-CCNC: 20 U/L (ref 0–70)
ANION GAP SERPL CALCULATED.3IONS-SCNC: 6 MMOL/L (ref 3–14)
BUN SERPL-MCNC: 19 MG/DL (ref 7–30)
CALCIUM SERPL-MCNC: 9.5 MG/DL (ref 8.5–10.1)
CHLORIDE BLD-SCNC: 112 MMOL/L (ref 94–109)
CHOLEST SERPL-MCNC: 110 MG/DL
CO2 SERPL-SCNC: 23 MMOL/L (ref 20–32)
CREAT SERPL-MCNC: 1.34 MG/DL (ref 0.66–1.25)
FASTING STATUS PATIENT QL REPORTED: YES
GFR SERPL CREATININE-BSD FRML MDRD: 48 ML/MIN/1.73M2
GLUCOSE BLD-MCNC: 103 MG/DL (ref 70–99)
HDLC SERPL-MCNC: 30 MG/DL
LDLC SERPL CALC-MCNC: 42 MG/DL
NONHDLC SERPL-MCNC: 80 MG/DL
POTASSIUM BLD-SCNC: 4.3 MMOL/L (ref 3.4–5.3)
SODIUM SERPL-SCNC: 141 MMOL/L (ref 133–144)
TRIGL SERPL-MCNC: 192 MG/DL

## 2021-10-12 ENCOUNTER — OFFICE VISIT (OUTPATIENT)
Dept: FAMILY MEDICINE | Facility: CLINIC | Age: 86
End: 2021-10-12
Payer: COMMERCIAL

## 2021-10-12 VITALS
TEMPERATURE: 98.4 F | DIASTOLIC BLOOD PRESSURE: 66 MMHG | OXYGEN SATURATION: 97 % | HEART RATE: 66 BPM | BODY MASS INDEX: 33.19 KG/M2 | HEIGHT: 71 IN | SYSTOLIC BLOOD PRESSURE: 115 MMHG | WEIGHT: 237.1 LBS

## 2021-10-12 DIAGNOSIS — S06.5XAA SUBDURAL HEMATOMA (H): ICD-10-CM

## 2021-10-12 DIAGNOSIS — G62.9 PERIPHERAL POLYNEUROPATHY: ICD-10-CM

## 2021-10-12 DIAGNOSIS — I48.20 CHRONIC ATRIAL FIBRILLATION (H): ICD-10-CM

## 2021-10-12 DIAGNOSIS — N18.31 STAGE 3A CHRONIC KIDNEY DISEASE (H): ICD-10-CM

## 2021-10-12 DIAGNOSIS — I77.810 THORACIC AORTIC ECTASIA (H): ICD-10-CM

## 2021-10-12 DIAGNOSIS — E66.01 MORBID OBESITY DUE TO EXCESS CALORIES (H): ICD-10-CM

## 2021-10-12 DIAGNOSIS — I10 ESSENTIAL HYPERTENSION, BENIGN: ICD-10-CM

## 2021-10-12 DIAGNOSIS — Z00.00 ENCOUNTER FOR MEDICARE ANNUAL WELLNESS EXAM: Primary | ICD-10-CM

## 2021-10-12 DIAGNOSIS — Z23 NEED FOR PROPHYLACTIC VACCINATION AND INOCULATION AGAINST INFLUENZA: ICD-10-CM

## 2021-10-12 PROBLEM — N18.30 CHRONIC KIDNEY DISEASE, STAGE 3 (H): Status: ACTIVE | Noted: 2021-10-12

## 2021-10-12 PROCEDURE — G0008 ADMIN INFLUENZA VIRUS VAC: HCPCS | Performed by: NURSE PRACTITIONER

## 2021-10-12 PROCEDURE — 99397 PER PM REEVAL EST PAT 65+ YR: CPT | Mod: 25 | Performed by: NURSE PRACTITIONER

## 2021-10-12 PROCEDURE — 90662 IIV NO PRSV INCREASED AG IM: CPT | Performed by: NURSE PRACTITIONER

## 2021-10-12 ASSESSMENT — MIFFLIN-ST. JEOR: SCORE: 1782.61

## 2021-10-12 NOTE — PATIENT INSTRUCTIONS
Patient Education   Personalized Prevention Plan  You are due for the preventive services outlined below.  Your care team is available to assist you in scheduling these services.  If you have already completed any of these items, please share that information with your care team to update in your medical record.  Health Maintenance Due   Topic Date Due     Zoster (Shingles) Vaccine (1 of 2) Never done     Diptheria Tetanus Pertussis (DTAP/TDAP/TD) Vaccine (2 - Td or Tdap) 11/10/2019     PHQ-2  01/01/2021     Flu Vaccine (1) 09/01/2021     Annual Wellness Visit  11/05/2021     ANNUAL REVIEW OF HM ORDERS  11/05/2021     FALL RISK ASSESSMENT  11/05/2021        Patient Education   Personalized Prevention Plan  You are due for the preventive services outlined below.  Your care team is available to assist you in scheduling these services.  If you have already completed any of these items, please share that information with your care team to update in your medical record.  Health Maintenance Due   Topic Date Due     Zoster (Shingles) Vaccine (1 of 2) Never done     Diptheria Tetanus Pertussis (DTAP/TDAP/TD) Vaccine (2 - Td or Tdap) 11/10/2019     PHQ-2  01/01/2021     Flu Vaccine (1) 09/01/2021     Annual Wellness Visit  11/05/2021     ANNUAL REVIEW OF HM ORDERS  11/05/2021     FALL RISK ASSESSMENT  11/05/2021        Patient Education   Personalized Prevention Plan  You are due for the preventive services outlined below.  Your care team is available to assist you in scheduling these services.  If you have already completed any of these items, please share that information with your care team to update in your medical record.  Health Maintenance Due   Topic Date Due     Zoster (Shingles) Vaccine (1 of 2) Never done     Diptheria Tetanus Pertussis (DTAP/TDAP/TD) Vaccine (2 - Td or Tdap) 11/10/2019     PHQ-2  01/01/2021     Flu Vaccine (1) 09/01/2021     ANNUAL REVIEW OF HM ORDERS  11/05/2021     FALL RISK ASSESSMENT   11/05/2021

## 2021-10-12 NOTE — PROGRESS NOTES
"  SUBJECTIVE:   Issac Zelaya is a 86 year old male who presents for Preventive Visit.    {  Patient has been advised of split billing requirements and indicates understanding: Yes  Are you in the first 12 months of your Medicare Part B coverage?  No    Physical Health:    In general, how would you rate your overall physical health? good    Outside of work, how many days during the week do you exercise? none    Outside of work, approximately how many minutes a day do you exercise?not applicable    If you drink alcohol do you typically have >3 drinks per day or >7 drinks per week? No    Do you usually eat at least 4 servings of fruit and vegetables a day, include whole grains & fiber and avoid regularly eating high fat or \"junk\" foods? NO    Do you have any problems taking medications regularly?  No    Do you have any side effects from medications? none    Needs assistance for the following daily activities: no assistance needed    Which of the following safety concerns are present in your home?  none identified     Hearing impairment: No    In the past 6 months, have you been bothered by leaking of urine? no    Mental Health:    In general, how would you rate your overall mental or emotional health? good  PHQ-2 Score:      Do you feel safe in your environment? Yes    Have you ever done Advance Care Planning? (For example, a Health Directive, POLST, or a discussion with a medical provider or your loved ones about your wishes): Yes, patient states has an Advance Care Planning document and will bring a copy to the clinic.    Additional concerns to address?  No    Fall risk:  Fallen 2 or more times in the past year?: No  Any fall with injury in the past year?: No    Cognitive Screenin) Repeat 3 items (Leader, Season, Table)    2) Clock draw: NORMAL  3) 3 item recall: Recalls 2 objects   Results: NORMAL clock, 1-2 items recalled: COGNITIVE IMPAIRMENT LESS LIKELY    Mini-CogTM Copyright S Lacie. Licensed by the " author for use in [facility]; reprinted with permission ([email or web address]). All rights reserved.      Do you have sleep apnea, excessive snoring or daytime drowsiness?: yes        Reviewed and updated as needed this visit by clinical staff  Tobacco  Allergies  Meds  Problems  Med Hx  Surg Hx  Fam Hx  Soc Hx          Reviewed and updated as needed this visit by Provider  Tobacco  Allergies  Meds  Problems  Med Hx  Surg Hx  Fam Hx         Social History     Tobacco Use     Smoking status: Former Smoker     Quit date: 1965     Years since quittin.8     Smokeless tobacco: Never Used   Substance Use Topics     Alcohol use: Yes     Alcohol/week: 0.0 standard drinks     Comment: 2-3 drinks per week                           Current providers sharing in care for this patient include:   Patient Care Team:  James Fajardo MD as PCP - General (Family Practice)  Ventura Shields MD as Assigned Heart and Vascular Provider  Felicita Hidalgo APRN CNP as Assigned PCP    The following health maintenance items are reviewed in Epic and correct as of today:  Health Maintenance   Topic Date Due     ZOSTER IMMUNIZATION (1 of 2) Never done     DTAP/TDAP/TD IMMUNIZATION (2 - Td or Tdap) 11/10/2019     PHQ-2  2021     FALL RISK ASSESSMENT  2021     LIPID  10/01/2022     CREATININE  10/01/2022     MEDICARE ANNUAL WELLNESS VISIT  10/12/2022     ANNUAL REVIEW OF HM ORDERS  10/12/2022     ADVANCE CARE PLANNING  10/12/2026     INFLUENZA VACCINE  Completed     Pneumococcal Vaccine: 65+ Years  Completed     COVID-19 Vaccine  Completed     IPV IMMUNIZATION  Aged Out     MENINGITIS IMMUNIZATION  Aged Out     HEPATITIS B IMMUNIZATION  Aged Out     Labs reviewed in EPIC      ROS:  Constitutional, HEENT, cardiovascular, pulmonary, gi and gu systems are negative, except as otherwise noted.    OBJECTIVE:   /66 (BP Location: Right arm, Patient Position: Chair, Cuff Size: Adult Large)    "Pulse 66   Temp 98.4  F (36.9  C) (Oral)   Ht 1.803 m (5' 11\")   Wt 107.5 kg (237 lb 1.6 oz)   SpO2 97%   BMI 33.07 kg/m   Estimated body mass index is 33.07 kg/m  as calculated from the following:    Height as of this encounter: 1.803 m (5' 11\").    Weight as of this encounter: 107.5 kg (237 lb 1.6 oz).  EXAM:   GENERAL: healthy, alert and no distress  RESP: lungs clear to auscultation - no rales, rhonchi or wheezes  CV: regular rate and rhythm, normal S1 S2, no S3 or S4, no murmur, click or rub, no peripheral edema and peripheral pulses strong  MS: no gross musculoskeletal defects noted, no edema  SKIN: no suspicious lesions or rashes  PSYCH: mentation appears normal, affect normal/bright    Diagnostic Test Results:  Labs reviewed in Epic    ASSESSMENT / PLAN:   Issac was seen today for wellness visit and imm/inj.    Diagnoses and all orders for this visit:    Encounter for Medicare annual wellness exam    Stage 3a chronic kidney disease (H)  Stable. Blood pressure well controlled.     Thoracic aortic ectasia (H)  Chronic atrial fibrillation (H)  Essential hypertension   Follows with Cardiology. Follow-up 11/24/21.     Morbid obesity due to excess calories (H)  Activity as able.     Subdural hematoma (H)  Stable. No residual.     Peripheral polyneuropathy  Stable, manageable. Has tried and failed multiple interventions with Neuropathy     Need for prophylactic vaccination and inoculation against influenza    Other orders  -     REVIEW OF HEALTH MAINTENANCE PROTOCOL ORDERS  -     INFLUENZA, QUAD, HIGH DOSE, PF, 65YR + (FLUZONE HD)        Patient has been advised of split billing requirements and indicates understanding: Yes    COUNSELING:  Reviewed preventive health counseling, as reflected in patient instructions    Estimated body mass index is 33.07 kg/m  as calculated from the following:    Height as of this encounter: 1.803 m (5' 11\").    Weight as of this encounter: 107.5 kg (237 lb 1.6 oz).    Weight " management plan: Discussed healthy diet and exercise guidelines    He reports that he quit smoking about 56 years ago. He has never used smokeless tobacco.    Appropriate preventive services were discussed with this patient, including applicable screening as appropriate for cardiovascular disease, diabetes, osteopenia/osteoporosis, and glaucoma.  As appropriate for age/gender, discussed screening for colorectal cancer, prostate cancer, breast cancer, and cervical cancer. Checklist reviewing preventive services available has been given to the patient.    Reviewed patients plan of care and provided an AVS. The Complex Care Plan (for patients with higher acuity and needing more deliberate coordination of services) for Issac meets the Care Plan requirement. This Care Plan has been established and reviewed with the Patient.    Counseling Resources:  ATP IV Guidelines  Pooled Cohorts Equation Calculator  Breast Cancer Risk Calculator  BRCA-Related Cancer Risk Assessment: FHS-7 Tool  FRAX Risk Assessment  ICSI Preventive Guidelines  Dietary Guidelines for Americans, 2010  USDA's MyPlate  ASA Prophylaxis  Lung CA Screening    DARREN Pederson [facility]

## 2021-11-11 ENCOUNTER — TRANSFERRED RECORDS (OUTPATIENT)
Dept: HEALTH INFORMATION MANAGEMENT | Facility: CLINIC | Age: 86
End: 2021-11-11

## 2021-11-23 NOTE — PROGRESS NOTES
CARDIOLOGY VISIT    REASON FOR VISIT: afib    SUBJECTIVE:  86-year-old male seen for follow-up of chronic atrial fibrillation.  He also has hypertension, TIA, carotid artery disease (left ICA stent in 2007), and sleep apnea.       He was diagnosed with A. fib in 2013 at the time of colonoscopy.  He has been on Xarelto 15mg daily (lower dose) and also because of his carotid disease, clopidogrel daily.       Echo August 2019 (afib) showed EF 60%, mild RV dilation with normal function, 1+ TR, RVSP 31 mmHg, sinus of Valsalva 3.8 cm, ascending aorta 4.2 cm.       Carotid ultrasound November 2020 showed less than 50% stenosis bilaterally, left carotid stent patent.    He has been feeling the same.  He has some chronic dyspnea with exertion which is unchanged.  He has neuropathy which limits his walking somewhat.  He denies any chest pain or palpitations.    MEDICATIONS:  Current Outpatient Medications   Medication     atenolol (TENORMIN) 50 MG tablet     atorvastatin (LIPITOR) 20 MG tablet     B-12 OR     CENTRUM OR     clopidogrel (PLAVIX) 75 MG tablet     fluticasone (FLONASE) 50 MCG/ACT nasal spray     furosemide (LASIX) 20 MG tablet     lisinopril (ZESTRIL) 5 MG tablet     omega 3 1000 MG CAPS     rivaroxaban ANTICOAGULANT (XARELTO) 15 MG TABS tablet     No current facility-administered medications for this visit.       ALLERGIES:  Allergies   Allergen Reactions     Epinephrine Shortness Of Breath     Very rapid heartrate     Dabigatran Itching       REVIEW OF SYSTEMS:  Constitutional:  No weight loss, fever, chills, weakness or fatigue.  HEENT:  Eyes:  No visual loss, blurred vision, double vision or yellow sclerae. No hearing loss, sneezing, congestion, runny nose or sore throat.  Skin:  No rash or itching.  Cardiovascular: per HPI  Respiratory: per HPI  GI:  No anorexia, nausea, vomiting or diarrhea. No abdominal pain or blood.  :  No dysurea, hematuria  Neurologic:  No headache, dizziness, syncope, paralysis,  "ataxia, numbness or tingling in the extremities. No change in bowel or bladder control.  Musculoskeletal:  No muscle, back pain, joint pain or stiffness.  Hematologic:  No anemia, bleeding or bruising.  Lymphatics:  No enlarged nodes. No history of splenectomy.  Psychiatric:  No history of depression or anxiety.  Endocrine:  No reports of sweating, cold or heat intolerance. No polyuria or polydipsia.  Allergies:  No history of asthma, hives, eczema or rhinitis.    PHYSICAL EXAM:  /68 (BP Location: Right arm, Patient Position: Chair, Cuff Size: Adult Large)   Pulse 67   Ht 1.803 m (5' 11\")   Wt 107.3 kg (236 lb 9.6 oz)   SpO2 99%   BMI 33.00 kg/m      Constitutional: awake, alert, no distress  Eyes: PERRL, sclera nonicteric  ENT: trachea midline  Respiratory: Lungs clear  Cardiovascular: Regular rate, irregular rhythm, no edema  GI: nondistended, nontender, bowel sounds present  Lymph/Hematologic: no lymphadenopathy  Skin: dry, no rash  Musculoskeletal: good muscle tone, strength 5/5 in upper and lower extremities  Neurologic: no focal deficits  Neuropsychiatric: appropriate affact    DATA:  Lab: October 2021: Creatinine 1.3  Recent Labs   Lab Test 10/01/21  0841 10/13/20  0920 07/11/16  1110 06/29/15  1142 05/13/14  1015   CHOL 110 107   < > 101 112   HDL 30* 36*   < > 38* 26*   LDL 42 37   < > 35 56   TRIG 192* 168*   < > 138 152*   CHOLHDLRATIO  --   --   --  2.7 4.3    < > = values in this interval not displayed.     ASSESSMENT:  86-year-old male seen for A. fib.  His A. fib is under good control.  He will continue on the Xarelto.  He has no evidence of heart failure.    He does have neuropathy, this could raise a little suspicion for amyloid.  Although he has no evidence of heart failure at this time.    RECOMMENDATIONS:  1.  Chronic atrial fibrillation  -Continue atenolol and Xarelto    2.  Carotid disease, status post left ICA stent  -Carotid ultrasound next year    Follow-up in 1 year.    Ventura" MD Cornelius  Cardiology - [facility]  Pager:  [phone]  Text Page  November 24, 2021

## 2021-11-24 ENCOUNTER — OFFICE VISIT (OUTPATIENT)
Dept: CARDIOLOGY | Facility: CLINIC | Age: 86
End: 2021-11-24
Payer: COMMERCIAL

## 2021-11-24 VITALS
OXYGEN SATURATION: 99 % | SYSTOLIC BLOOD PRESSURE: 116 MMHG | HEART RATE: 67 BPM | HEIGHT: 71 IN | WEIGHT: 236.6 LBS | DIASTOLIC BLOOD PRESSURE: 68 MMHG | BODY MASS INDEX: 33.12 KG/M2

## 2021-11-24 DIAGNOSIS — I10 ESSENTIAL HYPERTENSION WITH GOAL BLOOD PRESSURE LESS THAN 140/90: ICD-10-CM

## 2021-11-24 DIAGNOSIS — I48.20 CHRONIC ATRIAL FIBRILLATION (H): ICD-10-CM

## 2021-11-24 DIAGNOSIS — I10 HYPERTENSION GOAL BP (BLOOD PRESSURE) < 140/90: ICD-10-CM

## 2021-11-24 DIAGNOSIS — E78.5 HYPERLIPIDEMIA LDL GOAL <100: ICD-10-CM

## 2021-11-24 DIAGNOSIS — I10 ESSENTIAL HYPERTENSION, BENIGN: ICD-10-CM

## 2021-11-24 DIAGNOSIS — G47.33 OSA (OBSTRUCTIVE SLEEP APNEA): ICD-10-CM

## 2021-11-24 DIAGNOSIS — I65.22 STENOSIS OF LEFT CAROTID ARTERY: ICD-10-CM

## 2021-11-24 PROCEDURE — 99214 OFFICE O/P EST MOD 30 MIN: CPT | Performed by: INTERNAL MEDICINE

## 2021-11-24 RX ORDER — ATORVASTATIN CALCIUM 20 MG/1
20 TABLET, FILM COATED ORAL DAILY
Qty: 90 TABLET | Refills: 3 | Status: SHIPPED | OUTPATIENT
Start: 2021-11-24 | End: 2022-08-01

## 2021-11-24 RX ORDER — CLOPIDOGREL BISULFATE 75 MG/1
75 TABLET ORAL DAILY
Qty: 90 TABLET | Refills: 3 | Status: SHIPPED | OUTPATIENT
Start: 2021-11-24 | End: 2022-08-01

## 2021-11-24 RX ORDER — LISINOPRIL 5 MG/1
5 TABLET ORAL DAILY
Qty: 90 TABLET | Refills: 3 | Status: SHIPPED | OUTPATIENT
Start: 2021-11-24 | End: 2022-12-28

## 2021-11-24 RX ORDER — FUROSEMIDE 20 MG
20 TABLET ORAL DAILY
Qty: 90 TABLET | Refills: 3 | Status: SHIPPED | OUTPATIENT
Start: 2021-11-24 | End: 2023-02-01

## 2021-11-24 RX ORDER — ATENOLOL 50 MG/1
TABLET ORAL
Qty: 90 TABLET | Refills: 3 | Status: SHIPPED | OUTPATIENT
Start: 2021-11-24 | End: 2022-12-28

## 2021-11-24 ASSESSMENT — MIFFLIN-ST. JEOR: SCORE: 1780.34

## 2021-11-24 NOTE — LETTER
11/24/2021    James Fajardo MD  [address]  [facility] [address]    RE: Issac Zelaya       Dear Colleague,    I had the pleasure of seeing Issac Zelaya in the [facility].  CARDIOLOGY VISIT    REASON FOR VISIT: afib    SUBJECTIVE:  86-year-old male seen for follow-up of chronic atrial fibrillation.  He also has hypertension, TIA, carotid artery disease (left ICA stent in 2007), and sleep apnea.       He was diagnosed with A. fib in 2013 at the time of colonoscopy.  He has been on Xarelto 15mg daily (lower dose) and also because of his carotid disease, clopidogrel daily.       Echo August 2019 (afib) showed EF 60%, mild RV dilation with normal function, 1+ TR, RVSP 31 mmHg, sinus of Valsalva 3.8 cm, ascending aorta 4.2 cm.       Carotid ultrasound November 2020 showed less than 50% stenosis bilaterally, left carotid stent patent.    He has been feeling the same.  He has some chronic dyspnea with exertion which is unchanged.  He has neuropathy which limits his walking somewhat.  He denies any chest pain or palpitations.    MEDICATIONS:  Current Outpatient Medications   Medication     atenolol (TENORMIN) 50 MG tablet     atorvastatin (LIPITOR) 20 MG tablet     B-12 OR     CENTRUM OR     clopidogrel (PLAVIX) 75 MG tablet     fluticasone (FLONASE) 50 MCG/ACT nasal spray     furosemide (LASIX) 20 MG tablet     lisinopril (ZESTRIL) 5 MG tablet     omega 3 1000 MG CAPS     rivaroxaban ANTICOAGULANT (XARELTO) 15 MG TABS tablet     No current facility-administered medications for this visit.       ALLERGIES:  Allergies   Allergen Reactions     Epinephrine Shortness Of Breath     Very rapid heartrate     Dabigatran Itching       REVIEW OF SYSTEMS:  Constitutional:  No weight loss, fever, chills, weakness or fatigue.  HEENT:  Eyes:  No visual loss, blurred vision, double vision or yellow sclerae. No hearing loss, sneezing, congestion, runny nose or sore  "throat.  Skin:  No rash or itching.  Cardiovascular: per HPI  Respiratory: per HPI  GI:  No anorexia, nausea, vomiting or diarrhea. No abdominal pain or blood.  :  No dysurea, hematuria  Neurologic:  No headache, dizziness, syncope, paralysis, ataxia, numbness or tingling in the extremities. No change in bowel or bladder control.  Musculoskeletal:  No muscle, back pain, joint pain or stiffness.  Hematologic:  No anemia, bleeding or bruising.  Lymphatics:  No enlarged nodes. No history of splenectomy.  Psychiatric:  No history of depression or anxiety.  Endocrine:  No reports of sweating, cold or heat intolerance. No polyuria or polydipsia.  Allergies:  No history of asthma, hives, eczema or rhinitis.    PHYSICAL EXAM:  /68 (BP Location: Right arm, Patient Position: Chair, Cuff Size: Adult Large)   Pulse 67   Ht 1.803 m (5' 11\")   Wt 107.3 kg (236 lb 9.6 oz)   SpO2 99%   BMI 33.00 kg/m      Constitutional: awake, alert, no distress  Eyes: PERRL, sclera nonicteric  ENT: trachea midline  Respiratory: Lungs clear  Cardiovascular: Regular rate, irregular rhythm, no edema  GI: nondistended, nontender, bowel sounds present  Lymph/Hematologic: no lymphadenopathy  Skin: dry, no rash  Musculoskeletal: good muscle tone, strength 5/5 in upper and lower extremities  Neurologic: no focal deficits  Neuropsychiatric: appropriate affact    DATA:  Lab: October 2021: Creatinine 1.3  Recent Labs   Lab Test 10/01/21  0841 10/13/20  0920 07/11/16  1110 06/29/15  1142 05/13/14  1015   CHOL 110 107   < > 101 112   HDL 30* 36*   < > 38* 26*   LDL 42 37   < > 35 56   TRIG 192* 168*   < > 138 152*   CHOLHDLRATIO  --   --   --  2.7 4.3    < > = values in this interval not displayed.     ASSESSMENT:  86-year-old male seen for A. fib.  His A. fib is under good control.  He will continue on the Xarelto.  He has no evidence of heart failure.    He does have neuropathy, this could raise a little suspicion for amyloid.  Although he has " no evidence of heart failure at this time.    RECOMMENDATIONS:  1.  Chronic atrial fibrillation  -Continue atenolol and Xarelto    2.  Carotid disease, status post left ICA stent  -Carotid ultrasound next year    Follow-up in 1 year.      Ventura Shields MD  Cardiology - [facility]  Pager:  [phone]  Text Page  November 24, 2021

## 2022-08-01 ENCOUNTER — TELEPHONE (OUTPATIENT)
Dept: CARDIOLOGY | Facility: CLINIC | Age: 87
End: 2022-08-01

## 2022-08-01 DIAGNOSIS — E78.5 HYPERLIPIDEMIA LDL GOAL <100: ICD-10-CM

## 2022-08-01 DIAGNOSIS — I10 ESSENTIAL HYPERTENSION, BENIGN: ICD-10-CM

## 2022-08-01 DIAGNOSIS — I48.20 CHRONIC ATRIAL FIBRILLATION (H): ICD-10-CM

## 2022-08-01 DIAGNOSIS — I10 HYPERTENSION GOAL BP (BLOOD PRESSURE) < 140/90: ICD-10-CM

## 2022-08-01 DIAGNOSIS — G47.33 OSA (OBSTRUCTIVE SLEEP APNEA): ICD-10-CM

## 2022-08-01 DIAGNOSIS — I65.22 STENOSIS OF LEFT CAROTID ARTERY: ICD-10-CM

## 2022-08-01 RX ORDER — ATORVASTATIN CALCIUM 20 MG/1
20 TABLET, FILM COATED ORAL DAILY
Qty: 12 TABLET | Refills: 0 | Status: SHIPPED | OUTPATIENT
Start: 2022-08-01 | End: 2022-12-28

## 2022-08-01 RX ORDER — CLOPIDOGREL BISULFATE 75 MG/1
75 TABLET ORAL DAILY
Qty: 12 TABLET | Refills: 0 | Status: SHIPPED | OUTPATIENT
Start: 2022-08-01 | End: 2022-12-28

## 2022-08-01 NOTE — TELEPHONE ENCOUNTER
[facility] Cardiology Refill Guideline reviewed.  Medication meets criteria for refill.     **Short term supply sent, awaiting mail pharmacy to send medications**    Received refill request for:  Lipitor and Plavix  Last OV was: 11/24/21  Labs/EKG: October 2021  F/U scheduled: Follow up ordered for November 2022  New script sent to: Jose Alfredo DUNLAP RN  08/01/22 at 11:28 AM

## 2022-08-01 NOTE — TELEPHONE ENCOUNTER
[facility] Call Center    Phone Message    May a detailed message be left on voicemail: yes     Reason for Call: Other: Patient called and spoke with writer, he states he is out of plavix and would like a short supply sent to his local pharmacy as he is still waiting on [facility]. Please call Karthik to discuss further      Action Taken: Message routed to:  [facility] ([facility] Cardio     Travel Screening: Not Applicable

## 2022-08-10 NOTE — PATIENT INSTRUCTIONS
1. Keep all medications the same    2. Will order ultrasound of the carotid arteries    3. Follow up in one year.   Consent: Written consent was obtained and risks were reviewed including but not limited to scarring, infection, bleeding, scabbing, incomplete removal, nerve damage and allergy to anesthesia.

## 2022-08-11 ENCOUNTER — TELEPHONE (OUTPATIENT)
Dept: FAMILY MEDICINE | Facility: CLINIC | Age: 87
End: 2022-08-11

## 2022-08-11 NOTE — TELEPHONE ENCOUNTER
Patient Quality Outreach    Patient is due for the following:   Physical Annual Wellness Visit    Next Steps:   Schedule a Annual Wellness Visit after 10/12/2022    Type of outreach:    Sent [employer] message.      Questions for provider review:    None     Dora Cruz, CMA

## 2022-09-12 ENCOUNTER — TELEPHONE (OUTPATIENT)
Dept: CARDIOLOGY | Facility: CLINIC | Age: 87
End: 2022-09-12

## 2022-09-12 NOTE — TELEPHONE ENCOUNTER
Returned patients call. Per Dr Shields note, he does not say anything about needing an echocardiogram but does say he needs a carotid ultrasound. Informed patient, patient will schedule ultrasound.     Aleyda DUNLAP RN  09/12/22 at 1:11 PM

## 2022-09-12 NOTE — TELEPHONE ENCOUNTER
[facility] Call Center    Phone Message    May a detailed message be left on voicemail: no     Reason for Call: Other: Karthik called to schedule an Echo prior to his 2/1/2023 appointment with Dr. Shields, however, no order is present. Please add an order and reach out to Karthik at [phone].     Action Taken: Other: [facility]    Travel Screening: Not Applicable

## 2022-11-08 ENCOUNTER — OFFICE VISIT (OUTPATIENT)
Dept: FAMILY MEDICINE | Facility: CLINIC | Age: 87
End: 2022-11-08
Payer: COMMERCIAL

## 2022-11-08 VITALS
HEIGHT: 71 IN | OXYGEN SATURATION: 100 % | BODY MASS INDEX: 28.6 KG/M2 | TEMPERATURE: 97.6 F | WEIGHT: 204.3 LBS | HEART RATE: 55 BPM | DIASTOLIC BLOOD PRESSURE: 66 MMHG | RESPIRATION RATE: 16 BRPM | SYSTOLIC BLOOD PRESSURE: 104 MMHG

## 2022-11-08 DIAGNOSIS — S06.5X9A TRAUMATIC SUBDURAL HEMORRHAGE WITH LOSS OF CONSCIOUSNESS OF UNSPECIFIED DURATION, INITIAL ENCOUNTER (H): ICD-10-CM

## 2022-11-08 DIAGNOSIS — G62.9 PERIPHERAL POLYNEUROPATHY: ICD-10-CM

## 2022-11-08 DIAGNOSIS — Z71.89 ADVANCED DIRECTIVES, COUNSELING/DISCUSSION: ICD-10-CM

## 2022-11-08 DIAGNOSIS — Z23 NEED FOR PROPHYLACTIC VACCINATION AND INOCULATION AGAINST INFLUENZA: ICD-10-CM

## 2022-11-08 DIAGNOSIS — R13.10 DYSPHAGIA, UNSPECIFIED TYPE: ICD-10-CM

## 2022-11-08 DIAGNOSIS — I48.21 PERMANENT ATRIAL FIBRILLATION (H): ICD-10-CM

## 2022-11-08 DIAGNOSIS — Z00.00 ENCOUNTER FOR MEDICARE ANNUAL WELLNESS EXAM: Primary | ICD-10-CM

## 2022-11-08 DIAGNOSIS — I48.20 CHRONIC ATRIAL FIBRILLATION (H): ICD-10-CM

## 2022-11-08 DIAGNOSIS — I77.810 THORACIC AORTIC ECTASIA (H): ICD-10-CM

## 2022-11-08 DIAGNOSIS — J31.0 CHRONIC RHINITIS: ICD-10-CM

## 2022-11-08 DIAGNOSIS — R60.0 LOCALIZED EDEMA: ICD-10-CM

## 2022-11-08 DIAGNOSIS — N18.30 STAGE 3 CHRONIC KIDNEY DISEASE, UNSPECIFIED WHETHER STAGE 3A OR 3B CKD (H): ICD-10-CM

## 2022-11-08 DIAGNOSIS — L30.9 ECZEMA, UNSPECIFIED TYPE: ICD-10-CM

## 2022-11-08 DIAGNOSIS — I65.22 STENOSIS OF LEFT CAROTID ARTERY: ICD-10-CM

## 2022-11-08 DIAGNOSIS — Z23 HIGH PRIORITY FOR 2019-NCOV VACCINE: ICD-10-CM

## 2022-11-08 DIAGNOSIS — I25.10 CORONARY ARTERY DISEASE WITHOUT ANGINA PECTORIS, UNSPECIFIED VESSEL OR LESION TYPE, UNSPECIFIED WHETHER NATIVE OR TRANSPLANTED HEART: ICD-10-CM

## 2022-11-08 DIAGNOSIS — I10 ESSENTIAL HYPERTENSION WITH GOAL BLOOD PRESSURE LESS THAN 140/90: ICD-10-CM

## 2022-11-08 DIAGNOSIS — G47.33 OSA (OBSTRUCTIVE SLEEP APNEA): ICD-10-CM

## 2022-11-08 PROBLEM — E66.01 MORBID OBESITY (H): Status: RESOLVED | Noted: 2017-06-20 | Resolved: 2022-11-08

## 2022-11-08 LAB
ANION GAP SERPL CALCULATED.3IONS-SCNC: 7 MMOL/L (ref 3–14)
BUN SERPL-MCNC: 20 MG/DL (ref 7–30)
CALCIUM SERPL-MCNC: 9.7 MG/DL (ref 8.5–10.1)
CHLORIDE BLD-SCNC: 107 MMOL/L (ref 94–109)
CHOLEST SERPL-MCNC: 142 MG/DL
CO2 SERPL-SCNC: 24 MMOL/L (ref 20–32)
CREAT SERPL-MCNC: 1.28 MG/DL (ref 0.66–1.25)
CREAT UR-MCNC: 162 MG/DL
ERYTHROCYTE [DISTWIDTH] IN BLOOD BY AUTOMATED COUNT: 13.2 % (ref 10–15)
FASTING STATUS PATIENT QL REPORTED: ABNORMAL
GFR SERPL CREATININE-BSD FRML MDRD: 55 ML/MIN/1.73M2
GLUCOSE BLD-MCNC: 100 MG/DL (ref 70–99)
HCT VFR BLD AUTO: 42.3 % (ref 40–53)
HDLC SERPL-MCNC: 46 MG/DL
HGB BLD-MCNC: 14.7 G/DL (ref 13.3–17.7)
LDLC SERPL CALC-MCNC: 65 MG/DL
MCH RBC QN AUTO: 34.1 PG (ref 26.5–33)
MCHC RBC AUTO-ENTMCNC: 34.8 G/DL (ref 31.5–36.5)
MCV RBC AUTO: 98 FL (ref 78–100)
MICROALBUMIN UR-MCNC: 38 MG/L
MICROALBUMIN/CREAT UR: 23.46 MG/G CR (ref 0–17)
NONHDLC SERPL-MCNC: 96 MG/DL
PLATELET # BLD AUTO: 196 10E3/UL (ref 150–450)
POTASSIUM BLD-SCNC: 4.6 MMOL/L (ref 3.4–5.3)
RBC # BLD AUTO: 4.31 10E6/UL (ref 4.4–5.9)
SODIUM SERPL-SCNC: 138 MMOL/L (ref 133–144)
TRIGL SERPL-MCNC: 153 MG/DL
WBC # BLD AUTO: 6.2 10E3/UL (ref 4–11)

## 2022-11-08 PROCEDURE — 80048 BASIC METABOLIC PNL TOTAL CA: CPT | Performed by: NURSE PRACTITIONER

## 2022-11-08 PROCEDURE — 36415 COLL VENOUS BLD VENIPUNCTURE: CPT | Performed by: NURSE PRACTITIONER

## 2022-11-08 PROCEDURE — 91312 COVID-19,PF,PFIZER BOOSTER BIVALENT: CPT | Performed by: NURSE PRACTITIONER

## 2022-11-08 PROCEDURE — 99214 OFFICE O/P EST MOD 30 MIN: CPT | Mod: 25 | Performed by: NURSE PRACTITIONER

## 2022-11-08 PROCEDURE — 82043 UR ALBUMIN QUANTITATIVE: CPT | Performed by: NURSE PRACTITIONER

## 2022-11-08 PROCEDURE — G0008 ADMIN INFLUENZA VIRUS VAC: HCPCS | Performed by: NURSE PRACTITIONER

## 2022-11-08 PROCEDURE — 90662 IIV NO PRSV INCREASED AG IM: CPT | Performed by: NURSE PRACTITIONER

## 2022-11-08 PROCEDURE — G0439 PPPS, SUBSEQ VISIT: HCPCS | Performed by: NURSE PRACTITIONER

## 2022-11-08 PROCEDURE — 85027 COMPLETE CBC AUTOMATED: CPT | Performed by: NURSE PRACTITIONER

## 2022-11-08 PROCEDURE — 0124A COVID-19,PF,PFIZER BOOSTER BIVALENT: CPT | Performed by: NURSE PRACTITIONER

## 2022-11-08 PROCEDURE — 80061 LIPID PANEL: CPT | Performed by: NURSE PRACTITIONER

## 2022-11-08 RX ORDER — FLUTICASONE PROPIONATE 50 MCG
1 SPRAY, SUSPENSION (ML) NASAL DAILY
Qty: 16 G | Refills: 0 | Status: SHIPPED | OUTPATIENT
Start: 2022-11-08 | End: 2023-09-18

## 2022-11-08 RX ORDER — TRIAMCINOLONE ACETONIDE 1 MG/G
CREAM TOPICAL 2 TIMES DAILY
Qty: 453.6 G | Refills: 0 | Status: SHIPPED | OUTPATIENT
Start: 2022-11-08

## 2022-11-08 ASSESSMENT — ACTIVITIES OF DAILY LIVING (ADL): CURRENT_FUNCTION: NO ASSISTANCE NEEDED

## 2022-11-08 ASSESSMENT — ENCOUNTER SYMPTOMS
PSYCHIATRIC NEGATIVE: 1
CARDIOVASCULAR NEGATIVE: 1
TROUBLE SWALLOWING: 1
EYES NEGATIVE: 1
DIARRHEA: 1
SHORTNESS OF BREATH: 1
NEUROLOGICAL NEGATIVE: 1
BRUISES/BLEEDS EASILY: 1

## 2022-11-08 NOTE — PROGRESS NOTES
"SUBJECTIVE:   Karthik is a 87 year old who presents for Preventive Visit.    Bilateral UE itch, rash, and bleeding x 1-1.5 months. On plavix and xarelto. Itch and rash started, bleeding came with vigorous itching.    History of chronic rhinitis. Unknown treatment in past.      Patient has been advised of split billing requirements and indicates understanding: Yes  Are you in the first 12 months of your Medicare coverage?  No    Healthy Habits:    In general, how would you rate your overall health?  Good    Frequency of exercise:  None    Duration of exercise:  Less than 15 minutes    Do you usually eat at least 4 servings of fruit and vegetables a day, include whole grains    & fiber and avoid regularly eating high fat or \"junk\" foods?  No    Taking medications regularly:  Yes    Barriers to taking medications:  None    Medication side effects:  None    Ability to successfully perform activities of daily living:  No assistance needed    Home Safety:  No safety concerns identified    Hearing Impairment:  No hearing concerns (wife would say differently)    In the past 6 months, have you been bothered by leaking of urine?  No    In general, how would you rate your overall mental or emotional health?  Very good      PHQ-2 Total Score:    Additional concerns today:  Yes (breaking out from what he believes is his blood thinner. itching all over on both arms. started about a month ago. He also has a runny nose. usually early in the mornings. )    Do you feel safe in your environment? Yes    Have you ever done Advance Care Planning? (For example, a Health Directive, POLST, or a discussion with a medical provider or your loved ones about your wishes): No, advance care planning information given to patient to review.  Patient declined advance care planning discussion at this time.      Fall risk  Fallen 2 or more times in the past year?: No  Any fall with injury in the past year?: No    Cognitive Screening   1) Repeat 3 items " (Leader, Season, Table)    2) Clock draw: NORMAL  3) 3 item recall: Recalls 2 objects   Results: NORMAL clock, 1-2 items recalled: COGNITIVE IMPAIRMENT LESS LIKELY    Mini-CogTM Copyright NAHED [place]. Licensed by the author for use in [facility]; reprinted with permission (manuela@[facility]). All rights reserved.      Do you have sleep apnea, excessive snoring or daytime drowsiness?: yes    Reviewed and updated as needed this visit by clinical staff   Tobacco  Allergies  Meds   Med Hx  Surg Hx  Fam Hx  Soc Hx        Reviewed and updated as needed this visit by Provider                 Social History     Tobacco Use     Smoking status: Former     Types: Cigarettes     Quit date: 1965     Years since quittin.8     Smokeless tobacco: Never   Substance Use Topics     Alcohol use: Yes     Alcohol/week: 0.0 standard drinks     Comment: 2-3 drinks per week     If you drink alcohol do you typically have >3 drinks per day or >7 drinks per week? No    No flowsheet data found.      Current providers sharing in care for this patient include:   Patient Care Team:  No Ref-Primary, Physician as PCP - General  Ventura Shields MD as Assigned Heart and Vascular Provider  Felicita Hidalgo APRN CNP as Assigned PCP  Ventura Shields MD as MD (Cardiovascular Disease)    The following health maintenance items are reviewed in Epic and correct as of today:  Health Maintenance   Topic Date Due     HEPATITIS B IMMUNIZATION (1 of 3 - 3-dose series) Never done     URINALYSIS  Never done     ZOSTER IMMUNIZATION (1 of 2) Never done     MICROALBUMIN  2019     DTAP/TDAP/TD IMMUNIZATION (2 - Td or Tdap) 11/10/2019     HEMOGLOBIN  10/13/2021     COVID-19 Vaccine (4 - Booster for Pfizer series) 2021     INFLUENZA VACCINE (1) 2022     BMP  10/01/2022     LIPID  10/01/2022     CREATININE  10/01/2022     ANNUAL REVIEW OF HM ORDERS  10/12/2022     MEDICARE ANNUAL WELLNESS VISIT  10/12/2022      "FALL RISK ASSESSMENT  11/08/2023     ADVANCE CARE PLANNING  10/12/2026     PHQ-2 (once per calendar year)  Completed     Pneumococcal Vaccine: 65+ Years  Completed     IPV IMMUNIZATION  Aged Out     MENINGITIS IMMUNIZATION  Aged Out     Lab work is in process  Labs reviewed in EPIC          Review of Systems   HENT: Positive for congestion, nosebleeds and trouble swallowing.    Eyes: Negative.    Respiratory: Positive for shortness of breath.    Cardiovascular: Negative.    Gastrointestinal: Positive for diarrhea.   Endocrine: Positive for cold intolerance.   Skin: Positive for rash.   Neurological: Negative.    Hematological: Bruises/bleeds easily.   Psychiatric/Behavioral: Negative.          OBJECTIVE:   /66 (BP Location: Right arm, Patient Position: Sitting, Cuff Size: Adult Regular)   Pulse 55   Temp 97.6  F (36.4  C) (Oral)   Resp 16   Ht 1.803 m (5' 11\")   Wt 92.7 kg (204 lb 4.8 oz)   SpO2 100%   BMI 28.49 kg/m   Estimated body mass index is 28.49 kg/m  as calculated from the following:    Height as of this encounter: 1.803 m (5' 11\").    Weight as of this encounter: 92.7 kg (204 lb 4.8 oz).  Physical Exam  GENERAL: healthy, alert and no distress  EYES: Eyes grossly normal to inspection, PERRL and conjunctivae and sclerae normal  HENT: ear canals and TM's normal, nose and mouth without ulcers or lesions  NECK: no adenopathy, no asymmetry, masses, or scars and thyroid normal to palpation  RESP: lungs clear to auscultation - no rales, rhonchi or wheezes  CV: regular rate and rhythm, normal S1 S2, no S3 or S4, no murmur, click or rub, no peripheral edema and peripheral pulses strong  MS: +2 pitting edema to BLE  SKIN: Bilateral forearm with scaling plaques with evidence of scabbing from itch.   PSYCH: mentation appears normal, affect normal/bright    Diagnostic Test Results:  Labs reviewed in Epic    ASSESSMENT / PLAN:   Issac was seen today for wellness visit, imm/inj and imm/inj.    Diagnoses and " all orders for this visit:    Encounter for Medicare annual wellness exam  Defers Tdap and zoster.     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 No residual.     Stage 3 chronic kidney disease, unspecified whether stage 3a or 3b CKD (H)  -     Albumin Random Urine Quantitative with Creat Ratio; Future  -     BASIC METABOLIC PANEL; Future  -     CBC with platelets; Future  -     Albumin Random Urine Quantitative with Creat Ratio  -     BASIC METABOLIC PANEL  -     CBC with platelets  BMP pending. On ACEI. Blood pressure controlled.     Chronic atrial fibrillation (H)  Thoracic aortic ectasia (H)  Permanent atrial fibrillation (H)  Coronary artery disease without angina pectoris, unspecified vessel or lesion type, unspecified whether native or transplanted heart  Essential hypertension with goal blood pressure less than 140/90  Stenosis of left carotid artery  -     Lipid panel reflex to direct LDL Non-fasting; Future  -     Lipid panel reflex to direct LDL Non-fasting  Stable. Anticoagulated, on beta blocker. Follows with Cardiology, follow-up 2/2023.     Advanced directives, counseling/discussion  Discussed. Declines completion of directive as has verbal plan in place with their medical POA (son.) Discussed benefit of documentation.     Dysphagia, unspecified type  Reporting mild difficulty with swallowing since molar extraction. No choking or coughing with solids or liquids. Declines swallow study. Will follow-up if changing or worsening.     DOMINGO (obstructive sleep apnea)  Awaiting new CPAP given recall.     Peripheral polyneuropathy  Stable.     Localized edema  Not currently taking furosemide as giving him increased urination. Discussed importance of medication. Agreed to resume at every other day use at this time.     Chronic rhinitis  -     fluticasone (FLONASE) 50 MCG/ACT nasal spray; Spray 1 spray into both nostrils daily  Trial flonase.  "    Eczema, unspecified type  -     triamcinolone (KENALOG) 0.1 % external cream; Apply topically 2 times daily  Rash consistent with psoriatic exacerbation. Trial of optical steroid. Discussed medication use, risks, benefits, and side effects.      Need for prophylactic vaccination and inoculation against influenza  -     INFLUENZA, QUAD, HIGH DOSE, PF, 65YR + (FLUZONE HD)    High priority for 2019-nCoV vaccine  -     COVID-19,PF,PFIZER BOOSTER BIVALENT 12+Yrs    Other orders  -     REVIEW OF HEALTH MAINTENANCE PROTOCOL ORDERS        Patient has been advised of split billing requirements and indicates understanding: Yes      COUNSELING:  Reviewed preventive health counseling, as reflected in patient instructions    Estimated body mass index is 28.49 kg/m  as calculated from the following:    Height as of this encounter: 1.803 m (5' 11\").    Weight as of this encounter: 92.7 kg (204 lb 4.8 oz).    Weight management plan: Discussed healthy diet and exercise guidelines    He reports that he quit smoking about 57 years ago. His smoking use included cigarettes. He has never used smokeless tobacco.      Appropriate preventive services were discussed with this patient, including applicable screening as appropriate for cardiovascular disease, diabetes, osteopenia/osteoporosis, and glaucoma.  As appropriate for age/gender, discussed screening for colorectal cancer, prostate cancer, breast cancer, and cervical cancer. Checklist reviewing preventive services available has been given to the patient.    Reviewed patients plan of care and provided an AVS. The Intermediate Care Plan ( asthma action plan, low back pain action plan, and migraine action plan) for Issac meets the Care Plan requirement. This Care Plan has been established and reviewed with the Patient and spouse.    Counseling Resources:  ATP IV Guidelines  Pooled Cohorts Equation Calculator  Breast Cancer Risk Calculator  Breast Cancer: Medication to Reduce " Risk  FRAX Risk Assessment  ICSI Preventive Guidelines  Dietary Guidelines for Americans, 2010  USDA's MyPlate  ASA Prophylaxis  Lung CA Screening    DARREN Pederson CNP  [facility]    Identified Health Risks:

## 2022-11-18 ENCOUNTER — HOSPITAL ENCOUNTER (OUTPATIENT)
Dept: ULTRASOUND IMAGING | Facility: CLINIC | Age: 87
Discharge: HOME | End: 2022-11-18
Attending: INTERNAL MEDICINE | Admitting: INTERNAL MEDICINE
Payer: COMMERCIAL

## 2022-11-18 DIAGNOSIS — I65.22 STENOSIS OF LEFT CAROTID ARTERY: ICD-10-CM

## 2022-11-18 PROCEDURE — 93880 EXTRACRANIAL BILAT STUDY: CPT

## 2022-12-28 DIAGNOSIS — I10 ESSENTIAL HYPERTENSION WITH GOAL BLOOD PRESSURE LESS THAN 140/90: ICD-10-CM

## 2022-12-28 DIAGNOSIS — I10 ESSENTIAL HYPERTENSION, BENIGN: ICD-10-CM

## 2022-12-28 DIAGNOSIS — I48.20 CHRONIC ATRIAL FIBRILLATION (H): ICD-10-CM

## 2022-12-28 DIAGNOSIS — G47.33 OSA (OBSTRUCTIVE SLEEP APNEA): ICD-10-CM

## 2022-12-28 DIAGNOSIS — I10 HYPERTENSION GOAL BP (BLOOD PRESSURE) < 140/90: ICD-10-CM

## 2022-12-28 DIAGNOSIS — E78.5 HYPERLIPIDEMIA LDL GOAL <100: ICD-10-CM

## 2022-12-28 DIAGNOSIS — I65.22 STENOSIS OF LEFT CAROTID ARTERY: ICD-10-CM

## 2022-12-28 RX ORDER — ATORVASTATIN CALCIUM 20 MG/1
20 TABLET, FILM COATED ORAL DAILY
Qty: 90 TABLET | Refills: 0 | Status: SHIPPED | OUTPATIENT
Start: 2022-12-28 | End: 2023-02-01

## 2022-12-28 RX ORDER — CLOPIDOGREL BISULFATE 75 MG/1
75 TABLET ORAL DAILY
Qty: 90 TABLET | Refills: 0 | Status: SHIPPED | OUTPATIENT
Start: 2022-12-28 | End: 2023-02-01

## 2022-12-28 RX ORDER — ATENOLOL 50 MG/1
TABLET ORAL
Qty: 90 TABLET | Refills: 0 | Status: SHIPPED | OUTPATIENT
Start: 2022-12-28 | End: 2023-02-01

## 2022-12-28 RX ORDER — LISINOPRIL 5 MG/1
5 TABLET ORAL DAILY
Qty: 90 TABLET | Refills: 0 | Status: SHIPPED | OUTPATIENT
Start: 2022-12-28 | End: 2023-02-01

## 2023-01-30 NOTE — PROGRESS NOTES
CARDIOLOGY VISIT    REASON FOR VISIT: afib    SUBJECTIVE:  88-year-old male seen for follow-up of chronic atrial fibrillation.  He also has hypertension, TIA, carotid artery disease (left ICA stent in 2007), neuropathy, and sleep apnea.       He was diagnosed with A. fib in 2013 at the time of colonoscopy.  He has been on Xarelto 15mg daily (lower dose) and also because of his carotid disease, clopidogrel daily.       Echo August 2019 (afib) showed EF 60%, mild RV dilation with normal function, 1+ TR, RVSP 31 mmHg, sinus of Valsalva 3.8 cm, ascending aorta 4.2 cm.        Carotid ultrasound November 2020 showed less than 50% stenosis bilaterally, left carotid stent patent.    Carotid ultrasound November 2022 showed less than 50% stenosis of the right, patent left carotid stent with no restenosis.    He has been feeling well recently.  They do not go out much out of fear for slipping on the ice and avoiding crowds.  He has no palpitations, chest pain, or lower extremity edema.    MEDICATIONS:  Current Outpatient Medications   Medication     atenolol (TENORMIN) 50 MG tablet     atorvastatin (LIPITOR) 20 MG tablet     B-12 OR     CENTRUM OR     clopidogrel (PLAVIX) 75 MG tablet     fluticasone (FLONASE) 50 MCG/ACT nasal spray     furosemide (LASIX) 20 MG tablet     lisinopril (ZESTRIL) 5 MG tablet     omega 3 1000 MG CAPS     rivaroxaban ANTICOAGULANT (XARELTO) 15 MG TABS tablet     triamcinolone (KENALOG) 0.1 % external cream     No current facility-administered medications for this visit.       ALLERGIES:  Allergies   Allergen Reactions     Epinephrine Shortness Of Breath     Very rapid heartrate     Dabigatran Itching       REVIEW OF SYSTEMS:  Constitutional:  No weight loss, fever, chills  HEENT:  Eyes:  No visual loss, blurred vision, double vision or yellow sclerae. No hearing loss, sneezing, congestion, runny nose or sore throat.  Skin:  No rash or itching.  Cardiovascular: per HPI  Respiratory: per HPI  GI:  No  "anorexia, nausea, vomiting or diarrhea. No abdominal pain or blood.  :  No dysurea, hematuria  Neurologic:  No headache, paralysis, ataxia, numbness or tingling in the extremities. No change in bowel or bladder control.  Musculoskeletal:  No muscle pain  Hematologic:  No bleeding or bruising.  Lymphatics:  No enlarged nodes. No history of splenectomy.  Endocrine:  No reports of sweating, cold or heat intolerance. No polyuria or polydipsia.  Allergies:  No history of asthma, hives, eczema or rhinitis.    PHYSICAL EXAM:  /58   Pulse 61   Ht 1.803 m (5' 11\")   Wt 89.9 kg (198 lb 4.8 oz)   SpO2 100%   BMI 27.66 kg/m      Constitutional: awake, alert, no distress  Eyes: PERRL, sclera nonicteric  ENT: trachea midline  Respiratory: Lungs clear  Cardiovascular: Regular rate, irregular rhythm  GI: nondistended, nontender, bowel sounds present  Lymph/Hematologic: no lymphadenopathy  Skin: dry, no rash  Musculoskeletal: good muscle tone, strength 5/5 in upper and lower extremities  Neurologic: no focal deficits  Neuropsychiatric: appropriate affact    DATA:  Lab: November 2022: Potassium 4.6, creatinine 1.3  Recent Labs   Lab Test 11/08/22  1049 10/01/21  0841 07/11/16  1110 06/29/15  1142   CHOL 142 110   < > 101   HDL 46 30*   < > 38*   LDL 65 42   < > 35   TRIG 153* 192*   < > 138   CHOLHDLRATIO  --   --   --  2.7    < > = values in this interval not displayed.     ASSESSMENT:  88-year-old male seen for chronic A-fib.  He has no concerning symptoms.  Heart rate is well controlled.  Medications to be kept the same.  His carotid ultrasound showed no new stenosis.    With A-fib, neuropathy, and CKD, could consider evaluation for amyloid at some point.  However he has no heart failure type symptoms at this time.    RECOMMENDATIONS:  1.  Chronic A-fib  -Continue Xarelto and atenolol  -Echo in 1 year    2.  History of TIA with left carotid stent  -Continue clopidogrel, could switch to aspirin if any bleeding issues " in the future  -Carotid ultrasound every 2 years, next in 2024    Follow-up in 1 year with echo.    Ventura Shields MD  Cardiology - [facility]  Pager:  [phone]  Text Page  February 1, 2023

## 2023-02-01 ENCOUNTER — OFFICE VISIT (OUTPATIENT)
Dept: CARDIOLOGY | Facility: CLINIC | Age: 88
End: 2023-02-01
Payer: COMMERCIAL

## 2023-02-01 VITALS
DIASTOLIC BLOOD PRESSURE: 58 MMHG | HEIGHT: 71 IN | WEIGHT: 198.3 LBS | BODY MASS INDEX: 27.76 KG/M2 | SYSTOLIC BLOOD PRESSURE: 100 MMHG | OXYGEN SATURATION: 100 % | HEART RATE: 61 BPM

## 2023-02-01 DIAGNOSIS — G47.33 OSA (OBSTRUCTIVE SLEEP APNEA): ICD-10-CM

## 2023-02-01 DIAGNOSIS — I65.22 STENOSIS OF LEFT CAROTID ARTERY: ICD-10-CM

## 2023-02-01 DIAGNOSIS — E78.5 HYPERLIPIDEMIA LDL GOAL <100: ICD-10-CM

## 2023-02-01 DIAGNOSIS — I10 ESSENTIAL HYPERTENSION, BENIGN: ICD-10-CM

## 2023-02-01 DIAGNOSIS — I48.20 CHRONIC ATRIAL FIBRILLATION (H): ICD-10-CM

## 2023-02-01 DIAGNOSIS — I10 ESSENTIAL HYPERTENSION WITH GOAL BLOOD PRESSURE LESS THAN 140/90: ICD-10-CM

## 2023-02-01 DIAGNOSIS — I10 HYPERTENSION GOAL BP (BLOOD PRESSURE) < 140/90: ICD-10-CM

## 2023-02-01 PROCEDURE — 99214 OFFICE O/P EST MOD 30 MIN: CPT | Performed by: INTERNAL MEDICINE

## 2023-02-01 RX ORDER — ATENOLOL 50 MG/1
TABLET ORAL
Qty: 90 TABLET | Refills: 3 | Status: SHIPPED | OUTPATIENT
Start: 2023-02-01 | End: 2024-02-19

## 2023-02-01 RX ORDER — FUROSEMIDE 20 MG
20 TABLET ORAL DAILY
Qty: 90 TABLET | Refills: 3 | Status: SHIPPED | OUTPATIENT
Start: 2023-02-01 | End: 2024-02-19

## 2023-02-01 RX ORDER — CLOPIDOGREL BISULFATE 75 MG/1
75 TABLET ORAL DAILY
Qty: 90 TABLET | Refills: 3 | Status: SHIPPED | OUTPATIENT
Start: 2023-02-01 | End: 2024-02-19

## 2023-02-01 RX ORDER — LISINOPRIL 5 MG/1
5 TABLET ORAL DAILY
Qty: 90 TABLET | Refills: 3 | Status: SHIPPED | OUTPATIENT
Start: 2023-02-01 | End: 2023-09-18

## 2023-02-01 RX ORDER — ATORVASTATIN CALCIUM 20 MG/1
20 TABLET, FILM COATED ORAL DAILY
Qty: 90 TABLET | Refills: 3 | Status: SHIPPED | OUTPATIENT
Start: 2023-02-01 | End: 2024-02-19

## 2023-02-01 NOTE — LETTER
Date:February 2, 2023      Provider requested that no letter be sent. Do not send.       [facility]

## 2023-02-01 NOTE — LETTER
2/1/2023    Physician No Ref-Primary  No address on file    RE: Issac Zelaya       Dear Colleague,     I had the pleasure of seeing Issac Zelaya in the [facility].  CARDIOLOGY VISIT    REASON FOR VISIT: afib    SUBJECTIVE:  88-year-old male seen for follow-up of chronic atrial fibrillation.  He also has hypertension, TIA, carotid artery disease (left ICA stent in 2007), neuropathy, and sleep apnea.       He was diagnosed with A. fib in 2013 at the time of colonoscopy.  He has been on Xarelto 15mg daily (lower dose) and also because of his carotid disease, clopidogrel daily.       Echo August 2019 (afib) showed EF 60%, mild RV dilation with normal function, 1+ TR, RVSP 31 mmHg, sinus of Valsalva 3.8 cm, ascending aorta 4.2 cm.        Carotid ultrasound November 2020 showed less than 50% stenosis bilaterally, left carotid stent patent.    Carotid ultrasound November 2022 showed less than 50% stenosis of the right, patent left carotid stent with no restenosis.    He has been feeling well recently.  They do not go out much out of fear for slipping on the ice and avoiding crowds.  He has no palpitations, chest pain, or lower extremity edema.    MEDICATIONS:  Current Outpatient Medications   Medication     atenolol (TENORMIN) 50 MG tablet     atorvastatin (LIPITOR) 20 MG tablet     B-12 OR     CENTRUM OR     clopidogrel (PLAVIX) 75 MG tablet     fluticasone (FLONASE) 50 MCG/ACT nasal spray     furosemide (LASIX) 20 MG tablet     lisinopril (ZESTRIL) 5 MG tablet     omega 3 1000 MG CAPS     rivaroxaban ANTICOAGULANT (XARELTO) 15 MG TABS tablet     triamcinolone (KENALOG) 0.1 % external cream     No current facility-administered medications for this visit.       ALLERGIES:  Allergies   Allergen Reactions     Epinephrine Shortness Of Breath     Very rapid heartrate     Dabigatran Itching       REVIEW OF SYSTEMS:  Constitutional:  No weight loss, fever, chills  HEENT:  Eyes:  No visual loss, blurred  "vision, double vision or yellow sclerae. No hearing loss, sneezing, congestion, runny nose or sore throat.  Skin:  No rash or itching.  Cardiovascular: per HPI  Respiratory: per HPI  GI:  No anorexia, nausea, vomiting or diarrhea. No abdominal pain or blood.  :  No dysurea, hematuria  Neurologic:  No headache, paralysis, ataxia, numbness or tingling in the extremities. No change in bowel or bladder control.  Musculoskeletal:  No muscle pain  Hematologic:  No bleeding or bruising.  Lymphatics:  No enlarged nodes. No history of splenectomy.  Endocrine:  No reports of sweating, cold or heat intolerance. No polyuria or polydipsia.  Allergies:  No history of asthma, hives, eczema or rhinitis.    PHYSICAL EXAM:  /58   Pulse 61   Ht 1.803 m (5' 11\")   Wt 89.9 kg (198 lb 4.8 oz)   SpO2 100%   BMI 27.66 kg/m      Constitutional: awake, alert, no distress  Eyes: PERRL, sclera nonicteric  ENT: trachea midline  Respiratory: Lungs clear  Cardiovascular: Regular rate, irregular rhythm  GI: nondistended, nontender, bowel sounds present  Lymph/Hematologic: no lymphadenopathy  Skin: dry, no rash  Musculoskeletal: good muscle tone, strength 5/5 in upper and lower extremities  Neurologic: no focal deficits  Neuropsychiatric: appropriate affact    DATA:  Lab: November 2022: Potassium 4.6, creatinine 1.3  Recent Labs   Lab Test 11/08/22  1049 10/01/21  0841 07/11/16  1110 06/29/15  1142   CHOL 142 110   < > 101   HDL 46 30*   < > 38*   LDL 65 42   < > 35   TRIG 153* 192*   < > 138   CHOLHDLRATIO  --   --   --  2.7    < > = values in this interval not displayed.     ASSESSMENT:  88-year-old male seen for chronic A-fib.  He has no concerning symptoms.  Heart rate is well controlled.  Medications to be kept the same.  His carotid ultrasound showed no new stenosis.    With A-fib, neuropathy, and CKD, could consider evaluation for amyloid at some point.  However he has no heart failure type symptoms at this " time.    RECOMMENDATIONS:  1.  Chronic A-fib  -Continue Xarelto and atenolol  -Echo in 1 year    2.  History of TIA with left carotid stent  -Continue clopidogrel, could switch to aspirin if any bleeding issues in the future  -Carotid ultrasound every 2 years, next in 2024    Follow-up in 1 year with echo.    Ventura Shields MD  Cardiology - [facility]  Pager:  [phone]  Text Page  February 1, 2023          Thank you for allowing me to participate in the care of your patient.      Sincerely,     Ventura Shields MD     [facility]  cc:   Referred Self, MD  No address on file

## 2023-02-23 ENCOUNTER — APPOINTMENT (OUTPATIENT)
Dept: CT IMAGING | Facility: CLINIC | Age: 88
DRG: 177 | End: 2023-02-23
Attending: EMERGENCY MEDICINE
Payer: COMMERCIAL

## 2023-02-23 ENCOUNTER — APPOINTMENT (OUTPATIENT)
Dept: GENERAL RADIOLOGY | Facility: CLINIC | Age: 88
DRG: 177 | End: 2023-02-23
Attending: EMERGENCY MEDICINE
Payer: COMMERCIAL

## 2023-02-23 ENCOUNTER — HOSPITAL ENCOUNTER (INPATIENT)
Facility: CLINIC | Age: 88
LOS: 6 days | Discharge: HOME | DRG: 177 | End: 2023-03-03
Attending: EMERGENCY MEDICINE | Admitting: HOSPITALIST
Payer: COMMERCIAL

## 2023-02-23 DIAGNOSIS — M62.81 GENERALIZED MUSCLE WEAKNESS: ICD-10-CM

## 2023-02-23 DIAGNOSIS — I48.20 CHRONIC A-FIB (H): ICD-10-CM

## 2023-02-23 DIAGNOSIS — U07.1 INFECTION DUE TO 2019 NOVEL CORONAVIRUS: ICD-10-CM

## 2023-02-23 LAB
ALBUMIN SERPL BCG-MCNC: 3.7 G/DL (ref 3.5–5.2)
ALBUMIN UR-MCNC: 10 MG/DL
ALP SERPL-CCNC: 51 U/L (ref 40–129)
ALT SERPL W P-5'-P-CCNC: 9 U/L (ref 10–50)
ANION GAP SERPL CALCULATED.3IONS-SCNC: 11 MMOL/L (ref 7–15)
APPEARANCE UR: CLEAR
AST SERPL W P-5'-P-CCNC: 16 U/L (ref 10–50)
BASE EXCESS BLDV CALC-SCNC: -1.6 MMOL/L (ref -7.7–1.9)
BASOPHILS # BLD AUTO: 0 10E3/UL (ref 0–0.2)
BASOPHILS NFR BLD AUTO: 1 %
BILIRUB SERPL-MCNC: 0.8 MG/DL
BILIRUB UR QL STRIP: NEGATIVE
BUN SERPL-MCNC: 20 MG/DL (ref 8–23)
CALCIUM SERPL-MCNC: 8.8 MG/DL (ref 8.8–10.2)
CHLORIDE SERPL-SCNC: 104 MMOL/L (ref 98–107)
COLOR UR AUTO: ABNORMAL
CREAT SERPL-MCNC: 1.25 MG/DL (ref 0.67–1.17)
DEPRECATED HCO3 PLAS-SCNC: 23 MMOL/L (ref 22–29)
EOSINOPHIL # BLD AUTO: 0.1 10E3/UL (ref 0–0.7)
EOSINOPHIL NFR BLD AUTO: 1 %
ERYTHROCYTE [DISTWIDTH] IN BLOOD BY AUTOMATED COUNT: 13.3 % (ref 10–15)
FLUAV RNA SPEC QL NAA+PROBE: NEGATIVE
FLUBV RNA RESP QL NAA+PROBE: NEGATIVE
GFR SERPL CREATININE-BSD FRML MDRD: 56 ML/MIN/1.73M2
GLUCOSE SERPL-MCNC: 102 MG/DL (ref 70–99)
GLUCOSE UR STRIP-MCNC: NEGATIVE MG/DL
HCO3 BLDV-SCNC: 25 MMOL/L (ref 21–28)
HCT VFR BLD AUTO: 39.9 % (ref 40–53)
HGB BLD-MCNC: 13.3 G/DL (ref 13.3–17.7)
HGB UR QL STRIP: NEGATIVE
HOLD SPECIMEN: NORMAL
HYALINE CASTS: 6 /LPF
IMM GRANULOCYTES # BLD: 0 10E3/UL
IMM GRANULOCYTES NFR BLD: 0 %
KETONES UR STRIP-MCNC: NEGATIVE MG/DL
LEUKOCYTE ESTERASE UR QL STRIP: NEGATIVE
LIPASE SERPL-CCNC: 18 U/L (ref 13–60)
LYMPHOCYTES # BLD AUTO: 0.5 10E3/UL (ref 0.8–5.3)
LYMPHOCYTES NFR BLD AUTO: 11 %
MCH RBC QN AUTO: 33.3 PG (ref 26.5–33)
MCHC RBC AUTO-ENTMCNC: 33.3 G/DL (ref 31.5–36.5)
MCV RBC AUTO: 100 FL (ref 78–100)
MONOCYTES # BLD AUTO: 0.6 10E3/UL (ref 0–1.3)
MONOCYTES NFR BLD AUTO: 14 %
MUCOUS THREADS #/AREA URNS LPF: PRESENT /LPF
NEUTROPHILS # BLD AUTO: 3.3 10E3/UL (ref 1.6–8.3)
NEUTROPHILS NFR BLD AUTO: 73 %
NITRATE UR QL: NEGATIVE
NRBC # BLD AUTO: 0 10E3/UL
NRBC BLD AUTO-RTO: 0 /100
NT-PROBNP SERPL-MCNC: 4774 PG/ML (ref 0–1800)
O2/TOTAL GAS SETTING VFR VENT: 0 %
PCO2 BLDV: 46 MM HG (ref 40–50)
PH BLDV: 7.34 [PH] (ref 7.32–7.43)
PH UR STRIP: 5 [PH] (ref 5–7)
PLATELET # BLD AUTO: 149 10E3/UL (ref 150–450)
PO2 BLDV: 12 MM HG (ref 25–47)
POTASSIUM SERPL-SCNC: 4.3 MMOL/L (ref 3.4–5.3)
PROT SERPL-MCNC: 6.5 G/DL (ref 6.4–8.3)
RBC # BLD AUTO: 3.99 10E6/UL (ref 4.4–5.9)
RBC URINE: 1 /HPF
RSV RNA SPEC NAA+PROBE: NEGATIVE
SARS-COV-2 RNA RESP QL NAA+PROBE: POSITIVE
SODIUM SERPL-SCNC: 138 MMOL/L (ref 136–145)
SP GR UR STRIP: 1.02 (ref 1–1.03)
SQUAMOUS EPITHELIAL: <1 /HPF
TROPONIN T SERPL HS-MCNC: 19 NG/L
UROBILINOGEN UR STRIP-MCNC: NORMAL MG/DL
WBC # BLD AUTO: 4.5 10E3/UL (ref 4–11)
WBC URINE: 2 /HPF

## 2023-02-23 PROCEDURE — G0378 HOSPITAL OBSERVATION PER HR: HCPCS

## 2023-02-23 PROCEDURE — 250N000011 HC RX IP 250 OP 636: Performed by: EMERGENCY MEDICINE

## 2023-02-23 PROCEDURE — 83880 ASSAY OF NATRIURETIC PEPTIDE: CPT | Performed by: EMERGENCY MEDICINE

## 2023-02-23 PROCEDURE — 99285 EMERGENCY DEPT VISIT HI MDM: CPT | Mod: 25,CS

## 2023-02-23 PROCEDURE — 96375 TX/PRO/DX INJ NEW DRUG ADDON: CPT

## 2023-02-23 PROCEDURE — 84484 ASSAY OF TROPONIN QUANT: CPT | Performed by: EMERGENCY MEDICINE

## 2023-02-23 PROCEDURE — 74177 CT ABD & PELVIS W/CONTRAST: CPT

## 2023-02-23 PROCEDURE — 83690 ASSAY OF LIPASE: CPT | Performed by: EMERGENCY MEDICINE

## 2023-02-23 PROCEDURE — 99223 1ST HOSP IP/OBS HIGH 75: CPT | Mod: AI | Performed by: PHYSICIAN ASSISTANT

## 2023-02-23 PROCEDURE — C9803 HOPD COVID-19 SPEC COLLECT: HCPCS

## 2023-02-23 PROCEDURE — 36415 COLL VENOUS BLD VENIPUNCTURE: CPT | Performed by: EMERGENCY MEDICINE

## 2023-02-23 PROCEDURE — 85025 COMPLETE CBC W/AUTO DIFF WBC: CPT | Performed by: EMERGENCY MEDICINE

## 2023-02-23 PROCEDURE — 80053 COMPREHEN METABOLIC PANEL: CPT | Performed by: EMERGENCY MEDICINE

## 2023-02-23 PROCEDURE — 250N000013 HC RX MED GY IP 250 OP 250 PS 637: Performed by: PHYSICIAN ASSISTANT

## 2023-02-23 PROCEDURE — 87637 SARSCOV2&INF A&B&RSV AMP PRB: CPT | Performed by: EMERGENCY MEDICINE

## 2023-02-23 PROCEDURE — 96374 THER/PROPH/DIAG INJ IV PUSH: CPT

## 2023-02-23 PROCEDURE — 81003 URINALYSIS AUTO W/O SCOPE: CPT | Performed by: EMERGENCY MEDICINE

## 2023-02-23 PROCEDURE — 87040 BLOOD CULTURE FOR BACTERIA: CPT | Performed by: EMERGENCY MEDICINE

## 2023-02-23 PROCEDURE — 51798 US URINE CAPACITY MEASURE: CPT

## 2023-02-23 PROCEDURE — 71046 X-RAY EXAM CHEST 2 VIEWS: CPT

## 2023-02-23 PROCEDURE — 82803 BLOOD GASES ANY COMBINATION: CPT | Performed by: EMERGENCY MEDICINE

## 2023-02-23 PROCEDURE — 93005 ELECTROCARDIOGRAM TRACING: CPT

## 2023-02-23 RX ORDER — AMOXICILLIN 250 MG
1 CAPSULE ORAL 2 TIMES DAILY
Status: DISCONTINUED | OUTPATIENT
Start: 2023-02-23 | End: 2023-02-25

## 2023-02-23 RX ORDER — ATENOLOL 25 MG/1
50 TABLET ORAL DAILY
Status: DISCONTINUED | OUTPATIENT
Start: 2023-02-24 | End: 2023-02-26

## 2023-02-23 RX ORDER — ONDANSETRON 2 MG/ML
4 INJECTION INTRAMUSCULAR; INTRAVENOUS EVERY 6 HOURS PRN
Status: DISCONTINUED | OUTPATIENT
Start: 2023-02-23 | End: 2023-02-25

## 2023-02-23 RX ORDER — ONDANSETRON 4 MG/1
4 TABLET, ORALLY DISINTEGRATING ORAL EVERY 6 HOURS PRN
Status: DISCONTINUED | OUTPATIENT
Start: 2023-02-23 | End: 2023-02-25

## 2023-02-23 RX ORDER — FUROSEMIDE 10 MG/ML
40 INJECTION INTRAMUSCULAR; INTRAVENOUS ONCE
Status: COMPLETED | OUTPATIENT
Start: 2023-02-23 | End: 2023-02-23

## 2023-02-23 RX ORDER — AMOXICILLIN 250 MG
2 CAPSULE ORAL 2 TIMES DAILY
Status: DISCONTINUED | OUTPATIENT
Start: 2023-02-23 | End: 2023-02-25

## 2023-02-23 RX ORDER — ACETAMINOPHEN 325 MG/1
650 TABLET ORAL EVERY 6 HOURS PRN
Status: DISCONTINUED | OUTPATIENT
Start: 2023-02-23 | End: 2023-03-03 | Stop reason: HOSPADM

## 2023-02-23 RX ORDER — ATORVASTATIN CALCIUM 20 MG/1
20 TABLET, FILM COATED ORAL EVERY EVENING
Status: DISCONTINUED | OUTPATIENT
Start: 2023-02-23 | End: 2023-03-03 | Stop reason: HOSPADM

## 2023-02-23 RX ORDER — CLOPIDOGREL BISULFATE 75 MG/1
75 TABLET ORAL DAILY
Status: DISCONTINUED | OUTPATIENT
Start: 2023-02-24 | End: 2023-03-03 | Stop reason: HOSPADM

## 2023-02-23 RX ORDER — ACETAMINOPHEN 650 MG/1
650 SUPPOSITORY RECTAL EVERY 6 HOURS PRN
Status: DISCONTINUED | OUTPATIENT
Start: 2023-02-23 | End: 2023-03-03 | Stop reason: HOSPADM

## 2023-02-23 RX ORDER — AMOXICILLIN 250 MG
2 CAPSULE ORAL 2 TIMES DAILY PRN
Status: DISCONTINUED | OUTPATIENT
Start: 2023-02-23 | End: 2023-03-03 | Stop reason: HOSPADM

## 2023-02-23 RX ORDER — MORPHINE SULFATE 4 MG/ML
4 INJECTION, SOLUTION INTRAMUSCULAR; INTRAVENOUS
Status: DISCONTINUED | OUTPATIENT
Start: 2023-02-23 | End: 2023-02-23

## 2023-02-23 RX ORDER — AMOXICILLIN 250 MG
1 CAPSULE ORAL 2 TIMES DAILY PRN
Status: DISCONTINUED | OUTPATIENT
Start: 2023-02-23 | End: 2023-03-03 | Stop reason: HOSPADM

## 2023-02-23 RX ORDER — LISINOPRIL 5 MG/1
5 TABLET ORAL DAILY
Status: DISCONTINUED | OUTPATIENT
Start: 2023-02-24 | End: 2023-03-03 | Stop reason: HOSPADM

## 2023-02-23 RX ORDER — IOPAMIDOL 755 MG/ML
500 INJECTION, SOLUTION INTRAVASCULAR ONCE
Status: COMPLETED | OUTPATIENT
Start: 2023-02-23 | End: 2023-02-23

## 2023-02-23 RX ADMIN — MORPHINE SULFATE 4 MG: 4 INJECTION, SOLUTION INTRAMUSCULAR; INTRAVENOUS at 16:16

## 2023-02-23 RX ADMIN — RIVAROXABAN 15 MG: 15 TABLET, FILM COATED ORAL at 22:05

## 2023-02-23 RX ADMIN — FUROSEMIDE 40 MG: 10 INJECTION, SOLUTION INTRAMUSCULAR; INTRAVENOUS at 18:51

## 2023-02-23 RX ADMIN — IOPAMIDOL 100 ML: 755 INJECTION, SOLUTION INTRAVENOUS at 16:31

## 2023-02-23 RX ADMIN — ATORVASTATIN CALCIUM 20 MG: 20 TABLET, FILM COATED ORAL at 22:04

## 2023-02-23 ASSESSMENT — ENCOUNTER SYMPTOMS
FEVER: 0
SHORTNESS OF BREATH: 1
COUGH: 1
PALPITATIONS: 0
WHEEZING: 0
ABDOMINAL PAIN: 0
SORE THROAT: 0
DIARRHEA: 0
CHILLS: 0
FATIGUE: 1
WEAKNESS: 1
HEADACHES: 0
NAUSEA: 0
VOMITING: 0

## 2023-02-23 ASSESSMENT — ACTIVITIES OF DAILY LIVING (ADL)
ADLS_ACUITY_SCORE: 37

## 2023-02-23 NOTE — ED NOTES
[facility]  ED Nurse Handoff Report    Issac Zelaya is a 88 year old male   ED Chief complaint: Flu Symptoms and Generalized Weakness  . ED Diagnosis:   Final diagnoses:   None     Allergies:   Allergies   Allergen Reactions     Epinephrine Shortness Of Breath     Very rapid heartrate     Dabigatran Itching       Code Status: Full Code  Activity level - Baseline/Home:  Independent. Activity Level - Current:   Assist X 2. Lift room needed: No. Bariatric: No   Needed: No   Isolation: yes Infection: Not Applicable  COVID r/o and special precautions.     Vital Signs:   Vitals:    02/23/23 1417 02/23/23 1430 02/23/23 1432 02/23/23 1521   BP: 125/76 132/76 132/76    Pulse: 71 80 80    Resp:       Temp:       TempSrc:       SpO2: 94% 97% 96% 96%       Cardiac Rhythm:  ,   Cardiac  Cardiac Rhythm: Atrial fibrillation  Pain level:    Patient confused: No. Patient Falls Risk: Yes.   Elimination Status: Has voided   Patient Report - Initial Complaint: weakness. Focused Assessment: covid positive   Tests Performed:   Labs Ordered and Resulted from Time of ED Arrival to Time of ED Departure   COMPREHENSIVE METABOLIC PANEL - Abnormal       Result Value    Sodium 138      Potassium 4.3      Chloride 104      Carbon Dioxide (CO2) 23      Anion Gap 11      Urea Nitrogen 20.0      Creatinine 1.25 (*)     Calcium 8.8      Glucose 102 (*)     Alkaline Phosphatase 51      AST 16      ALT 9 (*)     Protein Total 6.5      Albumin 3.7      Bilirubin Total 0.8      GFR Estimate 56 (*)    BLOOD GAS VENOUS - Abnormal    pH Venous 7.34      pCO2 Venous 46      pO2 Venous 12 (*)     Bicarbonate Venous 25      Base Excess/Deficit (+/-) -1.6      FIO2 0     NT PROBNP INPATIENT - Abnormal    N terminal Pro BNP Inpatient 4,774 (*)    INFLUENZA A/B & SARS-COV2 PCR MULTIPLEX - Abnormal    Influenza A PCR Negative      Influenza B PCR Negative      RSV PCR Negative      SARS CoV2 PCR Positive (*)    CBC WITH PLATELETS AND  DIFFERENTIAL - Abnormal    WBC Count 4.5      RBC Count 3.99 (*)     Hemoglobin 13.3      Hematocrit 39.9 (*)           MCH 33.3 (*)     MCHC 33.3      RDW 13.3      Platelet Count 149 (*)     % Neutrophils 73      % Lymphocytes 11      % Monocytes 14      % Eosinophils 1      % Basophils 1      % Immature Granulocytes 0      NRBCs per 100 WBC 0      Absolute Neutrophils 3.3      Absolute Lymphocytes 0.5 (*)     Absolute Monocytes 0.6      Absolute Eosinophils 0.1      Absolute Basophils 0.0      Absolute Immature Granulocytes 0.0      Absolute NRBCs 0.0     LIPASE - Normal    Lipase 18     TROPONIN T, HIGH SENSITIVITY - Normal    Troponin T, High Sensitivity 19     ROUTINE UA WITH MICROSCOPIC REFLEX TO CULTURE   BLOOD CULTURE   BLOOD CULTURE    . Abnormal Results: see above   Treatments provided: pain control   Family Comments: family at bedside  OBS brochure/video discussed/provided to patient:  Yes  ED Medications:   Medications   furosemide (LASIX) injection 40 mg (has no administration in time range)   morphine (PF) injection 4 mg (has no administration in time range)     Drips infusing:  No  For the majority of the shift, the patient's behavior Green. Interventions performed were labs ct .    Sepsis treatment initiated: No     Patient tested for COVID 19 prior to admission: YES    ED Nurse Name/Phone Number: Jayleen Wilson RN,   4:06 PM    RECEIVING UNIT ED HANDOFF REVIEW    Above ED Nurse Handoff Report was reviewed: Yes  Reviewed by: Coretta Salvador RN on February 24, 2023 at 10:08 PM

## 2023-02-23 NOTE — ED TRIAGE NOTES
Patient arrives via ems , woke up today weak unable to ambulate around , made it to the kitchen and slumped over counter. Incontinent of stool , initially was hypotensive 80's and after 950cc of NS stable now 120's.

## 2023-02-24 ENCOUNTER — APPOINTMENT (OUTPATIENT)
Dept: PHYSICAL THERAPY | Facility: CLINIC | Age: 88
DRG: 177 | End: 2023-02-24
Attending: PHYSICIAN ASSISTANT
Payer: COMMERCIAL

## 2023-02-24 LAB
ANION GAP SERPL CALCULATED.3IONS-SCNC: 11 MMOL/L (ref 7–15)
ATRIAL RATE - MUSE: 72 BPM
BUN SERPL-MCNC: 19.9 MG/DL (ref 8–23)
CALCIUM SERPL-MCNC: 9.3 MG/DL (ref 8.8–10.2)
CHLORIDE SERPL-SCNC: 99 MMOL/L (ref 98–107)
CREAT SERPL-MCNC: 1.3 MG/DL (ref 0.67–1.17)
DEPRECATED HCO3 PLAS-SCNC: 25 MMOL/L (ref 22–29)
DIASTOLIC BLOOD PRESSURE - MUSE: NORMAL MMHG
ERYTHROCYTE [DISTWIDTH] IN BLOOD BY AUTOMATED COUNT: 13.3 % (ref 10–15)
GFR SERPL CREATININE-BSD FRML MDRD: 53 ML/MIN/1.73M2
GLUCOSE SERPL-MCNC: 88 MG/DL (ref 70–99)
HCT VFR BLD AUTO: 41.3 % (ref 40–53)
HGB BLD-MCNC: 13.7 G/DL (ref 13.3–17.7)
INTERPRETATION ECG - MUSE: NORMAL
LACTATE SERPL-SCNC: 0.9 MMOL/L (ref 0.7–2)
MCH RBC QN AUTO: 33.3 PG (ref 26.5–33)
MCHC RBC AUTO-ENTMCNC: 33.2 G/DL (ref 31.5–36.5)
MCV RBC AUTO: 100 FL (ref 78–100)
P AXIS - MUSE: NORMAL DEGREES
PLATELET # BLD AUTO: 136 10E3/UL (ref 150–450)
POTASSIUM SERPL-SCNC: 4.7 MMOL/L (ref 3.4–5.3)
PR INTERVAL - MUSE: NORMAL MS
QRS DURATION - MUSE: 122 MS
QT - MUSE: 398 MS
QTC - MUSE: 456 MS
R AXIS - MUSE: 101 DEGREES
RBC # BLD AUTO: 4.12 10E6/UL (ref 4.4–5.9)
SODIUM SERPL-SCNC: 135 MMOL/L (ref 136–145)
SYSTOLIC BLOOD PRESSURE - MUSE: NORMAL MMHG
T AXIS - MUSE: -22 DEGREES
VENTRICULAR RATE- MUSE: 79 BPM
WBC # BLD AUTO: 5.4 10E3/UL (ref 4–11)

## 2023-02-24 PROCEDURE — 36415 COLL VENOUS BLD VENIPUNCTURE: CPT | Performed by: EMERGENCY MEDICINE

## 2023-02-24 PROCEDURE — 250N000013 HC RX MED GY IP 250 OP 250 PS 637: Performed by: HOSPITALIST

## 2023-02-24 PROCEDURE — G0378 HOSPITAL OBSERVATION PER HR: HCPCS

## 2023-02-24 PROCEDURE — 99222 1ST HOSP IP/OBS MODERATE 55: CPT | Performed by: STUDENT IN AN ORGANIZED HEALTH CARE EDUCATION/TRAINING PROGRAM

## 2023-02-24 PROCEDURE — 97161 PT EVAL LOW COMPLEX 20 MIN: CPT | Mod: GP

## 2023-02-24 PROCEDURE — 250N000013 HC RX MED GY IP 250 OP 250 PS 637: Performed by: PHYSICIAN ASSISTANT

## 2023-02-24 PROCEDURE — 99233 SBSQ HOSP IP/OBS HIGH 50: CPT | Mod: CS | Performed by: HOSPITALIST

## 2023-02-24 PROCEDURE — 80048 BASIC METABOLIC PNL TOTAL CA: CPT | Performed by: PHYSICIAN ASSISTANT

## 2023-02-24 PROCEDURE — 36415 COLL VENOUS BLD VENIPUNCTURE: CPT | Performed by: PHYSICIAN ASSISTANT

## 2023-02-24 PROCEDURE — 97110 THERAPEUTIC EXERCISES: CPT | Mod: GP

## 2023-02-24 PROCEDURE — 250N000013 HC RX MED GY IP 250 OP 250 PS 637: Performed by: INTERNAL MEDICINE

## 2023-02-24 PROCEDURE — 83605 ASSAY OF LACTIC ACID: CPT | Performed by: EMERGENCY MEDICINE

## 2023-02-24 PROCEDURE — 85027 COMPLETE CBC AUTOMATED: CPT | Performed by: PHYSICIAN ASSISTANT

## 2023-02-24 RX ORDER — ALBUTEROL SULFATE 90 UG/1
2 AEROSOL, METERED RESPIRATORY (INHALATION) EVERY 6 HOURS PRN
Status: DISCONTINUED | OUTPATIENT
Start: 2023-02-24 | End: 2023-03-03 | Stop reason: HOSPADM

## 2023-02-24 RX ORDER — HYDROXYZINE HYDROCHLORIDE 10 MG/1
10 TABLET, FILM COATED ORAL EVERY 6 HOURS PRN
Status: DISCONTINUED | OUTPATIENT
Start: 2023-02-24 | End: 2023-03-03 | Stop reason: HOSPADM

## 2023-02-24 RX ADMIN — ACETAMINOPHEN 650 MG: 325 TABLET, FILM COATED ORAL at 23:56

## 2023-02-24 RX ADMIN — ACETAMINOPHEN 650 MG: 325 TABLET, FILM COATED ORAL at 16:54

## 2023-02-24 RX ADMIN — ALBUTEROL SULFATE 2 PUFF: 108 INHALANT RESPIRATORY (INHALATION) at 20:31

## 2023-02-24 RX ADMIN — HYDROXYZINE HYDROCHLORIDE 10 MG: 10 TABLET ORAL at 20:25

## 2023-02-24 RX ADMIN — ATENOLOL 50 MG: 25 TABLET ORAL at 07:52

## 2023-02-24 RX ADMIN — LISINOPRIL 5 MG: 5 TABLET ORAL at 09:34

## 2023-02-24 RX ADMIN — SENNOSIDES AND DOCUSATE SODIUM 1 TABLET: 50; 8.6 TABLET ORAL at 20:25

## 2023-02-24 RX ADMIN — ACETAMINOPHEN 650 MG: 325 TABLET, FILM COATED ORAL at 03:21

## 2023-02-24 RX ADMIN — ACETAMINOPHEN 650 MG: 325 TABLET, FILM COATED ORAL at 09:51

## 2023-02-24 RX ADMIN — ATORVASTATIN CALCIUM 20 MG: 20 TABLET, FILM COATED ORAL at 20:25

## 2023-02-24 ASSESSMENT — ACTIVITIES OF DAILY LIVING (ADL)
ADLS_ACUITY_SCORE: 37

## 2023-02-24 NOTE — PROGRESS NOTES
"1140   Pt. given Tylenol at 955 for 8/10 pain \"everywhere\" especially in groin/lower abdomen. Bleeding from tip of penis- clots present. Denies pain with urination. Plavix held per MD order. Pt.'s temp spiked to102.1 at 1130. MD paged. Cool rags applied to body. Urology consult put in. Will continue to provide supportive cares.     1300  Pt's temp 99.5. Will continue to monitor.    1630  Pt's temp 101.1, tylenol given. Pt complains of body aches.       "

## 2023-02-24 NOTE — PHARMACY-ADMISSION MEDICATION HISTORY
Admission medication history interview status for this patient is complete. See [facility] admission navigator for allergy information, prior to admission medications and immunization status.     Medication history interview done, indicate source(s): Chris valiente- [phone]   Medication history resources (including written lists, pill bottles, clinic record):None      Changes made to PTA medication list:  Added: none  Changed: triamcinolone cream to prn  Reported as Not Taking: none  Removed: centrum and Fish Oil    Actions taken by pharmacist (provider contacted, etc):None     Additional medication history information:None    Medication reconciliation/reorder completed by provider prior to medication history?  n   (Y/N)         Prior to Admission medications    Medication Sig Last Dose Taking? Auth Provider Long Term End Date   atenolol (TENORMIN) 50 MG tablet 1 tab in morning. 2/22/2023 Yes Ventura Shields MD Yes    atorvastatin (LIPITOR) 20 MG tablet Take 1 tablet (20 mg) by mouth daily 2/22/2023 Yes Ventura Shields MD Yes    B-12 OR 1000 mg daily 2/22/2023 Yes Reported, Patient     clopidogrel (PLAVIX) 75 MG tablet Take 1 tablet (75 mg) by mouth daily 2/22/2023 Yes Ventura Shields MD Yes    fluticasone (FLONASE) 50 MCG/ACT nasal spray Spray 1 spray into both nostrils daily 2/22/2023 Yes Felicita Hidalgo APRN CNP     furosemide (LASIX) 20 MG tablet Take 1 tablet (20 mg) by mouth daily 2/22/2023 Yes Ventura Shields MD Yes    lisinopril (ZESTRIL) 5 MG tablet Take 1 tablet (5 mg) by mouth daily 2/22/2023 Yes Ventura Shields MD Yes    rivaroxaban ANTICOAGULANT (XARELTO) 15 MG TABS tablet Take 1 tablet (15 mg) by mouth daily (with dinner) 2/22/2023 Yes Ventura Shields MD Yes    triamcinolone (KENALOG) 0.1 % external cream Apply topically 2 times daily  Patient taking differently: Apply topically 2 times daily as needed  Yes Felicita Hidalgo APRN CNP

## 2023-02-24 NOTE — PROGRESS NOTES
"   02/24/23 1011   Appointment Info   Signing Clinician's Name / Credentials (PT) Malcolm Dunham DPT   Living Environment   People in Home spouse   Current Living Arrangements house  (town home)   Home Accessibility no concerns   Transportation Anticipated family or friend will provide   Living Environment Comments Pt lives in single story town home, threshold step to get inside, everything on 1 level. Pt's wife unable to provide physical assistance per pt.   Self-Care   Usual Activity Tolerance good   Current Activity Tolerance poor   Regular Exercise No   Equipment Currently Used at Home cane, straight   Fall history within last six months no   Activity/Exercise/Self-Care Comment Pt is IND with functional mobility at baseline, uses SEC outside of home, no AD inside home. Does not own other ADs.   General Information   Onset of Illness/Injury or Date of Surgery 02/23/23   Referring Physician Marta Devries PA-C   Patient/Family Therapy Goals Statement (PT) get stronger, decrease pain   Pertinent History of Current Problem (include personal factors and/or comorbidities that impact the POC) Pt is 89 yo male who, per chart, \"history of chronic atrial fibrillation on Xarelto for anticoagulation, hypertension, carotid artery stenosis, hyperlipidemia, obstructive sleep apnea, hypertension, chronic kidney disease and obesity who presents with significant fatigue, body aches and generalized weakness for the last 1-2 days.\"   Existing Precautions/Restrictions fall   Cognition   Affect/Mental Status (Cognition) WFL   Orientation Status (Cognition) oriented x 4   Pain Assessment   Patient Currently in Pain   (pain in B anterior thighs, goes into groin area 8/10)   Integumentary/Edema   Integumentary/Edema Comments bleeding from urethra, observed upon entry and at end of session, nurse updated and aware.   Posture    Posture Protracted shoulders;Forward head position   Range of Motion (ROM)   Range of Motion ROM is " WFL   ROM Comment appears to be WFL, limitations due to pain in L LE (decreased hip flexion, knee flexion)   Bed Mobility   Bed Mobility rolling right   Comment, (Bed Mobility) supine rolling R with Isaura, increased pain.   Transfers   Comment, (Transfers) unable to perform on this date. Pt declining due to pain   Gait/Stairs (Locomotion)   Comment, (Gait/Stairs) unable to perform on this date. Pt declining due to pain   Balance   Balance Comments unable to assess.   Sensory Examination   Sensory Perception Comments hx of peripheral neuropathy, decreased light touch per screen in B feet into lower leg, B hands also.   Clinical Impression   Criteria for Skilled Therapeutic Intervention Yes, treatment indicated   PT Diagnosis (PT) impaired functional mobility   Influenced by the following impairments pain, decreased strength, decreased activity tolerance, impaired sensation.   Functional limitations due to impairments difficulty with all functional mob   Clinical Presentation (PT Evaluation Complexity) Evolving/Changing   Clinical Presentation Rationale clinical judgement, current presentation   Clinical Decision Making (Complexity) low complexity   Planned Therapy Interventions (PT) balance training;bed mobility training;gait training;ROM (range of motion);strengthening;stretching;transfer training;progressive activity/exercise   Anticipated Equipment Needs at Discharge (PT) walker, rolling   Risk & Benefits of therapy have been explained evaluation/treatment results reviewed;care plan/treatment goals reviewed;risks/benefits reviewed;current/potential barriers reviewed;participants voiced agreement with care plan;participants included;patient   PT Total Evaluation Time   PT Noy Low Complexity Minutes (78551) 15   Therapy Certification   Start of care date 02/23/23   Certification date from 02/23/23   Certification date to 03/03/23   Medical Diagnosis COVID-19+   Physical Therapy Goals   PT Frequency 5x/week   PT  Predicted Duration/Target Date for Goal Attainment 03/03/23   PT Goals Bed Mobility;Transfers;Gait   PT: Bed Mobility Independent;Supine to/from sit   PT: Transfers Supervision/stand-by assist;Sit to/from stand;Assistive device   PT: Gait Supervision/stand-by assist;Rolling walker;100 feet   PT Discharge Planning   PT Plan Progress all mob, assess supine<> sit, transfes and gait as able (pt very limited by pain during initial consult)   PT Discharge Recommendation (DC Rec) Transitional Care Facility   PT Rationale for DC Rec Pt at baseline is IND with functional mob, uses SEC for longer amb distance. Pt currently below baseline, very limited due to increased pain and weakness, pt declining OOB mobility on this date due to these symptoms. Anticipate, once pt's pain is better controled, he will progress quickly with PT. Difficult to fully assess on this date   PT Brief overview of current status supine rolling Isaura.   Total Session Time   Total Session Time (sum of timed and untimed services) 15       [facility]  OUTPATIENT PHYSICAL THERAPY EVALUATION  PLAN OF TREATMENT FOR OUTPATIENT REHABILITATION  (COMPLETE FOR INITIAL CLAIMS ONLY)  Patient's Last Name, First Name, M.I.  YOB: 1935  Issac Zelaya                        Provider's Name  [facility] Medical Record No.  [id number]                             Onset Date:  02/23/23   Start of Care Date:  02/23/23   Type:     _X_PT   ___OT   ___SLP Medical Diagnosis:  COVID-19+              PT Diagnosis:  impaired functional mobility Visits from SOC:  1     See note for plan of treatment, functional goals and certification details    I CERTIFY THE NEED FOR THESE SERVICES FURNISHED UNDER        THIS PLAN OF TREATMENT AND WHILE UNDER MY CARE     (Physician co-signature of this document indicates review and certification of the therapy plan).

## 2023-02-24 NOTE — CONSULTS
[facility] Consultation by [facility]    Issac Zelaya MRN# [id number]   Age: 88 year old YOB: 1935     Date of Admission:  2/23/2023    Reason for consult: Hematuria, passing clots       Requesting PA/MD: Dr. Finn       Level of consult: Consult, follow and place orders           Assessment and Plan:   Assessment:   Meatal bleeding  Gross hematuria  BPH  COVID 19  Weakness  Carotid stent  Afib  Chronic anticoagulation      Plan:   -Recommend holding Plavix and Xarelto as able.  -Check PVR after urination.  Would consider Garcia placement if retention and/or clot retention.  If for clot retention, would possibly benefit from CBI and Garcia catheter to traction.  -Will need outpatient cytoscopy.  -Recent CT imaging showed no large bladder tumors, no kidney masses, and a large prostate.  -Remainder per physician.    Summer Mann PA-C   [facility]  [phone]               Chief Complaint:   Hematuria, passing clots    History is obtained from the patient and EMR.         History of Present Illness:   This patient is a 88 year old male who presented to the emergency department yesterday afternoon due to flulike symptoms and generalized weakness.  His symptoms started that morning.  His wife has been feeling ill.  Patient endorses coughing, fever, chills, body aches, fatigue and weakness.  He was found to be COVID-positive.    CT imaging was obtained yesterday which does not show any large clots in the bladder.  It did show evidence of enlarged prostate.    It appears earlier today, patient was having lower abdominal and groin bleeding with presence of clots coming out of the meatus.  Patient denies ever having hematuria or urethral bleeding previously.  He does note at baseline that he wears depends as he does have some urinary incontinence.  In discussion with his nurse, she notes that he has had good urinary output and has been emptying.    Patient denies dysuria, or  significant frequency of urination.    Hemoglobin 13.7 (13.3).  WBC 5.4.  Patient is febrile with Tmax of 102.1.  No tachycardia.    He is a former smoker having smoked approximately 20 years, 1 pack/day.  He denies previously seeing urology.  Of note, patient is on Plavix and Xarelto.    On examination, he does have meatal bleeding with removal of a clot.            Past Medical History:     Past Medical History:   Diagnosis Date     Atrial fibrillation (H)      Carotid artery disease (H)      Coronary artery disease      Hypertension      Unspecified cerebral artery occlusion with cerebral infarction              Past Surgical History:     Past Surgical History:   Procedure Laterality Date     COLONOSCOPY  2013    Dr. Yas LAMB     [facility] BYPASS GRAFT OTHR,CAROTID      carotid stent             Social History:     Social History     Tobacco Use     Smoking status: Former     Types: Cigarettes     Quit date: 1965     Years since quittin.1     Smokeless tobacco: Never   Substance Use Topics     Alcohol use: Yes     Alcohol/week: 0.0 standard drinks     Comment: 2-3 drinks per week             Family History:     Family History   Problem Relation Age of Onset     Heart Disease Mother      Diabetes Father      No Known Problems Brother      No Known Problems Sister      No Known Problems Brother      No Known Problems Sister      Breast Cancer No family hx of    Denies  malignancy family history.             Allergies:     Allergies   Allergen Reactions     Epinephrine Shortness Of Breath     Very rapid heartrate     Dabigatran Itching             Medications:     Current Facility-Administered Medications   Medication     acetaminophen (TYLENOL) tablet 650 mg    Or     acetaminophen (TYLENOL) Suppository 650 mg     atenolol (TENORMIN) tablet 50 mg     atorvastatin (LIPITOR) tablet 20 mg     clopidogrel (PLAVIX) tablet 75 mg     hydrOXYzine (ATARAX) tablet 10 mg     lisinopril (ZESTRIL) tablet 5 mg      melatonin tablet 1 mg     ondansetron (ZOFRAN ODT) ODT tab 4 mg    Or     ondansetron (ZOFRAN) injection 4 mg     rivaroxaban ANTICOAGULANT (XARELTO) tablet 15 mg     senna-docusate (SENOKOT-S/PERICOLACE) 8.6-50 MG per tablet 1 tablet    Or     senna-docusate (SENOKOT-S/PERICOLACE) 8.6-50 MG per tablet 2 tablet     senna-docusate (SENOKOT-S/PERICOLACE) 8.6-50 MG per tablet 1 tablet    Or     senna-docusate (SENOKOT-S/PERICOLACE) 8.6-50 MG per tablet 2 tablet     Current Outpatient Medications   Medication Sig     atenolol (TENORMIN) 50 MG tablet 1 tab in morning.     atorvastatin (LIPITOR) 20 MG tablet Take 1 tablet (20 mg) by mouth daily     B-12 OR 1000 mg daily     clopidogrel (PLAVIX) 75 MG tablet Take 1 tablet (75 mg) by mouth daily     fluticasone (FLONASE) 50 MCG/ACT nasal spray Spray 1 spray into both nostrils daily     furosemide (LASIX) 20 MG tablet Take 1 tablet (20 mg) by mouth daily     lisinopril (ZESTRIL) 5 MG tablet Take 1 tablet (5 mg) by mouth daily     rivaroxaban ANTICOAGULANT (XARELTO) 15 MG TABS tablet Take 1 tablet (15 mg) by mouth daily (with dinner)     triamcinolone (KENALOG) 0.1 % external cream Apply topically 2 times daily (Patient taking differently: Apply topically 2 times daily as needed)             Review of Systems:   A comprehensive 10-point review of systems was performed and found to be negative except as described in the HPI.     BP (!) 123/112 (BP Location: Left arm, Patient Position: Semi-Hayes's)   Pulse 68   Temp 99.5  F (37.5  C) (Oral)   Resp 11   SpO2 98%   PSYCH: NAD, appears ill  EYES: EOMI  MOUTH: MMM  NECK: Supple, no notable adenopathy  RESP: Unlabored breathing  CARDIAC: No LE edema  SKIN: Warm, no rashes  ABD: soft, Nontender  NEURO: AAO x3  URO: Circumcised phallus. Blood oozing out of penile meatus with clot formation.  Nontender.          Data:     Lab Results   Component Value Date    WBC 5.4 02/24/2023    HGB 13.7 02/24/2023    HCT 41.3 02/24/2023    MCV  100 02/24/2023     (L) 02/24/2023     Lab Results   Component Value Date    CR 1.30 (H) 02/24/2023    CR 1.25 (H) 02/23/2023     Recent Labs   Lab 02/23/23  1642   COLOR Light Yellow   APPEARANCE Clear   URINEGLC Negative   URINEBILI Negative   URINEKETONE Negative   SG 1.024   URINEPH 5.0   PROTEIN 10*   NITRITE Negative   LEUKEST Negative   RBCU 1   WBCU 2     XR Chest 2 Views    Result Date: 2/23/2023  XR CHEST 2 VIEWS   2/23/2023 2:50 PM HISTORY: Shortness of breath COMPARISON: Chest radiograph 8/10/2018     IMPRESSION: No acute cardiopulmonary disease. SANCHEZ BACA MD   SYSTEM ID:  [id number]    CT Abdomen Pelvis w Contrast    Result Date: 2/23/2023  EXAM: CT ABDOMEN PELVIS W CONTRAST LOCATION: [facility] DATE/TIME: 2/23/2023 4:37 PM INDICATION: bilateral groin pain COMPARISON: None. TECHNIQUE: CT scan of the abdomen and pelvis was performed following injection of IV contrast. Multiplanar reformats were obtained. Dose reduction techniques were used. CONTRAST: 100mL Isovue 370 FINDINGS: LOWER CHEST: Mild bibasilar interstitial thickening with traction bronchiectasis is consistent with fibrosis. Biatrial enlargement. Coronary artery calcification. Small hiatal hernia. HEPATOBILIARY: A few subcentimeter hepatic hypodensities which are too small to characterize. PANCREAS: Normal. SPLEEN: A 2.4 cm peripherally calcified hypodense lesion in the spleen is consistent with a benign process, most likely prior infection. ADRENAL GLANDS: Normal. KIDNEYS/BLADDER: Bilateral cortical thinning. No hydronephrosis. BOWEL: Diverticulosis of the colon. No acute inflammatory change. No obstruction. LYMPH NODES: Normal. VASCULATURE: Moderate atherosclerosis. Mild stenosis of the origin of the superior mesenteric artery. 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 A saccular infrarenal abdominal aortic artery aneurysm measuring 3.2 x 2.9 cm, image 108:3. PELVIC ORGANS: Prostatomegaly measuring  5.4 cm in transverse dimension. MUSCULOSKELETAL: Moderate degenerative changes. Small bilateral fat-containing inguinal hernias, right greater than left.     IMPRESSION: 1.  Small fat-containing inguinal hernias. 2.  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 See guidelines below. 3.  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 4.  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 5.  Mild pulmonary fibrosis. REFERENCE: SVS practice guidelines on the care of patients with an abdominal aortic aneurysm. Carlota MATA. J Vasc Surg, 2018. PMID: 90240100. Aorta size: 3.0 cm to 3.9 cm: Surveillance imaging at 3-year intervals.

## 2023-02-24 NOTE — H&P
[facility]  Admission History and Physical Examination    NAME: Issac Zelaya   : 1935   MRN: [id number]     Date of Admission:  2023    Assessment & Plan   Issac Zelaya is a 88 year old male with a history of chronic atrial fibrillation on Xarelto for anticoagulation, hypertension, carotid artery stenosis, hyperlipidemia, obstructive sleep apnea, hypertension, chronic kidney disease and obesity who presents with significant fatigue, body aches and generalized weakness for the last 1-2 days.      Work up in the emergency room he was afebrile vital signs are stable.  EKG shows rate controlled atrial fibrillation.  Laboratory result was unremarkable with stable AUNG mild BNP elevation of 4774.  He had normal VBG with normal CBC.  Urinalysis was clear for infection but he did test positive for COVID, which is likely the cause of his weakness. CXR shows no PNA or edema.   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  He was recommended for admission for PT eval for safe disposition.     #Generalized weakness, myalgia due to COVID 19   Pt denies any fever, cough or respiratory sxs. No sick contact.  Vitals and labs are re-assuring. Main complaint is weakness and he normally is active and pt lives with wife in independent home. Concern that he is unable to get around at home.   Plan:  -  PT eval to assess for discharge needs   -  Medically stable at present, cont supportive cares and monitor for decompensation   -  SW eval for disposition needs     #Rate controlled AFib.   -  Stable, resume Xarelto, Atenolol   -  Last ECHO  shows preserved EF at 55-60%    #Elevated BNP  No e/o of acute heart failure.   CXR clear, Cont to monitor   - ok to resume lasix tomorrow if po intake improves     #HLP   -  Resume statin     #CVA/CAD   -  Stable resume PTA meds including   -  Resume Plavix      Awaiting formal pharmacy reconciliation to resume home medications.     DVT Prophylaxis: DOAC  Code Status: Full Code  COVID PCR TESTING STATUS: Negative           Expected Discharge Date: Ok to discharge when pt is able to be back at baseline mobility vs home Health vs tcu    Marta Devries PA-C    Primary Care Physician   Physician No Ref-Primary    Chief Complaint   Weakness, myalgia     History is obtained from the patient    Discussed with Dr. You in the ED, full chart review including lab work, imaging, and vital signs were reviewed.     History of Present Illness   Issac Zelaya is a 88 year old male with a history of chronic atrial fibrillation on Xarelto for anticoagulation, hypertension, carotid artery stenosis, hyperlipidemia, obstructive sleep apnea, hypertension, chronic kidney disease and obesity who presents with significant fatigue, body aches and generalized weakness.    Prior to the emergency room he was afebrile vital signs are stable.  EKG shows rate controlled atrial fibrillation.  Laboratory result was unremarkable with stable AUNG mild BNP elevation of 4774.  He had normal VBG with normal CBC.  Urinalysis was clear for infection but he did test positive for COVID, which is likely the cause of his weakness. CXR shows no PNA or edema.   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  He was recommended for admission for PT eval for safe disposition.     Past Medical History    I have reviewed this patient's medical history and updated it with pertinent information if needed.   Past Medical History:   Diagnosis Date     Atrial fibrillation (H)      Carotid artery disease (H)      Coronary artery disease      Hypertension      Unspecified cerebral artery occlusion with cerebral infarction        Past Surgical History   I have reviewed this patient's surgical history and updated it  with pertinent information if needed.  Past Surgical History:   Procedure Laterality Date     [facility]      carotid stent       Prior to Admission Medications   Prior to Admission Medications   Prescriptions Last Dose Informant Patient Reported? Taking?   B-12 OR   Yes No   Si mg daily   CENTRUM OR   Yes No   Sig: None Entered   atenolol (TENORMIN) 50 MG tablet   No No   Si tab in morning.   atorvastatin (LIPITOR) 20 MG tablet   No No   Sig: Take 1 tablet (20 mg) by mouth daily   clopidogrel (PLAVIX) 75 MG tablet   No No   Sig: Take 1 tablet (75 mg) by mouth daily   fluticasone (FLONASE) 50 MCG/ACT nasal spray   No No   Sig: Spray 1 spray into both nostrils daily   furosemide (LASIX) 20 MG tablet   No No   Sig: Take 1 tablet (20 mg) by mouth daily   lisinopril (ZESTRIL) 5 MG tablet   No No   Sig: Take 1 tablet (5 mg) by mouth daily   omega 3 1000 MG CAPS   Yes No   Sig: Take 2 g by mouth   rivaroxaban ANTICOAGULANT (XARELTO) 15 MG TABS tablet   No No   Sig: Take 1 tablet (15 mg) by mouth daily (with dinner)   triamcinolone (KENALOG) 0.1 % external cream   No No   Sig: Apply topically 2 times daily      Facility-Administered Medications: None     Allergies   Allergies   Allergen Reactions     Epinephrine Shortness Of Breath     Very rapid heartrate     Dabigatran Itching       Social History   I have reviewed this patient's social history and updated it with pertinent information if needed. Issac Zelaya  reports that he quit smoking about 58 years ago. His smoking use included cigarettes. He has never used smokeless tobacco. He reports current alcohol use. He reports that he does not use drugs.    Family History   I have reviewed this patient's family history and updated it with pertinent information if needed.   Family History   Problem Relation Age of Onset     Heart Disease Mother      Diabetes Father      No Known Problems Brother       No Known Problems Sister      No Known Problems Brother      No Known Problems Sister      Breast Cancer No family hx of        Review of Systems   The 10 point Review of Systems is negative other than noted in the HPI or here.     Physical Exam   Temp: 99.5  F (37.5  C) Temp src: Oral BP: (!) 152/100 Pulse: 69   Resp: 20 SpO2: 99 % O2 Device: None (Room air)    Vital Signs with Ranges  Temp:  [98.1  F (36.7  C)-99.5  F (37.5  C)] 99.5  F (37.5  C)  Pulse:  [69-80] 69  Resp:  [20] 20  BP: (125-152)/() 152/100  SpO2:  [93 %-99 %] 99 %  0 lbs 0 oz    Constitutional: Awake, alert,  no apparent distress. Weak and fatigued appearing   Eyes: Conjunctiva and pupils examined and normal.  HEENT: Moist mucous membranes, normal dentition.  Respiratory: Clear to auscultation bilaterally, no crackles or wheezing.  Cardiovascular: Regular rate and rhythm, normal S1 and S2, and no murmur noted.  GI: Soft, non-distended, non-tender, bowel sounds present. No rebound tenderness or guarding.  Lymph/Hematologic: No anterior cervical or supraclavicular adenopathy.  Skin: No rashes, no cyanosis, no edema.  Musculoskeletal: No deformities noted.  No erythema or tenderness. Moving all extremities.  Neurologic: No focal deficits noted. Speech is clear. Coordination and strength grossly normal.   Psychiatric: Appropriate affect.    Data   Data reviewed today:      EKG: afib   Imaging:   Recent Results (from the past 24 hour(s))   XR Chest 2 Views    Narrative    XR CHEST 2 VIEWS   2/23/2023 2:50 PM     HISTORY: Shortness of breath    COMPARISON: Chest radiograph 8/10/2018      Impression    IMPRESSION: No acute cardiopulmonary disease.    SANCHEZ BACA MD         SYSTEM ID:  [id number]   CT Abdomen Pelvis w Contrast    Narrative    EXAM: CT ABDOMEN PELVIS W CONTRAST  LOCATION: [facility]  DATE/TIME: 2/23/2023 4:37 PM    INDICATION: bilateral groin pain  COMPARISON: None.  TECHNIQUE: CT scan of the abdomen and  pelvis was performed following injection of IV contrast. Multiplanar reformats were obtained. Dose reduction techniques were used.  CONTRAST: 100mL Isovue 370    FINDINGS:   LOWER CHEST: Mild bibasilar interstitial thickening with traction bronchiectasis is consistent with fibrosis. Biatrial enlargement. Coronary artery calcification. Small hiatal hernia.    HEPATOBILIARY: A few subcentimeter hepatic hypodensities which are too small to characterize.    PANCREAS: Normal.    SPLEEN: A 2.4 cm peripherally calcified hypodense lesion in the spleen is consistent with a benign process, most likely prior infection.    ADRENAL GLANDS: Normal.    KIDNEYS/BLADDER: Bilateral cortical thinning. No hydronephrosis.    BOWEL: Diverticulosis of the colon. No acute inflammatory change. No obstruction.     LYMPH NODES: Normal.    VASCULATURE: Moderate atherosclerosis. Mild stenosis of the origin of the superior mesenteric artery. 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 A saccular infrarenal abdominal aortic artery aneurysm measuring 3.2 x 2.9 cm, image 108:3.    PELVIC ORGANS: Prostatomegaly measuring 5.4 cm in transverse dimension.    MUSCULOSKELETAL: Moderate degenerative changes. Small bilateral fat-containing inguinal hernias, right greater than left.      Impression    IMPRESSION:   1.  Small fat-containing inguinal hernias.  2.  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 See guidelines below.  3.  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  4.  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  5.  Mild pulmonary fibrosis.    REFERENCE:  SVS practice guidelines on the care of patients with an abdominal aortic aneurysm. Carlota MATA. J Vasc Surg, 2018. PMID: 74604350.    Aorta size: 3.0 cm to 3.9 cm: Surveillance imaging at 3-year intervals.       Recent Labs   Lab 02/23/23  1342   WBC 4.5   HGB 13.3      *      POTASSIUM 4.3   CHLORIDE 104   CO2 23   BUN 20.0   CR 1.25*   ANIONGAP 11   FILIPE 8.8   *    ALBUMIN 3.7   PROTTOTAL 6.5   BILITOTAL 0.8   ALKPHOS 51   ALT 9*   AST 16   LIPASE 18           Marta Devries PA-C  [facility]  February 23, 2023  Securely message with the Vocera Web Console (learn more here)  Text page via [facility] Paging/Directory

## 2023-02-25 ENCOUNTER — APPOINTMENT (OUTPATIENT)
Dept: GENERAL RADIOLOGY | Facility: CLINIC | Age: 88
DRG: 177 | End: 2023-02-25
Attending: INTERNAL MEDICINE
Payer: COMMERCIAL

## 2023-02-25 PROBLEM — U07.1 INFECTION DUE TO 2019 NOVEL CORONAVIRUS: Status: ACTIVE | Noted: 2023-02-25

## 2023-02-25 PROBLEM — M62.81 GENERALIZED MUSCLE WEAKNESS: Status: ACTIVE | Noted: 2023-02-25

## 2023-02-25 PROBLEM — I48.20 CHRONIC A-FIB (H): Status: ACTIVE | Noted: 2023-02-25

## 2023-02-25 LAB
ALBUMIN SERPL BCG-MCNC: 3 G/DL (ref 3.5–5.2)
ALBUMIN UR-MCNC: 30 MG/DL
ALP SERPL-CCNC: 47 U/L (ref 40–129)
ALT SERPL W P-5'-P-CCNC: 19 U/L (ref 10–50)
ANION GAP SERPL CALCULATED.3IONS-SCNC: 13 MMOL/L (ref 7–15)
ANION GAP SERPL CALCULATED.3IONS-SCNC: 14 MMOL/L (ref 7–15)
APPEARANCE UR: ABNORMAL
AST SERPL W P-5'-P-CCNC: 47 U/L (ref 10–50)
BASOPHILS # BLD AUTO: 0 10E3/UL (ref 0–0.2)
BASOPHILS NFR BLD AUTO: 0 %
BILIRUB SERPL-MCNC: 0.4 MG/DL
BILIRUB UR QL STRIP: NEGATIVE
BUN SERPL-MCNC: 23.1 MG/DL (ref 8–23)
BUN SERPL-MCNC: 23.8 MG/DL (ref 8–23)
C DIFF TOX B STL QL: NEGATIVE
CALCIUM SERPL-MCNC: 8 MG/DL (ref 8.8–10.2)
CALCIUM SERPL-MCNC: 9.2 MG/DL (ref 8.8–10.2)
CHLORIDE SERPL-SCNC: 103 MMOL/L (ref 98–107)
CHLORIDE SERPL-SCNC: 98 MMOL/L (ref 98–107)
CK SERPL-CCNC: 322 U/L (ref 39–308)
COLOR UR AUTO: YELLOW
CORTIS SERPL-MCNC: 14.7 UG/DL
CREAT SERPL-MCNC: 1.24 MG/DL (ref 0.67–1.17)
CREAT SERPL-MCNC: 1.26 MG/DL (ref 0.67–1.17)
CRP SERPL-MCNC: 149.88 MG/L
CRP SERPL-MCNC: 197.94 MG/L
DEPRECATED HCO3 PLAS-SCNC: 19 MMOL/L (ref 22–29)
DEPRECATED HCO3 PLAS-SCNC: 22 MMOL/L (ref 22–29)
EOSINOPHIL # BLD AUTO: 0 10E3/UL (ref 0–0.7)
EOSINOPHIL NFR BLD AUTO: 0 %
ERYTHROCYTE [DISTWIDTH] IN BLOOD BY AUTOMATED COUNT: 13.3 % (ref 10–15)
ERYTHROCYTE [DISTWIDTH] IN BLOOD BY AUTOMATED COUNT: 13.6 % (ref 10–15)
GFR SERPL CREATININE-BSD FRML MDRD: 55 ML/MIN/1.73M2
GFR SERPL CREATININE-BSD FRML MDRD: 56 ML/MIN/1.73M2
GLUCOSE BLDC GLUCOMTR-MCNC: 90 MG/DL (ref 70–99)
GLUCOSE BLDC GLUCOMTR-MCNC: 91 MG/DL (ref 70–99)
GLUCOSE BLDC GLUCOMTR-MCNC: 93 MG/DL (ref 70–99)
GLUCOSE SERPL-MCNC: 87 MG/DL (ref 70–99)
GLUCOSE SERPL-MCNC: 89 MG/DL (ref 70–99)
GLUCOSE UR STRIP-MCNC: NEGATIVE MG/DL
HCT VFR BLD AUTO: 38.9 % (ref 40–53)
HCT VFR BLD AUTO: 45.1 % (ref 40–53)
HGB BLD-MCNC: 13 G/DL (ref 13.3–17.7)
HGB BLD-MCNC: 13.1 G/DL (ref 13.3–17.7)
HGB BLD-MCNC: 14.7 G/DL (ref 13.3–17.7)
HGB UR QL STRIP: ABNORMAL
IMM GRANULOCYTES # BLD: 0 10E3/UL
IMM GRANULOCYTES NFR BLD: 0 %
KETONES UR STRIP-MCNC: NEGATIVE MG/DL
LACTATE SERPL-SCNC: 1.5 MMOL/L (ref 0.7–2)
LACTATE SERPL-SCNC: 1.6 MMOL/L (ref 0.7–2)
LACTATE SERPL-SCNC: 2 MMOL/L (ref 0.7–2)
LACTATE SERPL-SCNC: 2.2 MMOL/L (ref 0.7–2)
LEUKOCYTE ESTERASE UR QL STRIP: NEGATIVE
LYMPHOCYTES # BLD AUTO: 0.7 10E3/UL (ref 0.8–5.3)
LYMPHOCYTES NFR BLD AUTO: 10 %
MAGNESIUM SERPL-MCNC: 1.8 MG/DL (ref 1.7–2.3)
MCH RBC QN AUTO: 32.8 PG (ref 26.5–33)
MCH RBC QN AUTO: 33.2 PG (ref 26.5–33)
MCHC RBC AUTO-ENTMCNC: 32.6 G/DL (ref 31.5–36.5)
MCHC RBC AUTO-ENTMCNC: 33.4 G/DL (ref 31.5–36.5)
MCV RBC AUTO: 101 FL (ref 78–100)
MCV RBC AUTO: 99 FL (ref 78–100)
MONOCYTES # BLD AUTO: 1.1 10E3/UL (ref 0–1.3)
MONOCYTES NFR BLD AUTO: 15 %
MRSA DNA SPEC QL NAA+PROBE: NEGATIVE
MUCOUS THREADS #/AREA URNS LPF: PRESENT /LPF
NEUTROPHILS # BLD AUTO: 5.5 10E3/UL (ref 1.6–8.3)
NEUTROPHILS NFR BLD AUTO: 75 %
NITRATE UR QL: NEGATIVE
NRBC # BLD AUTO: 0 10E3/UL
NRBC BLD AUTO-RTO: 0 /100
PH UR STRIP: 5 [PH] (ref 5–7)
PLATELET # BLD AUTO: 117 10E3/UL (ref 150–450)
PLATELET # BLD AUTO: 151 10E3/UL (ref 150–450)
POTASSIUM SERPL-SCNC: 3.8 MMOL/L (ref 3.4–5.3)
POTASSIUM SERPL-SCNC: 4 MMOL/L (ref 3.4–5.3)
PROCALCITONIN SERPL IA-MCNC: 0.53 NG/ML
PROCALCITONIN SERPL IA-MCNC: 0.91 NG/ML
PROT SERPL-MCNC: 5.7 G/DL (ref 6.4–8.3)
RBC # BLD AUTO: 3.92 10E6/UL (ref 4.4–5.9)
RBC # BLD AUTO: 4.48 10E6/UL (ref 4.4–5.9)
RBC URINE: 1 /HPF
SA TARGET DNA: POSITIVE
SODIUM SERPL-SCNC: 134 MMOL/L (ref 136–145)
SODIUM SERPL-SCNC: 135 MMOL/L (ref 136–145)
SP GR UR STRIP: 1.02 (ref 1–1.03)
TROPONIN T SERPL HS-MCNC: 22 NG/L
TROPONIN T SERPL HS-MCNC: 26 NG/L
UROBILINOGEN UR STRIP-MCNC: NORMAL MG/DL
WBC # BLD AUTO: 7.4 10E3/UL (ref 4–11)
WBC # BLD AUTO: 8.1 10E3/UL (ref 4–11)
WBC URINE: 3 /HPF

## 2023-02-25 PROCEDURE — 86140 C-REACTIVE PROTEIN: CPT | Performed by: INTERNAL MEDICINE

## 2023-02-25 PROCEDURE — 85018 HEMOGLOBIN: CPT | Performed by: INTERNAL MEDICINE

## 2023-02-25 PROCEDURE — 36415 COLL VENOUS BLD VENIPUNCTURE: CPT | Performed by: INTERNAL MEDICINE

## 2023-02-25 PROCEDURE — 250N000011 HC RX IP 250 OP 636: Performed by: INTERNAL MEDICINE

## 2023-02-25 PROCEDURE — 99233 SBSQ HOSP IP/OBS HIGH 50: CPT | Performed by: INTERNAL MEDICINE

## 2023-02-25 PROCEDURE — 87077 CULTURE AEROBIC IDENTIFY: CPT | Performed by: INTERNAL MEDICINE

## 2023-02-25 PROCEDURE — 87641 MR-STAPH DNA AMP PROBE: CPT | Performed by: INTERNAL MEDICINE

## 2023-02-25 PROCEDURE — 82310 ASSAY OF CALCIUM: CPT | Performed by: HOSPITALIST

## 2023-02-25 PROCEDURE — 84145 PROCALCITONIN (PCT): CPT | Performed by: INTERNAL MEDICINE

## 2023-02-25 PROCEDURE — 87040 BLOOD CULTURE FOR BACTERIA: CPT | Performed by: INTERNAL MEDICINE

## 2023-02-25 PROCEDURE — 71045 X-RAY EXAM CHEST 1 VIEW: CPT

## 2023-02-25 PROCEDURE — 87506 IADNA-DNA/RNA PROBE TQ 6-11: CPT | Performed by: INTERNAL MEDICINE

## 2023-02-25 PROCEDURE — G0378 HOSPITAL OBSERVATION PER HR: HCPCS

## 2023-02-25 PROCEDURE — 36415 COLL VENOUS BLD VENIPUNCTURE: CPT | Performed by: HOSPITALIST

## 2023-02-25 PROCEDURE — 250N000013 HC RX MED GY IP 250 OP 250 PS 637: Performed by: INTERNAL MEDICINE

## 2023-02-25 PROCEDURE — 250N000013 HC RX MED GY IP 250 OP 250 PS 637: Performed by: PHYSICIAN ASSISTANT

## 2023-02-25 PROCEDURE — 83605 ASSAY OF LACTIC ACID: CPT | Performed by: INTERNAL MEDICINE

## 2023-02-25 PROCEDURE — 86140 C-REACTIVE PROTEIN: CPT | Performed by: HOSPITALIST

## 2023-02-25 PROCEDURE — 82962 GLUCOSE BLOOD TEST: CPT

## 2023-02-25 PROCEDURE — 84484 ASSAY OF TROPONIN QUANT: CPT | Performed by: INTERNAL MEDICINE

## 2023-02-25 PROCEDURE — 87205 SMEAR GRAM STAIN: CPT | Performed by: INTERNAL MEDICINE

## 2023-02-25 PROCEDURE — 85004 AUTOMATED DIFF WBC COUNT: CPT | Performed by: HOSPITALIST

## 2023-02-25 PROCEDURE — 96372 THER/PROPH/DIAG INJ SC/IM: CPT

## 2023-02-25 PROCEDURE — 85027 COMPLETE CBC AUTOMATED: CPT | Performed by: INTERNAL MEDICINE

## 2023-02-25 PROCEDURE — 200N000001 HC R&B ICU

## 2023-02-25 PROCEDURE — 84145 PROCALCITONIN (PCT): CPT | Performed by: HOSPITALIST

## 2023-02-25 PROCEDURE — 258N000003 HC RX IP 258 OP 636: Performed by: INTERNAL MEDICINE

## 2023-02-25 PROCEDURE — 81001 URINALYSIS AUTO W/SCOPE: CPT | Performed by: INTERNAL MEDICINE

## 2023-02-25 PROCEDURE — 82040 ASSAY OF SERUM ALBUMIN: CPT | Performed by: INTERNAL MEDICINE

## 2023-02-25 PROCEDURE — 87493 C DIFF AMPLIFIED PROBE: CPT | Performed by: INTERNAL MEDICINE

## 2023-02-25 PROCEDURE — 82533 TOTAL CORTISOL: CPT | Performed by: INTERNAL MEDICINE

## 2023-02-25 PROCEDURE — 83735 ASSAY OF MAGNESIUM: CPT | Performed by: INTERNAL MEDICINE

## 2023-02-25 PROCEDURE — 82550 ASSAY OF CK (CPK): CPT | Performed by: INTERNAL MEDICINE

## 2023-02-25 PROCEDURE — 999N000065 XR CHEST PORT 1 VIEW

## 2023-02-25 RX ORDER — CEFTRIAXONE 2 G/1
2 INJECTION, POWDER, FOR SOLUTION INTRAMUSCULAR; INTRAVENOUS EVERY 24 HOURS
Status: DISCONTINUED | OUTPATIENT
Start: 2023-02-25 | End: 2023-02-25

## 2023-02-25 RX ORDER — DEXAMETHASONE SODIUM PHOSPHATE 10 MG/ML
6 INJECTION, SOLUTION INTRAMUSCULAR; INTRAVENOUS EVERY 24 HOURS
Status: DISCONTINUED | OUTPATIENT
Start: 2023-02-25 | End: 2023-03-02

## 2023-02-25 RX ORDER — ONDANSETRON 2 MG/ML
4 INJECTION INTRAMUSCULAR; INTRAVENOUS EVERY 6 HOURS PRN
Status: DISCONTINUED | OUTPATIENT
Start: 2023-02-25 | End: 2023-03-03 | Stop reason: HOSPADM

## 2023-02-25 RX ORDER — LIDOCAINE 40 MG/G
CREAM TOPICAL
Status: DISCONTINUED | OUTPATIENT
Start: 2023-02-25 | End: 2023-02-27

## 2023-02-25 RX ORDER — DEXTROSE MONOHYDRATE 25 G/50ML
25-50 INJECTION, SOLUTION INTRAVENOUS
Status: DISCONTINUED | OUTPATIENT
Start: 2023-02-25 | End: 2023-03-03 | Stop reason: HOSPADM

## 2023-02-25 RX ORDER — GUAIFENESIN 200 MG/10ML
10 LIQUID ORAL EVERY 4 HOURS PRN
Status: DISCONTINUED | OUTPATIENT
Start: 2023-02-25 | End: 2023-03-03 | Stop reason: HOSPADM

## 2023-02-25 RX ORDER — NOREPINEPHRINE BITARTRATE 0.02 MG/ML
.01-.6 INJECTION, SOLUTION INTRAVENOUS CONTINUOUS
Status: DISCONTINUED | OUTPATIENT
Start: 2023-02-25 | End: 2023-02-28

## 2023-02-25 RX ORDER — DOXYCYCLINE 100 MG/1
100 CAPSULE ORAL EVERY 12 HOURS SCHEDULED
Status: DISCONTINUED | OUTPATIENT
Start: 2023-02-25 | End: 2023-03-02

## 2023-02-25 RX ORDER — ENOXAPARIN SODIUM 100 MG/ML
40 INJECTION SUBCUTANEOUS EVERY 24 HOURS
Status: DISCONTINUED | OUTPATIENT
Start: 2023-02-25 | End: 2023-02-26

## 2023-02-25 RX ORDER — NICOTINE POLACRILEX 4 MG
15-30 LOZENGE BUCCAL
Status: DISCONTINUED | OUTPATIENT
Start: 2023-02-25 | End: 2023-03-03 | Stop reason: HOSPADM

## 2023-02-25 RX ORDER — LIDOCAINE 40 MG/G
CREAM TOPICAL
Status: ACTIVE | OUTPATIENT
Start: 2023-02-25 | End: 2023-02-28

## 2023-02-25 RX ORDER — SODIUM CHLORIDE 9 MG/ML
INJECTION, SOLUTION INTRAVENOUS CONTINUOUS
Status: ACTIVE | OUTPATIENT
Start: 2023-02-25 | End: 2023-02-25

## 2023-02-25 RX ORDER — ONDANSETRON 4 MG/1
4 TABLET, ORALLY DISINTEGRATING ORAL EVERY 6 HOURS PRN
Status: DISCONTINUED | OUTPATIENT
Start: 2023-02-25 | End: 2023-03-03 | Stop reason: HOSPADM

## 2023-02-25 RX ADMIN — SODIUM CHLORIDE: 9 INJECTION, SOLUTION INTRAVENOUS at 18:56

## 2023-02-25 RX ADMIN — ACETAMINOPHEN 650 MG: 325 TABLET, FILM COATED ORAL at 06:40

## 2023-02-25 RX ADMIN — LISINOPRIL 5 MG: 5 TABLET ORAL at 08:19

## 2023-02-25 RX ADMIN — CEFTRIAXONE 2 G: 2 INJECTION, POWDER, FOR SOLUTION INTRAMUSCULAR; INTRAVENOUS at 12:12

## 2023-02-25 RX ADMIN — SODIUM CHLORIDE 1000 ML: 9 INJECTION, SOLUTION INTRAVENOUS at 09:38

## 2023-02-25 RX ADMIN — ENOXAPARIN SODIUM 40 MG: 40 INJECTION SUBCUTANEOUS at 15:30

## 2023-02-25 RX ADMIN — SODIUM CHLORIDE: 9 INJECTION, SOLUTION INTRAVENOUS at 12:15

## 2023-02-25 RX ADMIN — DOXYCYCLINE HYCLATE 100 MG: 100 CAPSULE ORAL at 12:15

## 2023-02-25 RX ADMIN — DOXYCYCLINE HYCLATE 100 MG: 100 CAPSULE ORAL at 20:38

## 2023-02-25 RX ADMIN — SENNOSIDES AND DOCUSATE SODIUM 1 TABLET: 50; 8.6 TABLET ORAL at 08:19

## 2023-02-25 RX ADMIN — SODIUM CHLORIDE 500 ML: 9 INJECTION, SOLUTION INTRAVENOUS at 13:39

## 2023-02-25 RX ADMIN — ATENOLOL 50 MG: 25 TABLET ORAL at 08:19

## 2023-02-25 RX ADMIN — SODIUM CHLORIDE 1000 ML: 9 INJECTION, SOLUTION INTRAVENOUS at 09:53

## 2023-02-25 RX ADMIN — TAZOBACTAM SODIUM AND PIPERACILLIN SODIUM 3.38 G: 375; 3 INJECTION, SOLUTION INTRAVENOUS at 19:10

## 2023-02-25 RX ADMIN — DEXAMETHASONE SODIUM PHOSPHATE 6 MG: 10 INJECTION INTRAMUSCULAR; INTRAVENOUS at 14:40

## 2023-02-25 RX ADMIN — SODIUM CHLORIDE 1000 ML: 9 INJECTION, SOLUTION INTRAVENOUS at 12:14

## 2023-02-25 RX ADMIN — FAMOTIDINE 20 MG: 10 INJECTION, SOLUTION INTRAVENOUS at 16:04

## 2023-02-25 RX ADMIN — ATORVASTATIN CALCIUM 20 MG: 20 TABLET, FILM COATED ORAL at 20:38

## 2023-02-25 ASSESSMENT — ACTIVITIES OF DAILY LIVING (ADL)
ADLS_ACUITY_SCORE: 32
CONCENTRATING,_REMEMBERING_OR_MAKING_DECISIONS_DIFFICULTY: NO
ADLS_ACUITY_SCORE: 22
ADLS_ACUITY_SCORE: 28
ADLS_ACUITY_SCORE: 22
ADLS_ACUITY_SCORE: 22
DIFFICULTY_EATING/SWALLOWING: NO
ADLS_ACUITY_SCORE: 32
ADLS_ACUITY_SCORE: 22
DRESSING/BATHING_DIFFICULTY: NO
HEARING_DIFFICULTY_OR_DEAF: NO
ADLS_ACUITY_SCORE: 22
DIFFICULTY_COMMUNICATING: NO
ADLS_ACUITY_SCORE: 22
WALKING_OR_CLIMBING_STAIRS_DIFFICULTY: NO
WEAR_GLASSES_OR_BLIND: YES
TOILETING_ISSUES: NO
ADLS_ACUITY_SCORE: 36
ADLS_ACUITY_SCORE: 30
ADLS_ACUITY_SCORE: 22

## 2023-02-25 NOTE — PROGRESS NOTES
ROOM # 201    Living Situation Home w/ wife  :  Son- Karthik    Activity level at baseline: Ind w/ walker or cane  Activity level on admit: Ax2    Who will be transporting you at discharge: Family    Patient registered to observation; given Patient Bill of Rights; given the opportunity to ask questions about observation status and their plan of care.  Patient has been oriented to the observation room, bathroom and call light is in place.    Discussed discharge goals and expectations with patient/family.

## 2023-02-25 NOTE — PROGRESS NOTES
Procedure Note           Peripherally Inserted Central Line Catheter (PICC):       Issac Zelaya  MRN# [id number]   February 25, 2023, 5:57 PM Indication: Hypotension           Pause for the cause: Consent for catheter placement procedure signed  Time out completed  Patient ID's verified using two distinct indicators  All necessary equipment is present  Site marked if extremity to be used has been predetermined   Type of line to be used: PICC   Full barrier precautions used: Yes   Skin preparation: Chlorehexidine Gluconate 25% with isopropyl alcohol 70%  Chloraprep   Date of insertion: February 25, 2023, 5:58 PM   Device type: Double lumen, valved, 5.0   Catheter brand: [employer]al [employer]   Lot number: [id number]   Insertion location: Right basilic vein   Method of placement: Ultrasound   Number of attempts: With ultrasound: 1   Without ultrasound: 0   Difficulty threading: No   Midline IV device: Dressing dry and intact  Chlorhexidine patch  Catheter securement device   Arm circumference: 32cm   Midline extremity circumference: 0 cm   Internal length: 43 cm   Midline visible catheter length: 1 cm   Total catheter length: 44 cm   Tip termination: Low SVC/CA junction   Method of verification: Chest x-ray   Midline patency post placement: Positive blood return  Flushes without difficulty  Saline locked   Line flush: Line flush documented on the eMAR 20cc Saline    Placement verified by: Physician Dr. Andrade   Catheter placed by: Saul Barrientos RN   Discontinuation form initiated: No   Patient tolerance: Tolerated well  Subcutaneous injection lidocaine      Summary:  This procedure was performed without difficulty and he tolerated the procedure well with no immediate complications.       Recorded by Saul Barrientos RN    Attestation:  Saul Barrientos RN

## 2023-02-25 NOTE — PROGRESS NOTES
Pt. A/O x4. VSS except elevated temp at times. Pt c/o body aches everywhere. Tylenol given. Room air sating at 97%. Frequent productive cough with yellow/clear sputum. Crackles in lungs. Pt assist of 2, not OOB due to weakness. Will continue to provide supportive cares.

## 2023-02-25 NOTE — PROGRESS NOTES
PRIMARY DIAGNOSIS: Weakness/ Covid + OUTPATIENT/OBSERVATION GOALS TO BE MET BEFORE DISCHARGE:  1. ADLs back to baseline: No    2. Activity and level of assistance: Up with maximum assistance.      3. Pain status: Improved-controlled with oral pain medications.    4. Return to near baseline physical activity: No     Discharge Planner Nurse   Safe discharge environment identified: No  Barriers to discharge: Yes       Entered by: Coretta Salvador RN 02/25/2023         /83 (BP Location: Left arm)   Pulse 96   Temp (!) 101.3  F (38.5  C) (Oral)   Resp 22   Wt 83.1 kg (183 lb 1.6 oz)   SpO2 96%   BMI 25.54 kg/m      Pt is A&Ox 4. VSS on 1L.  Temperature 99.5.  Pt has frequent productive cough with dark green phlegm.  SOB after coughing fits.Pt has external catheter. Urine output is dark shirley colored.   SL. Regular diet. Pt Ax2. Will continue to monitor.   Please review provider order for any additional goals.   Nurse to notify provider when observation goals have been met and patient is ready for discharge.

## 2023-02-25 NOTE — PROGRESS NOTES
[facility]    Hospitalist Progress Note    Date of Service (when I saw the patient): 02/24/2023  Provider:  Serjio Finn MD   Text Page  7am - 6PM       Assessment & Plan   Issac Zelaya is a 88 year old male with a history of chronic atrial fibrillation on Xarelto for anticoagulation, hypertension, carotid artery stenosis, hyperlipidemia, obstructive sleep apnea, hypertension, chronic kidney disease and obesity who presents with significant fatigue, body aches and generalized weakness for the last 1-2 days.  He tested + for Covid 19.     Work up in the emergency room he was afebrile vital signs are stable.  EKG shows rate controlled atrial fibrillation.  Laboratory result was unremarkable with stable AUNG mild BNP elevation of 4774.  He had normal VBG with normal CBC.  Urinalysis was clear for infection but he did test positive for COVID, which is likely the cause of his weakness. CXR shows no PNA or edema.   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  He was recommended for admission for PT eval for safe disposition.      #Generalized weakness, myalgia due to COVID 19   Pt denies any fever, cough or respiratory sxs. No sick contact.  Vitals and labs are re-assuring. Main complaint is weakness and he normally is active and pt lives with wife in independent home. Concern that he is unable to get around at home.   Plan:  -  PT eval to assess for discharge needs   -  Medically stable at present, cont supportive cares and monitor for decompensation   -  SW eval for disposition needs      #Rate controlled AFib.   -  Stable, on Xarelto, Atenolol   -  Last ECHO 2018 shows preserved EF at 55-60%     #Elevated BNP  No e/o of acute heart failure.   CXR clear, Cont to monitor     CKD stage II.   Cr slightly elevated. Close f/u  No NSAIDs.  Daily BMP.       #HLP   - statin      #CVA/CAD   -   Stable resume PTA meds including   -  Plavix     Incidental findings  -Small fat-containing inguinal hernias.  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  -Mild pulmonary fibrosis.       DVT Prophylaxis: DOAC  Code Status: Full Code    Disposition: Expected discharge in 1 -2 days once discharge plan outlined.    Interval History   Patient reports non productive cough but not dyspnea. He has been spiking fever, Tylenol is the only antipyretic usable given current Creatinine.     -Data reviewed today: I reviewed all new labs and imaging results over the last 24 hours. I personally reviewed the EKG tracing showing Afib wCVR in monitor.    Physical Exam   Temp: (Abnormal) 101.1  F (38.4  C) Temp src: Oral BP: (Abnormal) 123/112 Pulse: 68   Resp: 11 SpO2: 98 % O2 Device: None (Room air)    There were no vitals filed for this visit.  Vital Signs with Ranges  Temp:  [98.2  F (36.8  C)-102.1  F (38.9  C)] 101.1  F (38.4  C)  Pulse:  [62-78] 68  Resp:  [11-20] 11  BP: (115-153)/() 123/112  SpO2:  [91 %-99 %] 98 %  I/O last 3 completed shifts:  In: -   Out: 950 [Urine:950]    GEN:  Alert, cooperative, appears comfortable, NAD.  HEENT:  Normocephalic/atraumatic, no scleral icterus, no nasal discharge, mouth moist.  CV:  Regular rate and rhythm, no murmur or JVD.  S1 + S2 noted, no S3 or S4.  LUNGS:  Clear to auscultation bilaterally without rales/rhonchi/wheezing/retractions.  Symmetric chest rise on inhalation noted.  ABD:  Active bowel sounds, soft, non-tender/non-distended.  No rebound/guarding/rigidity.  EXT:  No edema or cyanosis.  No joint synovitis noted.  SKIN:  Dry to touch, no exanthems noted in the visualized areas.       Medications       atenolol  50 mg Oral Daily     atorvastatin  20 mg Oral QPM     [Held by provider] clopidogrel  75 mg Oral Daily     lisinopril  5 mg Oral Daily     [Held by provider] rivaroxaban  ANTICOAGULANT  15 mg Oral Daily with supper     senna-docusate  1 tablet Oral BID    Or     senna-docusate  2 tablet Oral BID       Data   Recent Labs   Lab 02/24/23  0840 02/23/23  1342   WBC 5.4 4.5   HGB 13.7 13.3    100   * 149*   * 138   POTASSIUM 4.7 4.3   CHLORIDE 99 104   CO2 25 23   BUN 19.9 20.0   CR 1.30* 1.25*   ANIONGAP 11 11   FILIPE 9.3 8.8   GLC 88 102*   ALBUMIN  --  3.7   PROTTOTAL  --  6.5   BILITOTAL  --  0.8   ALKPHOS  --  51   ALT  --  9*   AST  --  16   LIPASE  --  18       No results found for this or any previous visit (from the past 24 hour(s)).      Securely message with the Vocera Web Console (learn more here)  Text page via [facility] Paging/Directory        Disclaimer: This note consists of symbols derived from keyboarding, dictation and/or voice recognition software. As a result, there may be errors in the script that have gone undetected. Please consider this when interpreting information found in this chart.

## 2023-02-25 NOTE — PLAN OF CARE
Goal Outcome Evaluation:       Pt alert and oriented, but forgetful, BP elevated, Lactic fired, and signed. Productive cough, Albuterol Inhaler given on 1L NC de-stats with coughing. Incontinent, external cath in place. Some bloody noted at the tip of pennis. requires an assist of 2 very weak, unable to eat dinner. Atarax given for generalized body ache. Nursing will continue to monitor.

## 2023-02-25 NOTE — PLAN OF CARE
Care from 08:00am to 2:40pm    BP 94/58 (BP Location: Left arm)   Pulse 64   Temp 98.3  F (36.8  C) (Axillary)   Resp 20   Wt 83.1 kg (183 lb 1.6 oz)   SpO2 90%   BMI 25.54 kg/m      Writer got called by jeff that pt was lightheaded and dizzy, BP was 69/44 RRT called, Pt placed in Trendelenburg, 2L NS administered with pressure bags, O2 increased to 6L per NC 95%. Reported pain in anterior thighs, cramping, 2 episodes of diarrhea, loose runny stools recorded, BP was up to 100/66 at 10:00am    BP stayed stable between 100-110 systolic and 52-70 diastolic. Pt started reporting dizziness and lightheaded ness again at 10:43 am, BP was 89/49 at 10:43am additional bolus of 1L NS administered and pt had 2 more loose stools. BP improved to 90s on 50s, from 10:55am to 01:15pm during which time  pt requested a burger which he ate a quarter of, was doing better with oxygen at 96% on 1L per oxymask, took off O2 completely while pt was eating and pt was at 90 on room air. Spontaneously voiding shirley colored urine after hydration. Complained of soreness in throat form coughing too much. Mucus  Was greenish brown, with frequent oral suctioning.  Specimen collected and sent to lab. Rocephin 2grams IV administered at 12:12pm.    BP began dropping again at 13:24, 500ml bolus given and decision made for pt to be transferred to ICU. Pt had 2 more loose greenish stools. See MAR and flow sheet for other details. IV Decadron administered at 02:30pm before transportation to ICU.

## 2023-02-25 NOTE — PROGRESS NOTES
Noted order for PICC placement. Contaced bedside ADITYA Monroe who indicated that Dr. Nguyễn no longer wanted PICC per patient needs. Order discontinued.  Tom Nicole RN on 2/25/2023 at 2:54 PM

## 2023-02-25 NOTE — PROGRESS NOTES
[facility]  Hospitalist Progress Note  Alexander Nguyễn MD 02/25/2023    Reason for Stay (Diagnosis): hypotension, COVID         Assessment and Plan:      Summary of Stay: Issac Zelaya is a 88 year old male with a history of chronic atrial fibrillation on Xarelto for anticoagulation, hypertension, carotid artery stenosis, hyperlipidemia, obstructive sleep apnea, hypertension, chronic kidney disease and obesity who presents with significant fatigue, body aches and generalized weakness for the last 1-2 days.  He tested + for Covid 19.     Work up in the emergency room he was afebrile vital signs are stable.  EKG shows rate controlled atrial fibrillation.  Laboratory result was unremarkable with stable AUNG mild BNP elevation of 4774.  He had normal VBG with normal CBC.  Urinalysis was clear for infection but he did test positive for COVID, which is likely the cause of his weakness. CXR shows no PNA or edema.   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  He was recommended for admission for PT eval for safe disposition.     Since admit course was initially complicated by gross hematuria.  DOAC has been held and hematuria has improved/resolved.  hgb remained stable without significant blood loss    He has a significant cough productive of yellow sputum.  He has also developed loose BM since admit.    On the morning of 2/25, the patient developed acute hypotension after receiving his AM BP medications.  SBP dropped into the 70s.  An RRT was called.  After 2 liters of IVF his SBP improved to the low 100s.  He was given another 1 liter of IVF.   Lab work-up notable for slight lactic acid elevation that has since normalized.   hgb stable.  Troponin unremarkable.  ECG was done showing afib.  repeat CXR today showed possible developing infiltrate near R heart border.  Despite 3 liters of IVF his BP  was again dropping and plan is for transfer to ICU given persistent hypotension despite IVF resuscitation.      Initial working diagnosis was volume depletion and subsequent hypotension after receiving his morning anti-HTN meds.  However despite fluid hydration pressures remain soft and transfer to ICU warranted for monitoring and possible vasopressor need    Plans today:  - blood cx x 2 obtained prior to antibiotics  - Start Zosyn and doxycycline for possible superimposed bacterial PNA  - start dexamethasone in the setting of COVID, cough, hypoxia  - hold off on Remdesivir for now  - CXR and ECG obtained and reviewed as above  - check procal, troponin, UA with cx (not collected prior to antibiotics)  - follow lactic acid  - check cdiff   - check cortisol level  - order TTE  - sputum cx  - hold anti-HTN meds  - place PICC line for access  - norepi gtt as needed to keep MAP>60  - I updated son at bedside earlier this morning  - I spoke with intensivist Dr. Yu today.  Appreciate his assistance    Addendum:  I called family to let them know pt is moving to ICU    #hypotension  - consider volume depletion, sepsis, acute cardiac decompensation as possiblities  - no e/o acute blood loss anemia as cause  - doubt PE given chronic DOAC use  - no e/o acute MI on ECG or by troponin  - check TTE.  Previous EF has been normal  - consider sepsis.  blood cx obtained prior to antibiotics  - start Zosyn/doxycycline  - check cdiff (diarrhea)  - check cortisol level prior to starting dexamethasone (COVID)    #COVID infection  - CXR does not show typical COVID changes  - on 1 liter supplemental oxygen  - start dexamethasone 2/25  - holding off on remdesivir  - consider superimposed bacterial PNA  - fully vaccinated (confirmed with son)    #Rate controlled AFib.   -  Xarelto on hold for now due to gross hematuria earlier in hospitalization  - atenolol on hold due to low BP  -  Last ECHO 2018 shows preserved EF at 55-60%  - repeat  TTE this admit     CKD stage II.   - creatinine stable near 1.2     #CVA/CAD   -  Plavix and Xarelto on hold due to gross hematuria earlier in hospitalization    #Gross hematuria   - resolved  - appreciate urology input  - continue to hold DOAC and Plavix for now     Incidental findings  -Small fat-containing inguinal hernias.  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  -Mild pulmonary fibrosis.    DVT Prophylaxis: Enoxaparin (Lovenox) subcutaneous (conitnue until DOAC resumed)  Code Status: Full Code  Discharge Dispo: unknown  Estimated Disch Date / # of Days until Disch: unknown.  Pt transferring to ICU for hypotension        Interval History (Subjective):      This morning the patient developed acute hypotension after receiving his AM medications.  SBP dropped into the 70s.  An RRT was called.  After 2 liters of IVF his SBP improved to the low 100s.  He was given another 1 liter of IVF.   He is c/o productive cough and having loose BM  Lab work up notable for slight lactic acid elevation that has since normalized.   hgb stable.  Troponin unremarkable.  ECG was done showing afib.  repeat CXR today showed possible developing infiltrate near R heart border.  Despite 3 liters of IVF his BP is again dropping and plan is for transfer to ICU.                    Physical Exam:      Last Vital Signs:  BP (!) 86/51 (BP Location: Left arm)   Pulse 72   Temp 98.1  F (36.7  C) (Oral)   Resp 20   Wt 83.1 kg (183 lb 1.6 oz)   SpO2 90%   BMI 25.54 kg/m        Intake/Output Summary (Last 24 hours) at 2/25/2023 1409  Last data filed at 2/25/2023 0100  Gross per 24 hour   Intake 100 ml   Output --   Net 100 ml       Constitutional: Awake, alert, cooperative, no apparent distress   Respiratory: Clear to auscultation bilaterally, intermittent cough, no crackles or wheezing   Cardiovascular: Irreg rhythm, normal S1 and S2, and no murmur  noted   Abdomen: Normal bowel sounds, soft, non-distended, non-tender   Skin: No rashes, no cyanosis, dry to touch   Neuro: Alert and oriented x3, no weakness, numbness, memory loss   Extremities: No edema, normal range of motion   Other(s):        All other systems: Negative          Medications:      All current medications were reviewed with changes reflected in problem list.         Data:      All new lab and imaging data was reviewed.   Labs:  Recent Labs   Lab 02/25/23  0931 02/25/23  0630 02/24/23  0840 02/23/23  1342   NA  --  134* 135* 138   POTASSIUM  --  3.8 4.7 4.3   CHLORIDE  --  98 99 104   CO2  --  22 25 23   ANIONGAP  --  14 11 11   GLC 91 87 88 102*   BUN  --  23.8* 19.9 20.0   CR  --  1.26* 1.30* 1.25*   GFRESTIMATED  --  55* 53* 56*   FILIPE  --  9.2 9.3 8.8     Recent Labs   Lab 02/25/23  1054 02/25/23  0630   WBC  --  7.4   HGB 13.1* 14.7   HCT  --  45.1   MCV  --  101*   PLT  --  151      Imaging:   Recent Results (from the past 24 hour(s))   XR Chest Port 1 View    Narrative    EXAM: XR CHEST PORT 1 VIEW  LOCATION: [facility]  DATE/TIME: 2/25/2023 10:27 AM    INDICATION: COVID 19 infection. Shortness of breath. Cough.  COMPARISON: 2/23/2023      Impression    IMPRESSION: Shallow inspiration. Cardiac silhouette size is unchanged and within normal limits. Subsegmental atelectasis, right lung base. Crowding of the pulmonary vascularity secondary to the shallow inspiration. Calcified atherosclerotic changes of   the aortic arch.

## 2023-02-25 NOTE — PROGRESS NOTES
RRT called @ 0918. Pt was hypotensive 69/44. Pt placed in trendelenburg, 2L NS given with pressure bags an continued with maintenance fluid @ 125 ml/hr. PT was placed up to 6 L NC due to SOB, EKG showed A fib which is chronic. New onset leg cramping in anterior thighs, baseline neuropathy.  Pts BP continued to decrease again around 0943 another bolus of NS given. PT is now on 1 L oxymask. BP in low 10os/60s. Family updated. Will continue to monitor.

## 2023-02-25 NOTE — UTILIZATION REVIEW
Admission Status; Secondary Review Determination         Under the authority of the Utilization Management Committee, the utilization review process indicated a secondary review on the above patient.  The review outcome is based on review of the medical records, discussions with staff, and applying clinical experience noted on the date of the review.        (x)      Inpatient Status Appropriate - This patient's medical care is consistent with medical management for inpatient care and reasonable inpatient medical practice.     RATIONALE FOR DETERMINATION   The patient is a 88-year-old male who was admitted on 2/23/2023.  He came to the ED for evaluation of fatigue, body aches and generalized weakness.  He tested positive for COVID-19.  Last night his condition deteriorated and developed significant hypotension despite 3 L of fluid being administered.  He is being transferred to the ICU today for more aggressive treatment.  He was started on Zosyn and doxycycline and there appears to be a right-sided infiltrate developing in his lungs.  Based on unstable condition and need for aggressive treatment of potential infection and possible sepsis with cultures pending.  Recommend switching from observation to inpatient status.  Alexander Nguyễn MD, hospitalist, will be sent an [employer] paging text with this recommendation and encouraged to call me with any questions.      The severity of illness, intensity of service provided, expected LOS and risk for adverse outcome make the care complex, high risk and appropriate for hospital admission.        The information on this document is developed by the utilization review team in order for the business office to ensure compliance.  This only denotes the appropriateness of proper admission status and does not reflect the quality of care rendered.         The definitions of Inpatient Status and Observation Status used in making the determination above are those provided in the CMS  Coverage Manual, Chapter 1 and Chapter 6, section 70.4.      Sincerely,     Rohan Douglass MD  Physician Advisor  Utilization Review/ Case Management  [facility].

## 2023-02-26 ENCOUNTER — APPOINTMENT (OUTPATIENT)
Dept: PHYSICAL THERAPY | Facility: CLINIC | Age: 88
DRG: 177 | End: 2023-02-26
Payer: COMMERCIAL

## 2023-02-26 ENCOUNTER — APPOINTMENT (OUTPATIENT)
Dept: CARDIOLOGY | Facility: CLINIC | Age: 88
DRG: 177 | End: 2023-02-26
Attending: INTERNAL MEDICINE
Payer: COMMERCIAL

## 2023-02-26 ENCOUNTER — APPOINTMENT (OUTPATIENT)
Dept: SPEECH THERAPY | Facility: CLINIC | Age: 88
DRG: 177 | End: 2023-02-26
Attending: INTERNAL MEDICINE
Payer: COMMERCIAL

## 2023-02-26 LAB
ANION GAP SERPL CALCULATED.3IONS-SCNC: 11 MMOL/L (ref 7–15)
BASOPHILS # BLD AUTO: 0 10E3/UL (ref 0–0.2)
BASOPHILS NFR BLD AUTO: 0 %
BUN SERPL-MCNC: 20.5 MG/DL (ref 8–23)
C COLI+JEJUNI+LARI FUSA STL QL NAA+PROBE: NOT DETECTED
CALCIUM SERPL-MCNC: 8.2 MG/DL (ref 8.8–10.2)
CHLORIDE SERPL-SCNC: 108 MMOL/L (ref 98–107)
CREAT SERPL-MCNC: 0.91 MG/DL (ref 0.67–1.17)
DEPRECATED HCO3 PLAS-SCNC: 20 MMOL/L (ref 22–29)
EC STX1 GENE STL QL NAA+PROBE: NOT DETECTED
EC STX2 GENE STL QL NAA+PROBE: NOT DETECTED
EOSINOPHIL # BLD AUTO: 0 10E3/UL (ref 0–0.7)
EOSINOPHIL NFR BLD AUTO: 0 %
ERYTHROCYTE [DISTWIDTH] IN BLOOD BY AUTOMATED COUNT: 13.5 % (ref 10–15)
GFR SERPL CREATININE-BSD FRML MDRD: 82 ML/MIN/1.73M2
GLUCOSE BLDC GLUCOMTR-MCNC: 100 MG/DL (ref 70–99)
GLUCOSE BLDC GLUCOMTR-MCNC: 87 MG/DL (ref 70–99)
GLUCOSE BLDC GLUCOMTR-MCNC: 90 MG/DL (ref 70–99)
GLUCOSE BLDC GLUCOMTR-MCNC: 93 MG/DL (ref 70–99)
GLUCOSE SERPL-MCNC: 94 MG/DL (ref 70–99)
HCT VFR BLD AUTO: 38.2 % (ref 40–53)
HGB BLD-MCNC: 12.9 G/DL (ref 13.3–17.7)
IMM GRANULOCYTES # BLD: 0 10E3/UL
IMM GRANULOCYTES NFR BLD: 0 %
LACTATE SERPL-SCNC: 0.7 MMOL/L (ref 0.7–2)
LACTATE SERPL-SCNC: 0.7 MMOL/L (ref 0.7–2)
LVEF ECHO: NORMAL
LYMPHOCYTES # BLD AUTO: 0.6 10E3/UL (ref 0.8–5.3)
LYMPHOCYTES NFR BLD AUTO: 11 %
MCH RBC QN AUTO: 33.7 PG (ref 26.5–33)
MCHC RBC AUTO-ENTMCNC: 33.8 G/DL (ref 31.5–36.5)
MCV RBC AUTO: 100 FL (ref 78–100)
MONOCYTES # BLD AUTO: 0.5 10E3/UL (ref 0–1.3)
MONOCYTES NFR BLD AUTO: 8 %
NEUTROPHILS # BLD AUTO: 4.8 10E3/UL (ref 1.6–8.3)
NEUTROPHILS NFR BLD AUTO: 81 %
NOROV GI+II ORF1-ORF2 JNC STL QL NAA+PR: NOT DETECTED
NRBC # BLD AUTO: 0 10E3/UL
NRBC BLD AUTO-RTO: 0 /100
PLATELET # BLD AUTO: 121 10E3/UL (ref 150–450)
POTASSIUM SERPL-SCNC: 3.8 MMOL/L (ref 3.4–5.3)
RBC # BLD AUTO: 3.83 10E6/UL (ref 4.4–5.9)
RVA NSP5 STL QL NAA+PROBE: NOT DETECTED
SALMONELLA SP RPOD STL QL NAA+PROBE: NOT DETECTED
SHIGELLA SP+EIEC IPAH STL QL NAA+PROBE: NOT DETECTED
SODIUM SERPL-SCNC: 139 MMOL/L (ref 136–145)
V CHOL+PARA RFBL+TRKH+TNAA STL QL NAA+PR: NOT DETECTED
WBC # BLD AUTO: 5.9 10E3/UL (ref 4–11)
Y ENTERO RECN STL QL NAA+PROBE: NOT DETECTED

## 2023-02-26 PROCEDURE — 85025 COMPLETE CBC W/AUTO DIFF WBC: CPT | Performed by: INTERNAL MEDICINE

## 2023-02-26 PROCEDURE — 999N000208 ECHOCARDIOGRAM COMPLETE

## 2023-02-26 PROCEDURE — 255N000002 HC RX 255 OP 636: Performed by: EMERGENCY MEDICINE

## 2023-02-26 PROCEDURE — 250N000013 HC RX MED GY IP 250 OP 250 PS 637: Performed by: EMERGENCY MEDICINE

## 2023-02-26 PROCEDURE — 200N000001 HC R&B ICU

## 2023-02-26 PROCEDURE — 80048 BASIC METABOLIC PNL TOTAL CA: CPT | Performed by: INTERNAL MEDICINE

## 2023-02-26 PROCEDURE — 250N000013 HC RX MED GY IP 250 OP 250 PS 637: Performed by: PHYSICIAN ASSISTANT

## 2023-02-26 PROCEDURE — 99232 SBSQ HOSP IP/OBS MODERATE 35: CPT | Mod: CS | Performed by: INTERNAL MEDICINE

## 2023-02-26 PROCEDURE — 93306 TTE W/DOPPLER COMPLETE: CPT | Mod: 26 | Performed by: INTERNAL MEDICINE

## 2023-02-26 PROCEDURE — 92610 EVALUATE SWALLOWING FUNCTION: CPT | Mod: GN | Performed by: SPEECH-LANGUAGE PATHOLOGIST

## 2023-02-26 PROCEDURE — 250N000013 HC RX MED GY IP 250 OP 250 PS 637: Performed by: INTERNAL MEDICINE

## 2023-02-26 PROCEDURE — 250N000011 HC RX IP 250 OP 636: Performed by: INTERNAL MEDICINE

## 2023-02-26 PROCEDURE — 97110 THERAPEUTIC EXERCISES: CPT | Mod: GP | Performed by: PHYSICAL THERAPIST

## 2023-02-26 PROCEDURE — 83605 ASSAY OF LACTIC ACID: CPT | Performed by: INTERNAL MEDICINE

## 2023-02-26 RX ORDER — ATENOLOL 25 MG/1
50 TABLET ORAL DAILY
Status: DISCONTINUED | OUTPATIENT
Start: 2023-02-26 | End: 2023-02-28

## 2023-02-26 RX ORDER — FAMOTIDINE 20 MG/1
20 TABLET, FILM COATED ORAL 2 TIMES DAILY
Status: DISCONTINUED | OUTPATIENT
Start: 2023-02-26 | End: 2023-03-03 | Stop reason: HOSPADM

## 2023-02-26 RX ADMIN — FAMOTIDINE 20 MG: 20 TABLET, FILM COATED ORAL at 20:40

## 2023-02-26 RX ADMIN — ATORVASTATIN CALCIUM 20 MG: 20 TABLET, FILM COATED ORAL at 20:40

## 2023-02-26 RX ADMIN — DOXYCYCLINE HYCLATE 100 MG: 100 CAPSULE ORAL at 20:40

## 2023-02-26 RX ADMIN — ATENOLOL 50 MG: 25 TABLET ORAL at 16:48

## 2023-02-26 RX ADMIN — TAZOBACTAM SODIUM AND PIPERACILLIN SODIUM 3.38 G: 375; 3 INJECTION, SOLUTION INTRAVENOUS at 23:17

## 2023-02-26 RX ADMIN — TAZOBACTAM SODIUM AND PIPERACILLIN SODIUM 3.38 G: 375; 3 INJECTION, SOLUTION INTRAVENOUS at 00:23

## 2023-02-26 RX ADMIN — DOXYCYCLINE HYCLATE 100 MG: 100 CAPSULE ORAL at 09:00

## 2023-02-26 RX ADMIN — FAMOTIDINE 20 MG: 20 TABLET, FILM COATED ORAL at 13:23

## 2023-02-26 RX ADMIN — TAZOBACTAM SODIUM AND PIPERACILLIN SODIUM 3.38 G: 375; 3 INJECTION, SOLUTION INTRAVENOUS at 11:30

## 2023-02-26 RX ADMIN — DEXAMETHASONE SODIUM PHOSPHATE 6 MG: 10 INJECTION INTRAMUSCULAR; INTRAVENOUS at 13:23

## 2023-02-26 RX ADMIN — RIVAROXABAN 15 MG: 15 TABLET, FILM COATED ORAL at 16:48

## 2023-02-26 RX ADMIN — HUMAN ALBUMIN MICROSPHERES AND PERFLUTREN 3 ML: 10; .22 INJECTION, SOLUTION INTRAVENOUS at 12:36

## 2023-02-26 RX ADMIN — TAZOBACTAM SODIUM AND PIPERACILLIN SODIUM 3.38 G: 375; 3 INJECTION, SOLUTION INTRAVENOUS at 17:03

## 2023-02-26 RX ADMIN — TAZOBACTAM SODIUM AND PIPERACILLIN SODIUM 3.38 G: 375; 3 INJECTION, SOLUTION INTRAVENOUS at 05:57

## 2023-02-26 ASSESSMENT — ACTIVITIES OF DAILY LIVING (ADL)
FALL_HISTORY_WITHIN_LAST_SIX_MONTHS: NO
EQUIPMENT_CURRENTLY_USED_AT_HOME: CANE, STRAIGHT
ADLS_ACUITY_SCORE: 36
ADLS_ACUITY_SCORE: 30
ADLS_ACUITY_SCORE: 36
DOING_ERRANDS_INDEPENDENTLY_DIFFICULTY: YES
ADLS_ACUITY_SCORE: 36
CHANGE_IN_FUNCTIONAL_STATUS_SINCE_ONSET_OF_CURRENT_ILLNESS/INJURY: YES
ADLS_ACUITY_SCORE: 30
ADLS_ACUITY_SCORE: 36
ADLS_ACUITY_SCORE: 34
ADLS_ACUITY_SCORE: 34
ADLS_ACUITY_SCORE: 36
ADLS_ACUITY_SCORE: 34

## 2023-02-26 NOTE — PLAN OF CARE
ICU End of Shift Summary.  For vital signs and complete assessments, please see documentation flowsheets.      Pertinent assessments: Pt A&O, able to make needs known, slept well between cares. On RA while awake, 2L oxymask while sleeping d/t episodes of apnea. Productive cough with yellow/white sputum, pt independently using yaunker to suction secretions. Tele Afib with BBB. BP stable, did not require any vasopressors. Afebrile. Incontinent of urine, primofit placed, working well with good UOP. Frequent watery stools- rectal pouch placed to protect skin, working well with small leakage.     Major Shift Events: no major events this shift    Plan (Upcoming Events): tbd  Discharge/Transfer Needs: tbd     Bedside Shift Report Completed : yes  Bedside Safety Check Completed: yes

## 2023-02-26 NOTE — PROGRESS NOTES
[facility]  Hospitalist Progress Note  Zan Reina MD 02/26/2023    Reason for Stay (Diagnosis): Hypotension, COVID         Assessment and Plan:      Summary of Stay: Issac Zelaya is a 88 year old male with a history of chronic atrial fibrillation on Xarelto for anticoagulation, hypertension, carotid artery stenosis, hyperlipidemia, obstructive sleep apnea, hypertension, chronic kidney disease and obesity who presents with significant fatigue, body aches and generalized weakness for the last 1-2 days.  He tested + for Covid 19.     Work up in the emergency room he was afebrile vital signs are stable.  EKG shows rate controlled atrial fibrillation.  Laboratory result was unremarkable with stable AUNG mild BNP elevation of 4774.  He had normal VBG with normal CBC.  Urinalysis was clear for infection but he did test positive for COVID, which is likely the cause of his weakness. CXR shows no PNA or edema.   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  He was recommended for admission for PT eval for safe disposition.     Since admit course was initially complicated by gross hematuria.  DOAC has been held and hematuria has improved/resolved.  hgb remained stable without significant blood loss    He has a significant cough productive of yellow sputum.  He has also developed loose BM since admit.    On the morning of 2/25, the patient developed acute hypotension after receiving his AM BP medications.  SBP dropped into the 70s.  An RRT was called.  After 2 liters of IVF his SBP improved to the low 100s.  He was given another 1 liter of IVF.   Lab work-up notable for slight lactic acid elevation that has since normalized.   hgb stable.  Troponin unremarkable.  ECG was done showing afib.  repeat CXR today showed possible developing infiltrate near R heart border.  Despite 3 liters of IVF his BP  was again dropping and plan is for transfer to ICU given persistent hypotension despite IVF resuscitation.      Initial working diagnosis was volume depletion and subsequent hypotension after receiving his morning anti-HTN meds.  However despite fluid hydration pressures remain soft and transfer to ICU warranted for monitoring and possible vasopressor need    Plans today 2/26:  -Continue IV antibiotics Zosyn and doxycycline.  -Follow-up blood culture x 2.  -Continue dexamethasone in the setting of COVID, cough, hypoxia.  -Patient transferred to ICU for hypotension, did not require pressors.  -procal 0.91, troponin down to 22.  -Urinalysis negative  -Lactic acid is normal.  -C. difficile test is negative, norovirus is not detected, enteric panel is negative.  -Echocardiogram reported EF of 55%, ascending aorta is mildly dilated at 4.1, grade 1 or early diastolic dysfunction.  -Continue to hold blood pressure medications.  -Patient never required pressors in the ICU.      #hypotension secondary to hypovolemia  -Suspected due to volume depletion, possible sepsis consider volume depletion, sepsis, acute cardiac decompensation as possiblities  -Etiology of acute blood loss anemia which could contribute to hypotension.  -Unlikely to be PE as patient was on chronic DOAC use  -Echo showed EF of 55%  -Sepsis work-up in progress, UA is negative, viral panel negative, stool for C. difficile negative, other cultures results pending.  -Continue Zosyn and doxycycline for now.  -Continue dexamethasone    #COVID infection  - CXR does not show typical COVID changes  -He was on 1 liter supplemental oxygen currently on room air  -Continue dexamethasone 2/25  - holding off on remdesivir  -Cover for possible superimposed bacterial pneumonia  -Patient is fully vaccinated.    #Rate controlled AFib.   -Xarelto restarted.  -It was held earlier due to gross hematuria.  -No more hematuria, started on Lovenox therapeutic dose intensivist  recommendation.  -Atenolol on hold due to low BP  -Echo was reported.    CKD stage II.   -Creatinine stable near 1.2.     #CVA/CAD   - PTA patient was on Plavix and Xarelto, which was held, now restarted Xarelto.  -Still risk of bleeding continue to monitor.    #Gross hematuria: Resolved  - Appreciate urology input  -DOAC and Plavix were held after admission, restarted on Rivaroxaban.  -If the bleeding recurs will completely stop anticoagulant.     Incidental findings  -Small fat-containing inguinal hernias.  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  -Mild pulmonary fibrosis.    DVT Prophylaxis: Enoxaparin (Lovenox) therapeutic dose  Code Status: Full Code  Discharge Dispo: unknown  Estimated Disch Date / # of Days until Disch: unknown. Transfer to medical floor.        Interval History (Subjective):      Patient seen and examined, assumed care today, feels better, no new overnight issues, came off oxygen, was gently hydrated, did not require any pressors, blood pressure is on the high side today 144/92.                   Physical Exam:      Last Vital Signs:  /81   Pulse 71   Temp 97.5  F (36.4  C) (Temporal)   Resp (!) 8   Wt 85 kg (187 lb 6.3 oz)   SpO2 99%   BMI 26.14 kg/m        Constitutional: Awake, alert, cooperative, no apparent distress   Respiratory: Clear to auscultation bilaterally, intermittent cough, no crackles or wheezing   Cardiovascular: Irreg rhythm, normal S1 and S2, and no murmur noted   Abdomen: Normal bowel sounds, soft, non-distended, non-tender   Skin: No rashes, no cyanosis, dry to touch   Neuro: Alert and oriented x3, no weakness, numbness, memory loss   Extremities: No edema, normal range of motion   Other(s):        All other systems: Negative          Medications:      All current medications were reviewed with changes reflected in problem list.         Data:      All new lab and  imaging data was reviewed.   Labs:  Recent Labs   Lab 02/26/23  1246 02/26/23  0744 02/26/23  0556 02/25/23  1501 02/25/23  1416 02/25/23  0931 02/25/23  0630   NA  --   --  139  --  135*  --  134*   POTASSIUM  --   --  3.8  --  4.0  --  3.8   CHLORIDE  --   --  108*  --  103  --  98   CO2  --   --  20*  --  19*  --  22   ANIONGAP  --   --  11  --  13  --  14   GLC 93 87 94   < > 89   < > 87   BUN  --   --  20.5  --  23.1*  --  23.8*   CR  --   --  0.91  --  1.24*  --  1.26*   GFRESTIMATED  --   --  82  --  56*  --  55*   FILIPE  --   --  8.2*  --  8.0*  --  9.2    < > = values in this interval not displayed.     Recent Labs   Lab 02/26/23  0556   WBC 5.9   HGB 12.9*   HCT 38.2*      *      Imaging:   Recent Results (from the past 24 hour(s))   XR Chest Port 1 View    Narrative    EXAM: XR CHEST PORT 1 VIEW  LOCATION: [facility]  DATE/TIME: 2/25/2023 10:27 AM    INDICATION: COVID 19 infection. Shortness of breath. Cough.  COMPARISON: 2/23/2023      Impression    IMPRESSION: Shallow inspiration. Cardiac silhouette size is unchanged and within normal limits. Subsegmental atelectasis, right lung base. Crowding of the pulmonary vascularity secondary to the shallow inspiration. Calcified atherosclerotic changes of   the aortic arch.

## 2023-02-26 NOTE — PROGRESS NOTES
"Recommendations: 1) Regular diet with thin liquids; patient self selecting softer/moister food items. 2) Safe swallow strategies include upright for all meals and remain upright for one hour after, small bites/sips, chew well, and alternate liquids and solids. 3) SLP to follow 3x/week for dysphagia management. Patient may benefit from a video swallow study to further assess the pharyngeal phase of the swallow and r/o pharyngeal residue.     IP Clinical Swallow Evaluation  [facility]  02/26/23 0128   Appointment Info   Signing Clinician's Name / Credentials (SLP) Erick Aburto MS CCC-SLP   General Information   Onset of Illness/Injury or Date of Surgery 02/25/23   Referring Physician Zan Reina MD   Patient/Family Therapy Goal Statement (SLP) To figure out why my food is sticking.   Pertinent History of Current Problem Per provider \"Summary of Stay: Issac Zelaya is a 88 year old male with a history of chronic atrial fibrillation on Xarelto for anticoagulation, hypertension, carotid artery stenosis, hyperlipidemia, obstructive sleep apnea, hypertension, chronic kidney disease and obesity who presents with significant fatigue, body aches and generalized weakness for the last 1-2 days.  He tested + for Covid 19.\"   General Observations Patient alert, pleasant and cooperative.   Pain Assessment   Patient Currently in Pain No   Type of Evaluation   Type of Evaluation Swallow Evaluation   Oral Motor   Oral Musculature generally intact   Structural Abnormalities none present   Mucosal Quality good   Dentition (Oral Motor)   Dentition (Oral Motor) natural dentition;some missing teeth  (missing molar on the left which was where his partial was stabalized so unable to wear partial.)   Facial Symmetry (Oral Motor)   Facial Symmetry (Oral Motor) WNL   Lip Function (Oral Motor)   Lip Range of Motion (Oral Motor) WNL   Lip Strength (Oral Motor) WNL   Tongue Function (Oral Motor)   Tongue " Strength (Oral Motor) WNL   Tongue Coordination/Speed (Oral Motor) WNL   Tongue ROM (Oral Motor) WNL   Jaw Function (Oral Motor)   Jaw Function (Oral Motor) WNL   Cough/Swallow/Gag Reflex (Oral Motor)   Gag Reflex (Oral Motor) not tested   Volitional Throat Clear/Cough (Oral Motor) WNL   Volitional Swallow (Oral Motor) WNL   Vocal Quality/Secretion Management (Oral Motor)   Vocal Quality (Oral Motor) WNL   Secretion Management (Oral Motor) WNL   General Swallowing Observations   Past History of Dysphagia Patient states that he had a tooth removed a few months ago and has had difficulty since that time. He feels that food sticks somewhere in his throat but up high. He avoids certain foods such as lettuce.   Respiratory Support (General Swallowing Observations) none   Current Diet/Method of Nutritional Intake (General Swallowing Observations, NIS) regular diet;thin liquids (level 0)   Swallowing Evaluation Clinical swallow evaluation   Clinical Swallow Evaluation   Feeding Assistance no assistance needed   Clinical Swallow Evaluation Textures Trialed thin liquids;pureed;solid foods;soft & bite-sized   Clinical Swallow Eval: Thin Liquid Texture Trial   Mode of Presentation, Thin Liquids straw;self-fed   Volume of Liquid or Food Presented 4 ounces thin   Oral Phase of Swallow WFL   Pharyngeal Phase of Swallow intact   Diagnostic Statement No overt s/sx of aspiration. No feeling of anything sticking.   Clinical Swallow Evaluation: Puree Solid Texture Trial   Mode of Presentation, Puree spoon;self-fed   Volume of Puree Presented 2 ounces sherbert   Oral Phase, Puree WFL   Pharyngeal Phase, Puree intact   Diagnostic Statement No overt s/sx of aspiration. No feeling of anything sticking.   Clinical Swallow Eval: Soft & Bite Sized   Mode of Presentation spoon;self-fed   Volume Presented 1 ounce mac-n-cheese, one banana   Oral Phase WFL   Pharyngeal Phase intact   Diagnostic Statement No overt s/sx of aspiration. No feeling  of anything sticking with mac n cheese but did say that when he coughed after banana (not related to aspiration) he felt that some of the banana came up.   Clinical Swallow Evaluation: Solid Food Texture Trial   Mode of Presentation self-fed   Volume Presented several bites of chicken   Oral Phase impaired mastication  (chicken very dry, excessive chewing)   Pharyngeal Phase intact   Diagnostic Statement Slow mastication with dry chicken; no overt s/sx of aspiration. No feeling of anything sticking.   Esophageal Phase of Swallow   Patient reports or presents with symptoms of esophageal dysphagia Yes  (sticking, some history of regurgitation)   Swallowing Recommendations   Diet Consistency Recommendations regular diet;thin liquids (level 0)  (self select softer, moister food items)   Supervision Level for Intake patient independent   Mode of Delivery Recommendations bolus size, small;place food on right side of mouth;slow rate of intake   Swallowing Maneuver Recommendations alternate food and liquid intake   Recommended Feeding/Eating Techniques (Swallow Eval) patient is independent, no specific recommendations   Medication Administration Recommendations, Swallowing (SLP) whole with liquid   Instrumental Assessment Recommendations VFSS (videofluoroscopic swallowing study)  (may be indicated to further assess the pharyngeal phase of the swallow)   General Therapy Interventions   Planned Therapy Interventions Dysphagia Treatment   Dysphagia treatment Instruction of safe swallow strategies   Clinical Impression   Criteria for Skilled Therapeutic Interventions Met (SLP Eval) Yes, treatment indicated   Risks & Benefits of therapy have been explained evaluation/treatment results reviewed;care plan/treatment goals reviewed;risks/benefits reviewed;current/potential barriers reviewed;participants voiced agreement with care plan;participants included;patient   Clinical Impression Comments Clinical dysphagia evaluation  completed per provider orders. Oral mech unremarkable. Missing teeth on left which is impacting mastication. Trials of thin, puree, soft & bite sized and solids trialed. No overt s/sx of aspiration across consistencies. Coughed x3 during assessment that did not appear related to aspiration but rather his COVID diagnosis. Patient states that he does alternate liquids and solids, however, when observing eating meal from outside of room doing chart review, patient was not observed drinking liquids during meal. Patient related his swallowing issues to starting a few months ago when he had some teeth removed.   SLP Total Evaluation Time   Eval: oral/pharyngeal swallow function, clinical swallow Minutes (56580) 39   Total Session Time   Total Session Time (sum of timed and untimed services) 39

## 2023-02-26 NOTE — PROGRESS NOTES
ICU End of Shift Summary.  For vital signs and complete assessments, please see documentation flowsheets.      Pertinent assessments:   Neuro:Alert and orientated X4, pt able to make needs known.   Cardiac: Controlled Afib, takes Xarelto currently being help. Pt able to stay off Levo this shift. BPs WNL  Resp: LS coarse, dim, productive cough, large amt yellow/white thick secretions. Pt using oral suction to spit secretions. Pt using Oxymask @ 1 LPM.  GI: Regular diet, pt states he has had poor appetite with loss of taste.  : Urinal as needed, adequate UO  Skin: Scattered bruising, large amt Reji upper extremities. Left AC has nickel size skin tea.  Lines: PICC, PIV X1  Drips:None    Major Shift Events:    Pt transferred from OBS unit due to being hypotensive @ 1450.  PICC placed, ok to use per PICC STAT  Loose Bms, watery- stool samples sent  UA sent        Plan (Upcoming Events): Monitor hypotension, wean off oxygen as tolerated.    Discharge/Transfer Needs: TBD     Bedside Shift Report Completed : yes  Bedside Safety Check Completed: yes

## 2023-02-27 LAB
ANION GAP SERPL CALCULATED.3IONS-SCNC: 11 MMOL/L (ref 7–15)
BUN SERPL-MCNC: 27.8 MG/DL (ref 8–23)
CALCIUM SERPL-MCNC: 8.5 MG/DL (ref 8.8–10.2)
CHLORIDE SERPL-SCNC: 108 MMOL/L (ref 98–107)
CREAT SERPL-MCNC: 0.94 MG/DL (ref 0.67–1.17)
CREAT SERPL-MCNC: 0.94 MG/DL (ref 0.67–1.17)
DEPRECATED HCO3 PLAS-SCNC: 20 MMOL/L (ref 22–29)
GFR SERPL CREATININE-BSD FRML MDRD: 78 ML/MIN/1.73M2
GFR SERPL CREATININE-BSD FRML MDRD: 78 ML/MIN/1.73M2
GLUCOSE SERPL-MCNC: 115 MG/DL (ref 70–99)
MAGNESIUM SERPL-MCNC: 1.8 MG/DL (ref 1.7–2.3)
PHOSPHATE SERPL-MCNC: 2.3 MG/DL (ref 2.5–4.5)
PLATELET # BLD AUTO: 141 10E3/UL (ref 150–450)
POTASSIUM SERPL-SCNC: 3.7 MMOL/L (ref 3.4–5.3)
SODIUM SERPL-SCNC: 139 MMOL/L (ref 136–145)

## 2023-02-27 PROCEDURE — 84100 ASSAY OF PHOSPHORUS: CPT | Performed by: SURGERY

## 2023-02-27 PROCEDURE — 96361 HYDRATE IV INFUSION ADD-ON: CPT

## 2023-02-27 PROCEDURE — 96375 TX/PRO/DX INJ NEW DRUG ADDON: CPT

## 2023-02-27 PROCEDURE — 200N000001 HC R&B ICU

## 2023-02-27 PROCEDURE — 99232 SBSQ HOSP IP/OBS MODERATE 35: CPT | Mod: CS | Performed by: INTERNAL MEDICINE

## 2023-02-27 PROCEDURE — 250N000013 HC RX MED GY IP 250 OP 250 PS 637: Performed by: PHYSICIAN ASSISTANT

## 2023-02-27 PROCEDURE — 250N000013 HC RX MED GY IP 250 OP 250 PS 637: Performed by: INTERNAL MEDICINE

## 2023-02-27 PROCEDURE — 83735 ASSAY OF MAGNESIUM: CPT | Performed by: SURGERY

## 2023-02-27 PROCEDURE — 85049 AUTOMATED PLATELET COUNT: CPT | Performed by: EMERGENCY MEDICINE

## 2023-02-27 PROCEDURE — G0463 HOSPITAL OUTPT CLINIC VISIT: HCPCS

## 2023-02-27 PROCEDURE — 250N000011 HC RX IP 250 OP 636: Performed by: INTERNAL MEDICINE

## 2023-02-27 PROCEDURE — 250N000013 HC RX MED GY IP 250 OP 250 PS 637: Performed by: EMERGENCY MEDICINE

## 2023-02-27 PROCEDURE — 999N000040 HC STATISTIC CONSULT NO CHARGE VASC ACCESS

## 2023-02-27 PROCEDURE — 80048 BASIC METABOLIC PNL TOTAL CA: CPT | Performed by: SURGERY

## 2023-02-27 RX ADMIN — LISINOPRIL 5 MG: 5 TABLET ORAL at 09:10

## 2023-02-27 RX ADMIN — TAZOBACTAM SODIUM AND PIPERACILLIN SODIUM 3.38 G: 375; 3 INJECTION, SOLUTION INTRAVENOUS at 23:45

## 2023-02-27 RX ADMIN — TAZOBACTAM SODIUM AND PIPERACILLIN SODIUM 3.38 G: 375; 3 INJECTION, SOLUTION INTRAVENOUS at 17:40

## 2023-02-27 RX ADMIN — TAZOBACTAM SODIUM AND PIPERACILLIN SODIUM 3.38 G: 375; 3 INJECTION, SOLUTION INTRAVENOUS at 12:04

## 2023-02-27 RX ADMIN — DOXYCYCLINE HYCLATE 100 MG: 100 CAPSULE ORAL at 09:10

## 2023-02-27 RX ADMIN — DOXYCYCLINE HYCLATE 100 MG: 100 CAPSULE ORAL at 20:48

## 2023-02-27 RX ADMIN — FAMOTIDINE 20 MG: 20 TABLET, FILM COATED ORAL at 09:10

## 2023-02-27 RX ADMIN — FAMOTIDINE 20 MG: 20 TABLET, FILM COATED ORAL at 20:48

## 2023-02-27 RX ADMIN — ATORVASTATIN CALCIUM 20 MG: 20 TABLET, FILM COATED ORAL at 20:48

## 2023-02-27 RX ADMIN — RIVAROXABAN 15 MG: 15 TABLET, FILM COATED ORAL at 17:39

## 2023-02-27 RX ADMIN — TAZOBACTAM SODIUM AND PIPERACILLIN SODIUM 3.38 G: 375; 3 INJECTION, SOLUTION INTRAVENOUS at 05:24

## 2023-02-27 RX ADMIN — DEXAMETHASONE SODIUM PHOSPHATE 6 MG: 10 INJECTION INTRAMUSCULAR; INTRAVENOUS at 14:56

## 2023-02-27 ASSESSMENT — ACTIVITIES OF DAILY LIVING (ADL)
ADLS_ACUITY_SCORE: 38
ADLS_ACUITY_SCORE: 34
ADLS_ACUITY_SCORE: 38
ADLS_ACUITY_SCORE: 34
DEPENDENT_IADLS:: INDEPENDENT

## 2023-02-27 NOTE — PLAN OF CARE
Goal Outcome Evaluation:      Plan of Care Reviewed With: patient      RN [id number]: Pt denies pain or shortness of breath. Room air. Congested, productive cough; uses yanker suction for sputum.Tele- Afib, bradycardic (rates in 40's this am, MD aware); rates improved throughout the day. Denies lightheaded/dizziness.  Pt up with assist of 1-2 and walker; up to recliner chair. Tolerating diet. Pt hopes to discharge to home.

## 2023-02-27 NOTE — PROGRESS NOTES
Goal Outcome Evaluation: Pt is alert and oriented. Denies pain. Denies SOB and pain. + Productive cough. BP stable. Heart rate low in the upper 40s. Morning atenolol held.   Pt is in Afib. Rectal pouch and external catheter in place. Plan is to encourage activity. Pt reports he has got out of bed yet. Possible transfer to floor when a bed becomes available.  aware of low phos level and low hr.        Plan of Care Reviewed With: patient     Overall Patient Progress: improvingOverall Patient Progress: improving     Outcome Evaluation: Pressors not needed

## 2023-02-27 NOTE — CONSULTS
Care Management Initial Consult    General Information  Assessment completed with: Patient,    Type of CM/SW Visit: Initial Assessment    Primary Care Provider verified and updated as needed:     Readmission within the last 30 days:        Reason for Consult: discharge planning  Advance Care Planning:            Communication Assessment  Patient's communication style: spoken language (English or Bilingual)    Hearing Difficulty or Deaf: no   Wear Glasses or Blind: yes    Cognitive  Cognitive/Neuro/Behavioral: WDL  Level of Consciousness: alert  Arousal Level: opens eyes spontaneously  Orientation: oriented x 4  Mood/Behavior: calm, cooperative  Best Language: 0 - No aphasia  Speech: logical, clear, spontaneous    Living Environment:   People in home: spouse     Current living Arrangements: house (townhouse one level no stairs)      Able to return to prior arrangements:         Family/Social Support:  Care provided by: self  Provides care for: no one  Marital Status:   Wife          Description of Support System: Supportive, Involved         Current Resources:   Patient receiving home care services: No     Community Resources: None  Equipment currently used at home: cane, straight  Supplies currently used at home: Incontinence Supplies    Employment/Financial:  Employment Status: retired        Financial Concerns:             Lifestyle & Psychosocial Needs:  Social Determinants of Health     Tobacco Use: Medium Risk     Smoking Tobacco Use: Former     Smokeless Tobacco Use: Never     Passive Exposure: Not on file   Alcohol Use: Not on file   Financial Resource Strain: Not on file   Food Insecurity: Not on file   Transportation Needs: Not on file   Physical Activity: Not on file   Stress: Not on file   Social Connections: Not on file   Intimate Partner Violence: Not on file   Depression: Not at risk     PHQ-2 Score: 0   Housing Stability: Not on file       Functional Status:  Prior to admission patient needed  "assistance:   Dependent ADLs:: Independent, Ambulation-cane  Dependent IADLs:: Independent       Additional Information:  Consult placed for assistance in discharge planning. Patient admitted for weakness and fatigue testing positive for COVID. Currently PT recommendations are for TCU.    Met with patient at the bedside to introduce CM role and discuss discharge planning. Patients bedside nurse was about to get him out of bed. Able to assess patient while we got him OOB to the chair. Very pleasant, talkative and ready to get OOB. Was able to get to sitting position with one assist and two assist with gb and walker to the chair. Slight dizziness but it was his first time out of bed. CM discussed the PT recommendations for TCU and he stated \"no way!\" \"I'll get out of bed!\" He would like to work on getting back home.     Patient lives in a one level town home with no stairs. He currently uses a cane at home and relates some chronic numbness in his feet and hands. He does still drive and his wife does not and he does not drive long distances. No current services used at home.     We discussed working hard with therapies and nursing while he is here and we will look at a discharge home. For him to go to TCU he would need to be COVID recovered which will not be until 3/5 unless we can find a TCU accepting COVID.    Will continue to follow for discharge planning    Alda Stoddard RN BSN OCN  Care Coordinator  [facility]  [phone]            "

## 2023-02-27 NOTE — PROGRESS NOTES
[facility]  Hospitalist Progress Note  Zan Reina MD 02/27/2023    Reason for Stay (Diagnosis): Hypotension, COVID         Assessment and Plan:      Summary of Stay: Issac Zelaya is a 88 year old male with a history of chronic atrial fibrillation on Xarelto for anticoagulation, hypertension, carotid artery stenosis, hyperlipidemia, obstructive sleep apnea, hypertension, chronic kidney disease and obesity who presents with significant fatigue, body aches and generalized weakness for the last 1-2 days.  He tested + for Covid 19.     Work up in the emergency room he was afebrile vital signs are stable.  EKG shows rate controlled atrial fibrillation.  Laboratory result was unremarkable with stable AUNG mild BNP elevation of 4774.  He had normal VBG with normal CBC.  Urinalysis was clear for infection but he did test positive for COVID, which is likely the cause of his weakness. CXR shows no PNA or edema.   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  He was recommended for admission for PT eval for safe disposition.     Since admit course was initially complicated by gross hematuria.  DOAC has been held and hematuria has improved/resolved.  hgb remained stable without significant blood loss    He has a significant cough productive of yellow sputum.  He has also developed loose BM since admit.    On the morning of 2/25, the patient developed acute hypotension after receiving his AM BP medications.  SBP dropped into the 70s.  An RRT was called.  After 2 liters of IVF his SBP improved to the low 100s.  He was given another 1 liter of IVF.   Lab work-up notable for slight lactic acid elevation that has since normalized.   hgb stable.  Troponin unremarkable.  ECG was done showing afib.  repeat CXR today showed possible developing infiltrate near R heart border.  Despite 3 liters of IVF his BP  was again dropping and plan is for transfer to ICU given persistent hypotension despite IVF resuscitation.      Initial working diagnosis was volume depletion and subsequent hypotension after receiving his morning anti-HTN meds.  However despite fluid hydration pressures remain soft and transfer to ICU warranted for monitoring and possible vasopressor need    Plans today 2/27:  -Continue IV antibiotics Zosyn and doxycycline, consider de-escalating antibiotics by 2/28.  -Follow-up blood cultures, so far negative.  -Continue dexamethasone in the setting of COVID, cough, hypoxia.  -Blood pressure is holding, did not require pressors.  -procal 0.91, troponin down to 22.  -Urinalysis negative  -Lactic acid is normal.  -C. difficile test is negative, norovirus is not detected, enteric panel is negative.  -Echocardiogram reported EF of 55%, ascending aorta is mildly dilated at 4.1, grade 1 or early diastolic dysfunction.  -Continue to hold blood pressure medications.  -Patient is off supplemental oxygen.  -Can be transferred to general medical floor if she remains stable today.  -Xarelto restarted on 2/26.  -If no sign of bleeding restarts Plavix on 2/28.    #hypotension secondary to hypovolemia.  -Suspected due to volume depletion, possible sepsis consider volume depletion, sepsis, acute cardiac decompensation as possiblities  -Etiology of acute blood loss anemia which could contribute to hypotension.  -Unlikely to be PE as patient was on chronic DOAC use  -Echo showed EF of 55%  -Sepsis work-up in progress, UA is negative, viral panel negative, stool for C. difficile negative, other cultures results pending.  -Continue Zosyn and Doxycycline for now.  -Continue Dexamethasone    #COVID infection.  - CXR does not show typical COVID changes  -He was off supplemental oxygen currently on room air  -Continue dexamethasone 2/25  -Holding off on remdesivir  -Cover for possible superimposed bacterial pneumonia  -Patient is fully  "vaccinated.    #Rate controlled A fib.   -Xarelto restarted.  -It was held earlier due to gross hematuria.  -Atenolol placed parameters for heart rate and blood pressure.  -Echo as reported.    CKD stage II.   -Creatinine stable 0.94, baseline.     #CVA/CAD   - PTA patient was on Plavix and Xarelto, which was held, now restarted Xarelto.  -Continue to hold Plavix for now, if there is no sign of bleeding and hemoglobin stable will restart on 2/28.      #Gross hematuria: Resolved  - Appreciate urology input  -DOAC and Plavix were held after admission, restarted on Rivaroxaban.  -If the bleeding recurs will completely stop anticoagulant.     Incidental findings  -Small fat-containing inguinal hernias.  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  -Mild pulmonary fibrosis.    Clinically Significant Risk Factors              # Hypoalbuminemia: Lowest albumin = 3 g/dL at 2/25/2023  2:16 PM, will monitor as appropriate   # Thrombocytopenia: Lowest platelets = 117 in last 2 days, will monitor for bleeding         # Overweight: Estimated body mass index is 27.33 kg/m  as calculated from the following:    Height as of 2/1/23: 1.803 m (5' 11\").    Weight as of this encounter: 88.9 kg (195 lb 15.8 oz)., PRESENT ON ADMISSION         DVT Prophylaxis: Xarelto.  Code Status: Full Code.  Discharge Dispo: unknown.  Estimated Disch Date / # of Days until Disch: May transfer to general medical floor.  If remains stable can be discharged in 2 days, likely needs TCU pending PT eval.  I discussed with patient the plan of care.      Interval History (Subjective):      Patient seen and examined, no new overnight issues, feels better, no coughing today, does not require oxygen, no chest pain no palpitation, no dizziness.                   Physical Exam:      Last Vital Signs:  /82   Pulse 65   Temp 97.9  F (36.6  C) (Oral)   Resp (!) 8   Wt " 88.9 kg (195 lb 15.8 oz)   SpO2 (!) 87%   BMI 27.33 kg/m        Constitutional: Awake, alert, cooperative, no apparent distress   Respiratory: Clear to auscultation bilaterally, intermittent cough, no crackles or wheezing   Cardiovascular: Irreg rhythm, normal S1 and S2, and no murmur noted   Abdomen: Normal bowel sounds, soft, non-distended, non-tender   Skin: No rashes, no cyanosis, dry to touch   Neuro: Alert and oriented x3, no weakness, numbness, memory loss   Extremities: No edema, normal range of motion   Other(s):        All other systems: Negative          Medications:      All current medications were reviewed with changes reflected in problem list.         Data:      All new lab and imaging data was reviewed.   Labs:  Recent Labs   Lab 02/27/23  0523 02/26/23  1246 02/26/23  0744 02/26/23  0556 02/25/23  1501 02/25/23  1416     --   --  139  --  135*   POTASSIUM 3.7  --   --  3.8  --  4.0   CHLORIDE 108*  --   --  108*  --  103   CO2 20*  --   --  20*  --  19*   ANIONGAP 11  --   --  11  --  13   * 93 87 94   < > 89   BUN 27.8*  --   --  20.5  --  23.1*   CR 0.94  0.94  --   --  0.91  --  1.24*   GFRESTIMATED 78  78  --   --  82  --  56*   FILIPE 8.5*  --   --  8.2*  --  8.0*    < > = values in this interval not displayed.     Recent Labs   Lab 02/27/23 0523 02/26/23  0556   WBC  --  5.9   HGB  --  12.9*   HCT  --  38.2*   MCV  --  100   * 121*      Imaging:   Recent Results (from the past 24 hour(s))   XR Chest Port 1 View    Narrative    EXAM: XR CHEST PORT 1 VIEW  LOCATION: [facility]  DATE/TIME: 2/25/2023 10:27 AM    INDICATION: COVID 19 infection. Shortness of breath. Cough.  COMPARISON: 2/23/2023      Impression    IMPRESSION: Shallow inspiration. Cardiac silhouette size is unchanged and within normal limits. Subsegmental atelectasis, right lung base. Crowding of the pulmonary vascularity secondary to the shallow inspiration. Calcified atherosclerotic  changes of   the aortic arch.

## 2023-02-27 NOTE — PROGRESS NOTES
UROLOGY CHART NOTE    Hematuria/urethral bleeding appears to have stopped.  Check PVR, if in retention, would place Garcia.  If significant hematuria, may need CBI with 3 way.  Cystoscopy should be completed as an outpatient.  Our office will coordinate.  Will sign off.  Please contact us with any urological concerns.    Summer Mann PA-C  [facility]   [phone]

## 2023-02-27 NOTE — PLAN OF CARE
Goal Outcome Evaluation: Pt is alert and oriented. Denies pain. BP stable with MAP>65. Heart rate reached as low as 40 bpm very briefly during sleep. Notified tele icu and asked to add on labs. BMP and mag.phosph. Pt is in Afib. Rectal pouch and external catheter in place. Plan is to encourage activity. Pt reports he has got out of bed yet. Possible transfer to floor when a bed becomes available.       Plan of Care Reviewed With: patient    Overall Patient Progress: improvingOverall Patient Progress: improving    Outcome Evaluation: Pressors not needed

## 2023-02-27 NOTE — CONSULTS
[facility] Nurse Inpatient Assessment     Consulted for: Sacrum    Patient History (according to provider note(s):      88 year old male with a history of chronic atrial fibrillation on Xarelto for anticoagulation, hypertension, carotid artery stenosis, hyperlipidemia, obstructive sleep apnea, hypertension, chronic kidney disease and obesity who presents with significant fatigue, body aches and generalized weakness for the last 1-2 days.  He tested + for Covid 19.    Areas Assessed:      Areas visualized during today's visit: Sacrum/coccyx    Wound location: Intergluteal Cleft    Last photo: 2/27/23      Wound due to: Incontinence Associated Dermatitis (IAD) and Moisture Associated Skin Damage (MASD)  Wound history/plan of care: Patient with urinary and stool incontinence, on initial assesment has a primofit and external rectal pouch in place. External rectal pouch not changed as it is intact and functioning well at this time.   Wound base: 100 % dermis     Palpation of the wound bed: normal      Drainage: none     Description of drainage: none     Measurements (length x width x depth, in cm): 2.7  x 0.2  x  0.1 cm      Tunneling: N/A     Undermining: N/A  Periwound skin: Intact      Color: normal and consistent with surrounding tissue      Temperature: normal   Odor: none  Pain: mild, tender  Pain interventions prior to dressing change: patient tolerated well  Treatment goal: Heal , Decrease moisture and Protection  STATUS: initial assessment  Supplies ordered: supplies stored on unit, discussed with RN and discussed with patient     Patient has small skin tears due to fragile skin on his right elbow and left antecubital. No s/s of infection. will write orders.    Treatment Plan:     Intergluteal cleft wound: Daily    1. Cleanse with soap and water. Pat dry.  2. Apply Mepilex Sacral.   3. Time and date dressings.  IF unable to maintain Sacral dressing due to incontinence ok to remove and  cleanse with unique cleanse and protect perineal cleanser and apply critic-aid barrier cream in thin layer to red or open areas BID.  4. Follow PIP orders    Pressure Injury prevention (please order supplies if not in room)        1.Turn/reposition every 1-2 hrs  2.   Float heels off bed at all times  3.   If incontinent Cleanse with incontinent cleanser (Unique spray #[id number]) followed by skin barrier protectant (Critic Aid paste)  BID and after each incontinent episode  4.   Prevent sliding and shear by limiting HOB to 30 degrees or less unless contraindicated, use knee gatch first if not contraindicated  5.   Chair cushion pressure redistribution as needed (#[id number]): please limit sitting to an hour at a time  6.   Optimize nutrition   7.   Consider PULSATE low air loss mattress    Right elbow and right antecubital skin tears: Q3D  1. Cleanse with wound cleanser. Pat dry with gauze.  2. Cover with oil emulsion guaze.   3. Secure with mepilex or optifoam with border dressing.   4. Time and date      Orders: Written    RECOMMEND PRIMARY TEAM ORDER: None, at this time  Education provided: plan of care  Discussed plan of care with: Patient and Nurse  WOC nurse follow-up plan: weekly  Notify WOC if wound(s) deteriorate.  Nursing to notify the Provider(s) and re-consult the WOC Nurse if new skin concern.    DATA:     Current support surface: Standard  Low air loss (ADAM pump, Isolibrium, Pulsate, skin guard, etc)  Containment of urine/stool: Primofit external catheter and External rectal pouch  BMI: Body mass index is 27.33 kg/m .   Active diet order: Orders Placed This Encounter      Regular Diet Adult     Output: I/O last 3 completed shifts:  In: 650 [P.O.:600; I.V.:50]  Out: 1020 [Urine:800; Stool:220]     Labs: Recent Labs   Lab 02/26/23  0556 02/25/23  1416   ALBUMIN  --  3.0*   HGB 12.9* 13.0*   WBC 5.9 8.1     Pressure injury risk assessment:   Sensory Perception: 3-->slightly limited  Moisture: 3-->occasionally  moist  Activity: 2-->chairfast  Mobility: 3-->slightly limited  Nutrition: 3-->adequate  Friction and Shear: 2-->potential problem  Carlos Score: 16    Korin Ashley RN CWOCN  Contact [facility] Nurse ([place])  Dept. Office Number: [phone]

## 2023-02-28 ENCOUNTER — APPOINTMENT (OUTPATIENT)
Dept: PHYSICAL THERAPY | Facility: CLINIC | Age: 88
DRG: 177 | End: 2023-02-28
Payer: COMMERCIAL

## 2023-02-28 ENCOUNTER — APPOINTMENT (OUTPATIENT)
Dept: SPEECH THERAPY | Facility: CLINIC | Age: 88
DRG: 177 | End: 2023-02-28
Payer: COMMERCIAL

## 2023-02-28 LAB
BACTERIA BLD CULT: NO GROWTH
BACTERIA BLD CULT: NO GROWTH
GLUCOSE BLDC GLUCOMTR-MCNC: 135 MG/DL (ref 70–99)
GLUCOSE BLDC GLUCOMTR-MCNC: 84 MG/DL (ref 70–99)

## 2023-02-28 PROCEDURE — 250N000013 HC RX MED GY IP 250 OP 250 PS 637: Performed by: INTERNAL MEDICINE

## 2023-02-28 PROCEDURE — 250N000013 HC RX MED GY IP 250 OP 250 PS 637: Performed by: PHYSICIAN ASSISTANT

## 2023-02-28 PROCEDURE — 97530 THERAPEUTIC ACTIVITIES: CPT | Mod: GP | Performed by: PHYSICAL THERAPIST

## 2023-02-28 PROCEDURE — 120N000001 HC R&B MED SURG/OB

## 2023-02-28 PROCEDURE — 250N000013 HC RX MED GY IP 250 OP 250 PS 637: Performed by: EMERGENCY MEDICINE

## 2023-02-28 PROCEDURE — 99233 SBSQ HOSP IP/OBS HIGH 50: CPT | Mod: CS | Performed by: INTERNAL MEDICINE

## 2023-02-28 PROCEDURE — 97116 GAIT TRAINING THERAPY: CPT | Mod: GP | Performed by: PHYSICAL THERAPIST

## 2023-02-28 PROCEDURE — 92526 ORAL FUNCTION THERAPY: CPT | Mod: GN

## 2023-02-28 PROCEDURE — 250N000011 HC RX IP 250 OP 636: Performed by: INTERNAL MEDICINE

## 2023-02-28 RX ORDER — ATENOLOL 25 MG/1
25 TABLET ORAL DAILY
Status: DISCONTINUED | OUTPATIENT
Start: 2023-03-01 | End: 2023-03-03 | Stop reason: HOSPADM

## 2023-02-28 RX ADMIN — RIVAROXABAN 15 MG: 15 TABLET, FILM COATED ORAL at 17:07

## 2023-02-28 RX ADMIN — FAMOTIDINE 20 MG: 20 TABLET, FILM COATED ORAL at 20:02

## 2023-02-28 RX ADMIN — ATORVASTATIN CALCIUM 20 MG: 20 TABLET, FILM COATED ORAL at 20:03

## 2023-02-28 RX ADMIN — DOXYCYCLINE HYCLATE 100 MG: 100 CAPSULE ORAL at 20:02

## 2023-02-28 RX ADMIN — LISINOPRIL 5 MG: 5 TABLET ORAL at 08:27

## 2023-02-28 RX ADMIN — TAZOBACTAM SODIUM AND PIPERACILLIN SODIUM 3.38 G: 375; 3 INJECTION, SOLUTION INTRAVENOUS at 05:03

## 2023-02-28 RX ADMIN — FAMOTIDINE 20 MG: 20 TABLET, FILM COATED ORAL at 08:27

## 2023-02-28 RX ADMIN — DEXAMETHASONE SODIUM PHOSPHATE 6 MG: 10 INJECTION INTRAMUSCULAR; INTRAVENOUS at 13:09

## 2023-02-28 RX ADMIN — DOXYCYCLINE HYCLATE 100 MG: 100 CAPSULE ORAL at 08:27

## 2023-02-28 ASSESSMENT — ACTIVITIES OF DAILY LIVING (ADL)
ADLS_ACUITY_SCORE: 37
ADLS_ACUITY_SCORE: 36
ADLS_ACUITY_SCORE: 36
ADLS_ACUITY_SCORE: 37
ADLS_ACUITY_SCORE: 36
ADLS_ACUITY_SCORE: 37
ADLS_ACUITY_SCORE: 36
ADLS_ACUITY_SCORE: 36

## 2023-02-28 NOTE — PLAN OF CARE
Goal Outcome Evaluation:      Plan of Care Reviewed With: patient    Pertinent assessments: A&Ox4. Repeated requests. Afebrile. Denies pain. Tele shoes Afib. Bradycardic at times. BPs stable.  RA. Lungs diminished. Congested cough at times. Younker at bedside. Incontinent at times. Assist x1 with urinal and bedside commode. Brief in place. Brused skin throughout. Mepilex on bottom, right elbow, and spine. Nik x1 with gait belt and walker. PICC     Major Shift Events: -Participated with PT                                        Plan (Upcoming Events): Continue to monitor as TOF. Participate in PT to gain strength for discharge.     Discharge/Transfer Needs: Discharge to TCU?     1820- Transferred to 3rd floor. Gave hand-off report to Rita BURGESS

## 2023-02-28 NOTE — PLAN OF CARE
Speech Language Therapy Discharge Summary    Reason for therapy discharge:    All goals and outcomes met, no further needs identified.    Progress towards therapy goal(s). See goals on Care Plan in [facility] electronic health record for goal details.  Goals met    Therapy recommendation(s):    No further therapy is recommended. Pt is tolerating a regular diet and thin liquids, independently implementing safe swallow strategies.

## 2023-02-28 NOTE — PLAN OF CARE
Goal Outcome Evaluation:      Plan of Care Reviewed With: patient    Overall Patient Progress: improvingOverall Patient Progress: improving     A&O. VSS on room air. Tele afib down into the 30s- MD aware- CTM. BP stable. Congested cough, suctioning with yankuer independently. Incontinent of bowel, mostly continent of urine. Repositioning in bed with A1. mepilex in place to elbow and sacrum.  Continues on doxycycline, zosyn, decadron.

## 2023-02-28 NOTE — PROGRESS NOTES
[facility]  Hospitalist Progress Note  Zan Reina MD 02/28/2023    Reason for Stay (Diagnosis): Hypotension, COVID.         Assessment and Plan:      Summary of Stay: Issac Zelaya is a 88 year old male with a history of chronic atrial fibrillation on Xarelto for anticoagulation, hypertension, carotid artery stenosis, hyperlipidemia, obstructive sleep apnea, hypertension, chronic kidney disease and obesity who presents with significant fatigue, body aches and generalized weakness for the last 1-2 days.  He tested + for Covid 19.     Work up in the emergency room he was afebrile vital signs are stable.  EKG shows rate controlled atrial fibrillation.  Laboratory result was unremarkable with stable AUNG mild BNP elevation of 4774.  He had normal VBG with normal CBC.  Urinalysis was clear for infection but he did test positive for COVID, which is likely the cause of his weakness. CXR shows no PNA or edema.   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  He was recommended for admission for PT eval for safe disposition.     Since admit course was initially complicated by gross hematuria.  DOAC has been held and hematuria has improved/resolved.  hgb remained stable without significant blood loss    He has a significant cough productive of yellow sputum.  He has also developed loose BM since admit.    On the morning of 2/25, the patient developed acute hypotension after receiving his AM BP medications.  SBP dropped into the 70s.  An RRT was called.  After 2 liters of IVF his SBP improved to the low 100s.  He was given another 1 liter of IVF.   Lab work-up notable for slight lactic acid elevation that has since normalized.   hgb stable.  Troponin unremarkable.  ECG was done showing afib.  repeat CXR today showed possible developing infiltrate near R heart border.  Despite 3 liters of IVF his  BP was again dropping and plan is for transfer to ICU given persistent hypotension despite IVF resuscitation.      Initial working diagnosis was volume depletion and subsequent hypotension after receiving his morning anti-HTN meds.  However despite fluid hydration pressures remain soft and transfer to ICU warranted for monitoring and possible vasopressor need    Plans for today 2/28:  -Patient has been on IV antibiotics Zosyn and doxycycline since 2/25. Stop Zosyn.  -Continue Doxycycline treat for total of 7 days.  -Sputum culture grew Staph aureus and normal juliane.  -MRSA PCR negative.  -Follow-up blood cultures, so far negative.  -Continue dexamethasone in the setting of COVID, cough, hypoxia(now resolved).  -Blood pressure is holding, did not require pressors.  -procal 0.91, troponin down to 22.  -Urinalysis negative  -Lactic acid is normal.  -C. difficile test is negative, norovirus is not detected, enteric panel is negative.  -Echocardiogram reported EF of 55%, ascending aorta is mildly dilated at 4.1, grade 1 or early diastolic dysfunction.  -Continue Atenolol, decreased it to 25 mg daily from 50 mg with parameters.  episodes of Bradycardia at night as low as in the 30's..   -Patient is off supplemental oxygen.  -Can be transferred to general medical floor if she remains stable today.  -Xarelto restarted on 2/26.  -Resumed Plavix today/2/28 as there is no sign of bleeding..      #hypotension secondary to hypovolemia.  -Suspected due to volume depletion, possible sepsis consider volume depletion, sepsis, acute cardiac decompensation as possiblities  -Etiology of acute blood loss anemia which could contribute to hypotension.  -Unlikely to be PE as patient was on chronic DOAC use  -Echo showed EF of 55%  -Sepsis work-up in progress, UA is negative, viral panel negative, stool for C. difficile negative, other cultures results pending.  -Stop Zosyn, continue Doxycycline, treat for 7 days total.  -Continue  "Dexamethasone    #COVID infection.  # Possible staph Pneumonia vs colonization  - CXR does not show typical COVID changes  -He is off supplemental oxygen currently on room air  -Continue dexamethasone started on 2/25  -Holding off on remdesivir  -Covered for possible superimposed bacterial pneumonia  -Patient is fully vaccinated.  -Continue Doxycycline, discontinued Zosyn,    #Rate controlled A fib.   -Xarelto restarted.  -It was held earlier due to gross hematuria.  -Atenolol placed parameters for heart rate and blood pressure.  -Echo as reported.    CKD stage II.   -Creatinine stable 0.94, baseline.     #CVA/CAD   - PTA patient was on Plavix and Xarelto, which was held, now restarted Xarelto.  -Continue to hold Plavix for now, if there is no sign of bleeding and hemoglobin stable will restart on 2/28.    #Gross hematuria: Resolved  -Appreciate urology input  -DOAC and Plavix were held after admission, but  Rivaroxaban 2 days ago and Plavix started today.  -If the bleeding recurs consider stopping anticoagulation after discussing with patient and family.     Incidental findings  -Small fat-containing inguinal hernias.  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  -Mild pulmonary fibrosis.    Clinically Significant Risk Factors              # Hypoalbuminemia: Lowest albumin = 3 g/dL at 2/25/2023  2:16 PM, will monitor as appropriate           # Overweight: Estimated body mass index is 27.15 kg/m  as calculated from the following:    Height as of 2/1/23: 1.803 m (5' 11\").    Weight as of this encounter: 88.3 kg (194 lb 10.7 oz)., PRESENT ON ADMISSION         DVT Prophylaxis: Xarelto.  Code Status: Full Code.  Discharge Dispo: Patient is declining to go to TCU and if she is stable and does well with PT and OT, he can be discharged home in 2 days.  Patient can be transferred to general medical floor.    I discussed with patient at " length the plan of care, also discussed with ICU team.      Interval History (Subjective):      Patient seen and examined, no new overnight issues, feels better, cough improved, not on oxygen.he denies any fever or chills.stated he did not like his breakfast and also his coffee as there were cold, and requested to get hot meal and coffee.                  Physical Exam:      Last Vital Signs:  BP (!) 159/92   Pulse 63   Temp 97.8  F (36.6  C) (Temporal)   Resp 16   Wt 88.3 kg (194 lb 10.7 oz)   SpO2 98%   BMI 27.15 kg/m      Constitutional: Awake, alert, cooperative, no apparent distress   Respiratory: Clear to auscultation bilaterally, intermittent cough, no crackles or wheezing   Cardiovascular: Irreg rhythm, normal S1 and S2, and no murmur noted   Abdomen: Normal bowel sounds, soft, non-distended, non-tender   Skin: No rashes, no cyanosis, dry to touch   Neuro: Alert and oriented x3, no weakness, numbness, memory loss   Extremities: No edema, normal range of motion.   Other(s):        All other systems: Negative          Medications:      All current medications were reviewed with changes reflected in problem list.         Data:      All new lab and imaging data was reviewed.   Labs:  Recent Labs   Lab 02/28/23  0809 02/28/23  0206 02/27/23  0523 02/26/23  0744 02/26/23  0556 02/25/23  1501 02/25/23  1416   NA  --   --  139  --  139  --  135*   POTASSIUM  --   --  3.7  --  3.8  --  4.0   CHLORIDE  --   --  108*  --  108*  --  103   CO2  --   --  20*  --  20*  --  19*   ANIONGAP  --   --  11  --  11  --  13   GLC 84 135* 115*   < > 94   < > 89   BUN  --   --  27.8*  --  20.5  --  23.1*   CR  --   --  0.94  0.94  --  0.91  --  1.24*   GFRESTIMATED  --   --  78  78  --  82  --  56*   FILIPE  --   --  8.5*  --  8.2*  --  8.0*    < > = values in this interval not displayed.     Recent Labs   Lab 02/27/23 0523 02/26/23  0556   WBC  --  5.9   HGB  --  12.9*   HCT  --  38.2*   MCV  --  100   * 121*       Imaging:   Recent Results (from the past 24 hour(s))   XR Chest Port 1 View    Narrative    EXAM: XR CHEST PORT 1 VIEW  LOCATION: [facility]  DATE/TIME: 2/25/2023 10:27 AM    INDICATION: COVID 19 infection. Shortness of breath. Cough.  COMPARISON: 2/23/2023      Impression    IMPRESSION: Shallow inspiration. Cardiac silhouette size is unchanged and within normal limits. Subsegmental atelectasis, right lung base. Crowding of the pulmonary vascularity secondary to the shallow inspiration. Calcified atherosclerotic changes of   the aortic arch.

## 2023-02-28 NOTE — PROGRESS NOTES
"CLINICAL NUTRITION SERVICES  -  ASSESSMENT NOTE    Recommendations Ordered by Registered Dietitian (RD):   Diet per MD - plan to order oral nutritional supplements if PO intake declines further.    Malnutrition:   % Weight Loss:  Weight loss does not meet criteria for malnutrition   % Intake:  Decreased intake does not meet criteria for malnutrition   Subcutaneous Fat Loss: unable to assess - COVID-19   Muscle Loss:  unable to assess - COVID-19   Fluid Retention:  None noted    Malnutrition Diagnosis: Unable to determine due to lack of nutrition focused physical exam - COVID-19       REASON FOR ASSESSMENT  Issac Zelaya is a 88 year old male seen by Registered Dietitian for Admission Nutrition Risk Screen for positive    Past medical history: chronic atrial fibrillation on Xarelto for anticoagulation, hypertension, carotid artery stenosis, hyperlipidemia, obstructive sleep apnea, hypertension, chronic kidney disease and obesity    Admitted for: Generalized weakness, myalgia due to COVID-19     NUTRITION HISTORY  - Information obtained from patient via phone - COVID-19   - Typical food/fluid intake PTA: patient reports a good appetite at home, eating slightly less than normal \"subconsciously\" as he was wanting to loose a little weight. Typically consumes 2-3 meals per day at home prepared by his spouse vs out to eat. Notes that he spouse is always encouraging him to eat more.   - Supplements: none   - Food allergies: NKFA    CURRENT NUTRITION ORDERS  Diet Order:     Regular Diet     Current Intake/Tolerance:  One meal documented at 100% consumed. Ordering mostly 3 meals per day, one day only one meal was ordered (2/25).     Obtained from Chart/Interdisciplinary Team:  - RRT called on 2/25  - SLP following   - Reviewed stooling patterns  - WOC following, see note for more information     ANTHROPOMETRICS  Height: 5' 11 --> taken on 2/1/2023  Weight: 88.3 kg ( 194 lbs 10.66 oz)   Body mass index is 27.15 kg/m .  Weight " "Status:  Overweight BMI 25-29.9  Weight History: weight loss of 4.4 kg (5%) weight loss over the past ~ 4 months. Patient endorses unintentional weight loss however notes that he did want to loose weight and was \"subconsciously\" eating less.   Wt Readings from Last 10 Encounters:   02/28/23 88.3 kg (194 lb 10.7 oz)   02/01/23 89.9 kg (198 lb 4.8 oz)   11/08/22 92.7 kg (204 lb 4.8 oz)   11/24/21 107.3 kg (236 lb 9.6 oz)   10/12/21 107.5 kg (237 lb 1.6 oz)   11/05/20 112.9 kg (249 lb)   11/04/20 112.9 kg (249 lb)   08/22/19 116.6 kg (257 lb)   10/08/18 122.1 kg (269 lb 3.2 oz)   09/18/18 123.5 kg (272 lb 4.8 oz)     LABS  Labs reviewed    Labs:  Electrolytes  Potassium (mmol/L)   Date Value   02/27/2023 3.7   02/26/2023 3.8   02/25/2023 4.0   11/08/2022 4.6   10/01/2021 4.3   10/13/2020 4.3   06/20/2017 4.5   01/10/2017 4.5     Phosphorus (mg/dL)   Date Value   02/27/2023 2.3 (L)    Blood Glucose  Glucose (mg/dL)   Date Value   02/27/2023 115 (H)   02/26/2023 94   11/08/2022 100 (H)   10/01/2021 103 (H)   10/13/2020 110 (H)   06/20/2017 117 (H)   01/10/2017 103 (H)   07/11/2016 103 (H)   05/13/2014 101 (H)     GLUCOSE BY METER POCT (mg/dL)   Date Value   02/28/2023 84   02/28/2023 135 (H)   02/26/2023 93   02/26/2023 87   02/26/2023 90     Hemoglobin A1C (%)   Date Value   09/18/2018 5.8 (H)   04/15/2015 5.6   07/30/2013 5.4   08/30/2012 5.4    Inflammatory Markers  WBC (10e9/L)   Date Value   10/13/2020 7.8   10/08/2018 8.7   05/13/2014 7.6     WBC Count (10e3/uL)   Date Value   02/26/2023 5.9   02/25/2023 8.1   02/25/2023 7.4     Albumin (g/dL)   Date Value   02/25/2023 3.0 (L)   02/23/2023 3.7   10/13/2020 3.5   06/20/2017 3.5   07/11/2016 3.7      Magnesium (mg/dL)   Date Value   02/27/2023 1.8   02/25/2023 1.8     Sodium (mmol/L)   Date Value   02/27/2023 139   02/26/2023 139   02/25/2023 135 (L)   10/13/2020 137   06/20/2017 140   01/10/2017 138    Renal  Urea Nitrogen (mg/dL)   Date Value   02/27/2023 27.8 (H) "   02/26/2023 20.5   02/25/2023 23.1 (H)   11/08/2022 20   10/01/2021 19   10/13/2020 15   06/20/2017 18   01/10/2017 10     Creatinine (mg/dL)   Date Value   02/27/2023 0.94   02/27/2023 0.94   02/26/2023 0.91   10/13/2020 1.16   06/20/2017 1.09   01/10/2017 1.03     Additional  Triglycerides (mg/dL)   Date Value   11/08/2022 153 (H)   10/01/2021 192 (H)   10/13/2020 168 (H)   08/21/2019 191 (H)   06/20/2017 139     Ketones Urine (mg/dL)   Date Value   02/25/2023 Negative        MEDICATIONS  Medications reviewed      [Held by provider] atenolol  50 mg Oral Daily     atorvastatin  20 mg Oral QPM     [Held by provider] clopidogrel  75 mg Oral Daily     dexamethasone  6 mg Intravenous Q24H     doxycycline hyclate  100 mg Oral Q12H [facility] (08/20)     famotidine  20 mg Oral BID     lisinopril  5 mg Oral Daily     piperacillin-tazobactam  3.375 g Intravenous Q6H     rivaroxaban ANTICOAGULANT  15 mg Oral Daily with supper        norepinephrine        acetaminophen **OR** acetaminophen, albuterol, glucose **OR** dextrose **OR** glucagon, guaiFENesin, hydrOXYzine, lidocaine 4%, lidocaine (buffered or not buffered), melatonin, ondansetron **OR** ondansetron, senna-docusate **OR** senna-docusate, sodium chloride (PF), sodium chloride (PF)     ASSESSED NUTRITION NEEDS PER APPROVED PRACTICE GUIDELINES:    Dosing Weight 88.3 kg   Estimated Energy Needs: 3024-1910 kcals (20-25 Kcal/Kg)  Justification: maintenance  Estimated Protein Needs:  grams protein (1-1.2 g pro/Kg)  Justification: maintenance  Estimated Fluid Needs: per MD     NUTRITION DIAGNOSIS:  Predicted inadequate nutrient intake related to potential for PO intake to decline pending clinical course     NUTRITION INTERVENTIONS  Recommendations / Nutrition Prescription  Diet per MD - plan to order oral nutritional supplements if PO intake declines further.     Implementation  Nutrition education: Provided education on the importance of protein with meals to help with  preservation of lean body mass and for strength when working with PT   Collaboration and Referral of Nutrition care: Planning to discuss patient during interdisciplinary rounds this morning     Nutrition Goals  Patient to consume 75% of meals or oral nutritional supplements ordered TID    MONITORING AND EVALUATION:  Progress towards goals will be monitored and evaluated per protocol and Practice Guidelines    Jessica Richardson RD, LD  Clinical Dietitian     3rd floor/ICU: [phone]  All other floors: [phone]  Weekend/holiday: [phone]  Office: [phone]

## 2023-02-28 NOTE — PROVIDER NOTIFICATION
Notified harshad cardenas: pt had been HR afib in the 40s most of the evening, but has been dipping down to 36 or 37. bp stable. Please advise, thanks!    Discussed with MD Pack: holding AM atenolol, as long as mentating fine and BP stable, continue to monitor

## 2023-03-01 ENCOUNTER — APPOINTMENT (OUTPATIENT)
Dept: PHYSICAL THERAPY | Facility: CLINIC | Age: 88
DRG: 177 | End: 2023-03-01
Payer: COMMERCIAL

## 2023-03-01 ENCOUNTER — APPOINTMENT (OUTPATIENT)
Dept: OCCUPATIONAL THERAPY | Facility: CLINIC | Age: 88
DRG: 177 | End: 2023-03-01
Attending: INTERNAL MEDICINE
Payer: COMMERCIAL

## 2023-03-01 LAB
BACTERIA SPT CULT: ABNORMAL
BACTERIA SPT CULT: ABNORMAL
GRAM STAIN RESULT: ABNORMAL

## 2023-03-01 PROCEDURE — 99231 SBSQ HOSP IP/OBS SF/LOW 25: CPT | Mod: CS | Performed by: INTERNAL MEDICINE

## 2023-03-01 PROCEDURE — 250N000013 HC RX MED GY IP 250 OP 250 PS 637: Performed by: PHYSICIAN ASSISTANT

## 2023-03-01 PROCEDURE — 97530 THERAPEUTIC ACTIVITIES: CPT | Mod: GP

## 2023-03-01 PROCEDURE — 97165 OT EVAL LOW COMPLEX 30 MIN: CPT | Mod: GO | Performed by: OCCUPATIONAL THERAPIST

## 2023-03-01 PROCEDURE — 250N000013 HC RX MED GY IP 250 OP 250 PS 637: Performed by: INTERNAL MEDICINE

## 2023-03-01 PROCEDURE — 97535 SELF CARE MNGMENT TRAINING: CPT | Mod: GO | Performed by: OCCUPATIONAL THERAPIST

## 2023-03-01 PROCEDURE — 250N000013 HC RX MED GY IP 250 OP 250 PS 637: Performed by: EMERGENCY MEDICINE

## 2023-03-01 PROCEDURE — 120N000001 HC R&B MED SURG/OB

## 2023-03-01 PROCEDURE — 250N000011 HC RX IP 250 OP 636: Performed by: INTERNAL MEDICINE

## 2023-03-01 RX ADMIN — FAMOTIDINE 20 MG: 20 TABLET, FILM COATED ORAL at 08:46

## 2023-03-01 RX ADMIN — DOXYCYCLINE HYCLATE 100 MG: 100 CAPSULE ORAL at 21:36

## 2023-03-01 RX ADMIN — ATENOLOL 25 MG: 25 TABLET ORAL at 08:48

## 2023-03-01 RX ADMIN — CLOPIDOGREL BISULFATE 75 MG: 75 TABLET ORAL at 08:48

## 2023-03-01 RX ADMIN — RIVAROXABAN 15 MG: 15 TABLET, FILM COATED ORAL at 16:58

## 2023-03-01 RX ADMIN — DOXYCYCLINE HYCLATE 100 MG: 100 CAPSULE ORAL at 08:49

## 2023-03-01 RX ADMIN — FAMOTIDINE 20 MG: 20 TABLET, FILM COATED ORAL at 21:36

## 2023-03-01 RX ADMIN — ATORVASTATIN CALCIUM 20 MG: 20 TABLET, FILM COATED ORAL at 21:36

## 2023-03-01 RX ADMIN — DEXAMETHASONE SODIUM PHOSPHATE 6 MG: 10 INJECTION INTRAMUSCULAR; INTRAVENOUS at 13:46

## 2023-03-01 RX ADMIN — LISINOPRIL 5 MG: 5 TABLET ORAL at 08:49

## 2023-03-01 ASSESSMENT — ACTIVITIES OF DAILY LIVING (ADL)
PREVIOUS_RESPONSIBILITIES: MEAL PREP;HOUSEKEEPING;LAUNDRY;SHOPPING;MEDICATION MANAGEMENT;FINANCES;DRIVING
ADLS_ACUITY_SCORE: 36

## 2023-03-01 NOTE — PLAN OF CARE
Goal Outcome Evaluation:         Patient alert and oriented, on RA, denies pain, no acute needs seen this NOC will monitor

## 2023-03-01 NOTE — PROGRESS NOTES
03/01/23 1600   Appointment Info   Signing Clinician's Name / Credentials (OT) Lindsey Pierre, OTRL   Living Environment   People in Home spouse   Current Living Arrangements condominium   Home Accessibility no concerns   Transportation Anticipated family or friend will provide   Living Environment Comments Pt lives in single story town home, threshold step to get inside, everything on 1 level. Pt's wife unable to provide physical assistance per pt.   Self-Care   Usual Activity Tolerance good   Current Activity Tolerance moderate   Regular Exercise No   Equipment Currently Used at Home cane, straight   Fall history within last six months no   Activity/Exercise/Self-Care Comment Pt is IND with functional mobility and ADLs at baseline, uses SEC outside of home, no AD inside home. Does not own other AE, appears wife uses walker.   Instrumental Activities of Daily Living (IADL)   Previous Responsibilities meal prep;housekeeping;laundry;shopping;medication management;finances;driving   IADL Comments Unclear if spouse is able to assist with IADLs   General Information   Onset of Illness/Injury or Date of Surgery 02/23/23   Referring Physician Zan Reina MD   Patient/Family Therapy Goal Statement (OT) Patient would like to return home   Additional Occupational Profile Info/Pertinent History of Current Problem 88 year old male with a history of chronic atrial fibrillation on Xarelto for anticoagulation, hypertension, carotid artery stenosis, hyperlipidemia, obstructive sleep apnea, hypertension, chronic kidney disease and obesity who presents with significant fatigue, body aches and generalized weakness for the last 1-2 days.  He tested + for Covid 19.   Existing Precautions/Restrictions fall   Limitations/Impairments safety/cognitive   Cognitive Status Examination   Orientation Status orientation to person, place and time   Affect/Mental Status (Cognitive) confused   Follows Commands WFL   Safety Deficit  minimal deficit;awareness of need for assistance;insight into deficits/self-awareness;judgment;problem-solving   Memory Deficit moderate deficit   Attention Deficit minimal deficit   Executive Function Deficit minimal deficit   Cognitive Status Comments Pt scored -8 on Short Blessed indicating questionable cognition with deficits noted with problem solving and memory. Further assessment required. Pt appears to lack insight into deficits   Visual Perception   Visual Impairment/Limitations corrective lenses full-time   Pain Assessment   Patient Currently in Pain No   Posture   Posture forward head position;protracted shoulders   Range of Motion Comprehensive   Comment, General Range of Motion WFL   Strength Comprehensive (MMT)   Comment, General Manual Muscle Testing (MMT) Assessment Global weakness, poor activity tolerance   Bed Mobility   Comment (Bed Mobility) PT seated in chair, unable to assess this date. Will assess at a later time   Transfers   Transfer Comments CGA from tall surfaces, min-mod from low surfaces   Balance   Balance Comments Impaired in standing with gait aid   Activities of Daily Living   BADL Assessment/Intervention bathing;lower body dressing;grooming;toileting   Bathing Assessment/Intervention   Comment, (Bathing) Min assist for transfer, poor tolerance for bathing   Lower Body Dressing Assessment/Training   Comment, (Lower Body Dressing) Min assist   Grooming Assessment/Training   Comment, (Grooming) CGA, poor tolerance for g/h in standing   Toileting   Comment, (Toileting) Mod assist   Clinical Impression   Criteria for Skilled Therapeutic Interventions Met (OT) Yes, treatment indicated   OT Diagnosis Decline in aDL independence and safetuy   Influenced by the following impairments Covid, weakness, cognitive decline   OT Problem List-Impairments impacting ADL problems related to;activity tolerance impaired;balance;cognition;mobility;strength   Assessment of Occupational Performance 3-5  Performance Deficits   Identified Performance Deficits Impaired dressing bathing toileting and grooming with cognitive decline   Planned Therapy Interventions (OT) ADL retraining;cognition;progressive activity/exercise   Clinical Decision Making Complexity (OT) low complexity   Anticipated Equipment Needs Upon Discharge (OT)   (TBD)   Risk & Benefits of therapy have been explained evaluation/treatment results reviewed;care plan/treatment goals reviewed;risks/benefits reviewed;current/potential barriers reviewed;participants voiced agreement with care plan;participants included;patient   OT Total Evaluation Time   OT Eval, Low Complexity Minutes (95822) 10   OT Goals   Therapy Frequency (OT) 5 times/wk   OT Predicted Duration/Target Date for Goal Attainment 03/06/23   OT Goals Hygiene/Grooming;Lower Body Dressing;Toilet Transfer/Toileting;Cognition   OT: Hygiene/Grooming supervision/stand-by assist;while standing  (Pt will tolerate 10+ minutes of standing during ADLs to progress activity tolerance needed for ADL independence)   OT: Lower Body Dressing Modified independent;using adaptive equipment;including set-up/clothing retrieval   OT: Toilet Transfer/Toileting Modified independent;cleaning and garment management;toilet transfer;using adaptive equipment   OT: Cognitive Patient/caregiver will verbalize understanding of cognitive assessment results/recommendations as needed for safe discharge planning   Self-Care/Home Management   Self-Care/Home Mgmt/ADL, Compensatory, Meal Prep Minutes (47342) 24   Symptoms Noted During/After Treatment (Meal Preparation/Planning Training) fatigue   Treatment Detail/Skilled Intervention OT; Pt seated upright in chair, agreeable to therapy. Pt attempted to don socks, pt educated on figure four to improve sock donning independence. After education pt able to ocmpelte with cues for technique. Pt completed sit t ostand from higher chair wit hcignacio for hand placement for safety. Pt  demonstrated mild instability during turns requiring CGA and cues for stability. Pt completed stand to sit transfer on toilet with cues for hand placement and alignment for safety. Pt attempted to stand from toilet without assist, unable to complete. Pt required mod assist to stand from toilet,. Pt returned to chair. PT edcuated on results of the Short Blessed including problem solving and STM deficits. Pt seated in chair with alarm on and betty llight upon OT departure. Pt demonstrates poor insight into deficits reporting to wife via phone that he stood independently from toilet.   OT Discharge Planning   OT Plan OT; SLUMS, activity tolerance/g/h at the sink, LB dressing, toilet transfer from low toilet height (pt has a standard toilet at home)   OT Discharge Recommendation (DC Rec) Transitional Care Facility   OT Rationale for DC Rec Pt demonstrates poor insight into deficits and safety requiring cues to maintain balance and safety during session. Pt would benefit from continued OT in TCU to return to prior level of function as pt has poor caregiver support at home.   OT Brief overview of current status Mod assist stand from toilet, -8 on Short Blessed cognitive assessment demonstrating cognitive deficits

## 2023-03-01 NOTE — PROGRESS NOTES
Care Management Follow Up    Length of Stay (days): 4    Expected Discharge Date: 02/27/2023     Concerns to be Addressed: care coordination/care conferences, discharge planning     Patient plan of care discussed at interdisciplinary rounds: Yes    Anticipated Discharge Disposition: Home, Home Care     Anticipated Discharge Services: None  Anticipated Discharge DME: None    Patient/family educated on Medicare website which has current facility and service quality ratings: no  Education Provided on the Discharge Plan:  Yes  Patient/Family in Agreement with the Plan: no    Referrals Placed by CM/SW:  None  Private pay costs discussed: Not applicable    Additional Information:  Care management following for plan of care and discharge planning. TCU recommended.  Spoke with patient over the phone briefly. Patient continues to decline TCU. Discussed recommendation of home care and coverage through insurance. Patient declines to discuss discharge planning further at this time.  Patient notes that wife and son both have Covid. Patient agreeable to CM contacting family to discuss discharge planning.     Update 1520: Spoke with patient's wife, Mary, over the phone. Reviewed discharge recommendations. Mary stated would be able to provide physical assistance and household management for patient upon discharge. Mary is declining home care for the patient.     CM will continue to follow for recommendations and discharge planning.       Dona Olivas, RN      Doan Oilvas RN Case Manager  Inpatient Care Coordination  [facility]   [phone]

## 2023-03-01 NOTE — PROGRESS NOTES
[facility]  Hospitalist Progress Note  Zan Reina MD 03/01/2023    Reason for Stay (Diagnosis): Hypotension, COVID infection.         Assessment and Plan:      Summary of Stay: Issac Zelaya is a 88 year old male with a history of chronic atrial fibrillation on Xarelto for anticoagulation, hypertension, carotid artery stenosis, hyperlipidemia, obstructive sleep apnea, hypertension, chronic kidney disease and obesity who presents with significant fatigue, body aches and generalized weakness for the last 1-2 days.  He tested + for Covid 19.     Work up in the emergency room he was afebrile vital signs are stable.  EKG shows rate controlled atrial fibrillation.  Laboratory result was unremarkable with stable AUNG mild BNP elevation of 4774.  He had normal VBG with normal CBC.  Urinalysis was clear for infection but he did test positive for COVID, which is likely the cause of his weakness. CXR shows no PNA or edema.   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  He was recommended for admission for PT eval for safe disposition.     Since admit course was initially complicated by gross hematuria.  DOAC has been held and hematuria has improved/resolved.  hgb remained stable without significant blood loss    He has a significant cough productive of yellow sputum.  He has also developed loose BM since admit.    On the morning of 2/25, the patient developed acute hypotension after receiving his AM BP medications.  SBP dropped into the 70s.  An RRT was called.  After 2 liters of IVF his SBP improved to the low 100s.  He was given another 1 liter of IVF.   Lab work-up notable for slight lactic acid elevation that has since normalized.   hgb stable.  Troponin unremarkable.  ECG was done showing afib.  repeat CXR today showed possible developing infiltrate near R heart border.  Despite 3 liters of  IVF his BP was again dropping and plan is for transfer to ICU given persistent hypotension despite IVF resuscitation.      Initial working diagnosis was volume depletion and subsequent hypotension after receiving his morning anti-HTN meds.  However despite fluid hydration pressures remain soft and transfer to ICU warranted for monitoring and possible vasopressor need    Plans for today 3/1:  -Continue doxycycline 100 mg twice a day.  -Zosyn discontinued on 2/28.  -Sputum culture grew Staph aureus and normal juliane.  -MRSA PCR negative.  -Blood cultures remain negative.  -Continue dexamethasone in the setting of COVID, cough, hypoxia(now resolved).  -Blood pressure is stable.  --Lactic acid is normal.  -C. difficile test is negative, norovirus is not detected, enteric panel is negative.  -Echocardiogram reported EF of 55%, ascending aorta is mildly dilated at 4.1, grade 1 or early diastolic dysfunction.  -Continue Atenolol, decreased it to 25 mg daily from 50 mg with parameters.  episodes of Bradycardia at night as low as in the 30's..   -Patient remained on room air, check his oxygen with activity.  -Xarelto restarted on 2/26.  -Resumed Plavix today/2/28 as there is no sign of bleeding.  -Working with PT and OT, for safe discharge plan, TCU recommended.  -Evaluated by SLP, tolerated regular diet.  -Patient can be discharged when placement is available to TCU.  -Incentive spirometry at bedside    #hypotension secondary to hypovolemia.  -Suspected due to volume depletion, possible sepsis consider volume depletion, sepsis, acute cardiac decompensation as possiblities  -Etiology of acute blood loss anemia which could contribute to hypotension.  -Unlikely to be PE as patient was on chronic DOAC use  -Echo showed EF of 55%  -Sepsis work-up in progress, UA is negative, viral panel negative, stool for C. difficile negative, other cultures results pending.  -Stop Zosyn, continue Doxycycline, treat for 7 days total.  -Continue  "Dexamethasone    #COVID infection.  # Possible staph Pneumonia vs colonization  - CXR does not show typical COVID changes  -He is off supplemental oxygen currently on room air  -Continue dexamethasone started on 2/25  -Holding off on remdesivir  -Covered for possible superimposed bacterial pneumonia  -Patient is fully vaccinated.  -Continue Doxycycline.    #Rate controlled A fib.   -Xarelto restarted.  -It was held earlier due to gross hematuria.  -Atenolol placed parameters for heart rate and blood pressure.  -Echo as reported.    CKD stage II.   -Creatinine stable 0.94, baseline.     #CVA/CAD   - PTA patient was on Plavix and Xarelto, which was held, now restarted Xarelto.  -Continue to hold Plavix for now, if there is no sign of bleeding and hemoglobin stable will restart on 2/28.    #Gross hematuria: Resolved  -Appreciate urology input  -DOAC and Plavix were held after admission, but  Rivaroxaban 2 days ago and Plavix started today.  -If the bleeding recurs consider stopping anticoagulation after discussing with patient and family.     Incidental findings  -Small fat-containing inguinal hernias.  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  -Mild pulmonary fibrosis.    Clinically Significant Risk Factors              # Hypoalbuminemia: Lowest albumin = 3 g/dL at 2/25/2023  2:16 PM, will monitor as appropriate           # Overweight: Estimated body mass index is 27.27 kg/m  (pended) as calculated from the following:    Height as of 2/1/23: 1.803 m (5' 11\").    Weight as of this encounter: (P) 88.7 kg (195 lb 8 oz)., PRESENT ON ADMISSION         DVT Prophylaxis: Xarelto.  Code Status: Full Code.  Discharge Dispo: Will likely need TCU recommendation, continue PT OT while in the hospital for improvement, patient was reluctant to discharge to TCU and needs further discussion.    I discussed with patient at length the plan of " care, also discussed with patient's RN .      Interval History (Subjective):      Patient seen and examined, no new overnight issues, transferred to the floor from ICU, feels better, cough improved, not on oxygen. He denies any fever or chills, tolerating oral intake, able to transfer from bed to chair.                  Physical Exam:      Last Vital Signs:  /69   Pulse 82   Temp 97.8  F (36.6  C) (Oral)   Resp 18   Wt (P) 88.7 kg (195 lb 8 oz)   SpO2 98%   BMI (P) 27.27 kg/m      Constitutional: Awake, alert, cooperative, no apparent distress   Respiratory: Clear to auscultation bilaterally, intermittent cough, scatted crackles or wheezing   Cardiovascular: Irreg rhythm, normal S1 and S2, and no murmur noted   Abdomen: Normal bowel sounds, soft, non-distended, non-tender   Skin: No rashes, no cyanosis, dry to touch   Neuro: Alert and oriented x3, no weakness, numbness, memory loss   Extremities: No edema, normal range of motion.   Other(s):        All other systems: Negative          Medications:      All current medications were reviewed with changes reflected in problem list.         Data:      All new lab and imaging data was reviewed.   Labs:  Recent Labs   Lab 02/28/23  0809 02/28/23  0206 02/27/23  0523 02/26/23  0744 02/26/23  0556 02/25/23  1501 02/25/23  1416   NA  --   --  139  --  139  --  135*   POTASSIUM  --   --  3.7  --  3.8  --  4.0   CHLORIDE  --   --  108*  --  108*  --  103   CO2  --   --  20*  --  20*  --  19*   ANIONGAP  --   --  11  --  11  --  13   GLC 84 135* 115*   < > 94   < > 89   BUN  --   --  27.8*  --  20.5  --  23.1*   CR  --   --  0.94  0.94  --  0.91  --  1.24*   GFRESTIMATED  --   --  78  78  --  82  --  56*   FILIPE  --   --  8.5*  --  8.2*  --  8.0*    < > = values in this interval not displayed.     Recent Labs   Lab 02/27/23  0523 02/26/23  0556   WBC  --  5.9   HGB  --  12.9*   HCT  --  38.2*   MCV  --  100   * 121*      Imaging:   Recent Results (from the  past 24 hour(s))   XR Chest Port 1 View    Narrative    EXAM: XR CHEST PORT 1 VIEW  LOCATION: [facility]  DATE/TIME: 2/25/2023 10:27 AM    INDICATION: COVID 19 infection. Shortness of breath. Cough.  COMPARISON: 2/23/2023      Impression    IMPRESSION: Shallow inspiration. Cardiac silhouette size is unchanged and within normal limits. Subsegmental atelectasis, right lung base. Crowding of the pulmonary vascularity secondary to the shallow inspiration. Calcified atherosclerotic changes of   the aortic arch.

## 2023-03-01 NOTE — PROGRESS NOTES
Patient Transfer Information  Patient connected to monitoring equipment on arrival: yes Vital signs monitor and Continuous pulse oximetry     Patient connected to wall oxygen on arrival: N/A    Belongings: Transferred with patient    Safety check completed: Yes

## 2023-03-02 ENCOUNTER — APPOINTMENT (OUTPATIENT)
Dept: OCCUPATIONAL THERAPY | Facility: CLINIC | Age: 88
DRG: 177 | End: 2023-03-02
Payer: COMMERCIAL

## 2023-03-02 ENCOUNTER — APPOINTMENT (OUTPATIENT)
Dept: PHYSICAL THERAPY | Facility: CLINIC | Age: 88
DRG: 177 | End: 2023-03-02
Payer: COMMERCIAL

## 2023-03-02 LAB
BACTERIA BLD CULT: NO GROWTH
BACTERIA BLD CULT: NO GROWTH
CREAT SERPL-MCNC: 0.86 MG/DL (ref 0.67–1.17)
GFR SERPL CREATININE-BSD FRML MDRD: 84 ML/MIN/1.73M2
PLATELET # BLD AUTO: 141 10E3/UL (ref 150–450)

## 2023-03-02 PROCEDURE — 97130 THER IVNTJ EA ADDL 15 MIN: CPT | Mod: GO

## 2023-03-02 PROCEDURE — 97129 THER IVNTJ 1ST 15 MIN: CPT | Mod: GO

## 2023-03-02 PROCEDURE — 97116 GAIT TRAINING THERAPY: CPT | Mod: GP | Performed by: PHYSICAL THERAPIST

## 2023-03-02 PROCEDURE — 97535 SELF CARE MNGMENT TRAINING: CPT | Mod: GO

## 2023-03-02 PROCEDURE — 250N000013 HC RX MED GY IP 250 OP 250 PS 637: Performed by: INTERNAL MEDICINE

## 2023-03-02 PROCEDURE — 250N000013 HC RX MED GY IP 250 OP 250 PS 637: Performed by: PHYSICIAN ASSISTANT

## 2023-03-02 PROCEDURE — 120N000001 HC R&B MED SURG/OB

## 2023-03-02 PROCEDURE — 82565 ASSAY OF CREATININE: CPT | Performed by: EMERGENCY MEDICINE

## 2023-03-02 PROCEDURE — 99232 SBSQ HOSP IP/OBS MODERATE 35: CPT | Mod: CS | Performed by: INTERNAL MEDICINE

## 2023-03-02 PROCEDURE — 97530 THERAPEUTIC ACTIVITIES: CPT | Mod: GP | Performed by: PHYSICAL THERAPIST

## 2023-03-02 PROCEDURE — 85049 AUTOMATED PLATELET COUNT: CPT | Performed by: EMERGENCY MEDICINE

## 2023-03-02 PROCEDURE — 250N000013 HC RX MED GY IP 250 OP 250 PS 637: Performed by: EMERGENCY MEDICINE

## 2023-03-02 PROCEDURE — 250N000011 HC RX IP 250 OP 636: Performed by: INTERNAL MEDICINE

## 2023-03-02 RX ORDER — DOXYCYCLINE 100 MG/1
100 CAPSULE ORAL EVERY 12 HOURS SCHEDULED
Status: COMPLETED | OUTPATIENT
Start: 2023-03-02 | End: 2023-03-02

## 2023-03-02 RX ADMIN — LISINOPRIL 5 MG: 5 TABLET ORAL at 08:26

## 2023-03-02 RX ADMIN — RIVAROXABAN 15 MG: 15 TABLET, FILM COATED ORAL at 17:06

## 2023-03-02 RX ADMIN — ATENOLOL 25 MG: 25 TABLET ORAL at 08:26

## 2023-03-02 RX ADMIN — DEXAMETHASONE SODIUM PHOSPHATE 6 MG: 10 INJECTION INTRAMUSCULAR; INTRAVENOUS at 13:57

## 2023-03-02 RX ADMIN — ACETAMINOPHEN 325 MG: 325 TABLET, FILM COATED ORAL at 11:45

## 2023-03-02 RX ADMIN — DOXYCYCLINE HYCLATE 100 MG: 100 CAPSULE ORAL at 08:26

## 2023-03-02 RX ADMIN — DOXYCYCLINE HYCLATE 100 MG: 100 CAPSULE ORAL at 20:25

## 2023-03-02 RX ADMIN — FAMOTIDINE 20 MG: 20 TABLET, FILM COATED ORAL at 20:25

## 2023-03-02 RX ADMIN — ATORVASTATIN CALCIUM 20 MG: 20 TABLET, FILM COATED ORAL at 20:25

## 2023-03-02 RX ADMIN — FAMOTIDINE 20 MG: 20 TABLET, FILM COATED ORAL at 08:26

## 2023-03-02 RX ADMIN — CLOPIDOGREL BISULFATE 75 MG: 75 TABLET ORAL at 08:26

## 2023-03-02 ASSESSMENT — ACTIVITIES OF DAILY LIVING (ADL)
ADLS_ACUITY_SCORE: 36
ADLS_ACUITY_SCORE: 36
ADLS_ACUITY_SCORE: 32
ADLS_ACUITY_SCORE: 36
ADLS_ACUITY_SCORE: 36
ADLS_ACUITY_SCORE: 32
ADLS_ACUITY_SCORE: 36
ADLS_ACUITY_SCORE: 36
ADLS_ACUITY_SCORE: 32
ADLS_ACUITY_SCORE: 36
ADLS_ACUITY_SCORE: 32
ADLS_ACUITY_SCORE: 32

## 2023-03-02 NOTE — PLAN OF CARE
Patient is Afebrile and A & O x 4. No pain this shift. Cardiac: BP stable Tele Afib most of shift. He did have a 2.5 second pause around 1600 and HR got down to 31.  I was notified much later in shift and he has been back to base since then.  Patient has been up to chair most of the afternoon and worked with PT and OT  as well.  : adequate UO. GI: 1 BM today. Regular diet. On RA stating mid 90's.

## 2023-03-02 NOTE — PROGRESS NOTES
Pt is alert and oriented, lung sounds diminished,infrequent cough,RA, up with assist of one walker and gait.Tolerating regular diet. Tele monitor. Bradycardic.Continues on decadron,Doxy,PICC to Upper right arm.Voiding using a urinal/bathroom,Plan to discharge to home possibly today.

## 2023-03-02 NOTE — PROGRESS NOTES
[facility]  Hospitalist Progress Note  Zan Reina MD 03/02/2023    Reason for Stay (Diagnosis): Hypotension, COVID infection.         Assessment and Plan:      Summary of Stay: Issac Zelaya is a 88 year old male with a history of chronic atrial fibrillation on Xarelto for anticoagulation, hypertension, carotid artery stenosis, hyperlipidemia, obstructive sleep apnea, hypertension, chronic kidney disease and obesity who presents with significant fatigue, body aches and generalized weakness for the last 1-2 days.  He tested + for Covid 19.     Work up in the emergency room he was afebrile vital signs are stable.  EKG shows rate controlled atrial fibrillation.  Laboratory result was unremarkable with stable AUNG mild BNP elevation of 4774.  He had normal VBG with normal CBC.  Urinalysis was clear for infection but he did test positive for COVID, which is likely the cause of his weakness. CXR shows no PNA or edema.   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  He was recommended for admission for PT eval for safe disposition.     Since admit course was initially complicated by gross hematuria.  DOAC has been held and hematuria has improved/resolved.  hgb remained stable without significant blood loss    He has a significant cough productive of yellow sputum.  He has also developed loose BM since admit.    On the morning of 2/25, the patient developed acute hypotension after receiving his AM BP medications.  SBP dropped into the 70s.  An RRT was called.  After 2 liters of IVF his SBP improved to the low 100s.  He was given another 1 liter of IVF.   Lab work-up notable for slight lactic acid elevation that has since normalized.   hgb stable.  Troponin unremarkable.  ECG was done showing afib.  repeat CXR today showed possible developing infiltrate near R heart border.  Despite 3 liters of  IVF his BP was again dropping and plan is for transfer to ICU given persistent hypotension despite IVF resuscitation.      Initial working diagnosis was volume depletion and subsequent hypotension after receiving his morning anti-HTN meds.  However despite fluid hydration pressures remain soft and transfer to ICU warranted for monitoring and possible vasopressor need    Plans for today 3/2:  -Treated with doxycycline 100 mg twice daily, started 2/25, will completer duration of therapy today.  -Zosyn discontinued on 2/28.  -Sputum culture grew Staph aureus and normal juliane.  -MRSA PCR negative.  -Blood cultures remain negative.  -Stop dexamethasone after today's dose for COVID-19 infection, cough and hypoxia (now resolved).  -Blood pressure is stable.  -Lactic acid is normal.  -C. difficile test is negative, norovirus is not detected, enteric panel is negative.  -Echocardiogram reported EF of 55%, ascending aorta is mildly dilated at 4.1, grade 1 or early diastolic dysfunction.  -Atenolol dose decreased to 25 mg daily from 50 mg with parameters due to episodes of bradycardia.  -Patient remained on room air, check his oxygen with activity.  -Xarelto restarted on 2/26.  -Continue Plavix, restarted /2/28, no sign of bleeding from any site .  -Patient is working with PT/OT for safe discharge plan, TCU recommended.  -Evaluated by SLP, tolerated regular diet.  -Patient can be discharged when placement is available to TCU.  -Incentive spirometry at bedside    #hypotension secondary to hypovolemia.  -Resolved, blood pressure is stable, patient is back on atenolol at lower dose.  -Initially suspected due to volume depletion versus sepsis.  -PE was also considered but patient was on chronic DOAC use  -Echo showed EF of 55%  -Sepsis work-up did not show any infection other than a sputum positive staph.  -UA, viral panel negative, stool for C. difficile negative.      #COVID infection.  # Possible staph Pneumonia vs  "colonization  - CXR does not show typical COVID changes  -He is off supplemental oxygen currently on room air  -Continue dexamethasone started on 2/25, for 10 days total  -Patient was not given remdesivir  -Covered for possible superimposed bacterial pneumonia  -Patient is fully vaccinated.  -Stop Doxycycline after this PM dose.    #Rate controlled A fib.   -Xarelto restarted.  -It was held earlier due to gross hematuria.  -Atenolol placed parameters for heart rate and blood pressure.  -Echo as reported.    CKD stage II.   -Creatinine stable 0.94, baseline.     #CVA/CAD   - PTA patient was on Plavix and Xarelto, which was held, now restarted Xarelto.  -Continue to hold Plavix for now, if there is no sign of bleeding and hemoglobin stable will restart on 2/28.    #Gross hematuria: Resolved  -Appreciate urology input  -DOAC and Plavix were held after admission, but  Rivaroxaban 2 days ago and Plavix started today.  -If the bleeding recurs consider stopping anticoagulation after discussing with patient and family.     Incidental findings  -Small fat-containing inguinal hernias.  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  -Mild pulmonary fibrosis.    Clinically Significant Risk Factors              # Hypoalbuminemia: Lowest albumin = 3 g/dL at 2/25/2023  2:16 PM, will monitor as appropriate           # Overweight: Estimated body mass index is 27.36 kg/m  as calculated from the following:    Height as of 2/1/23: 1.803 m (5' 11\").    Weight as of this encounter: 89 kg (196 lb 3.2 oz).          DVT Prophylaxis: Xarelto.  Code Status: Full Code.  Discharge Dispo: Patient refusing to go to TCU again today, OT recommended TCU.  Will be discharged on 3/3/2023.  Patient continues to improve we will likely discharge him tomorrow to home.  Patient is also refusing home health care.    I discussed with patient at length the plan of care, " also discussed with patient's RN .      Interval History (Subjective):      Patient seen and examined, no new overnight issues, feels better, cough improved, not on oxygen. He denies any fever or chills, tolerating oral intake, able to transfer from bed to chair.  This morning he was sitting on a chair.                  Physical Exam:      Last Vital Signs:  BP (!) 163/84 (BP Location: Left arm)   Pulse 62   Temp 98  F (36.7  C) (Oral)   Resp 18   Wt 89 kg (196 lb 3.2 oz)   SpO2 97%   BMI 27.36 kg/m      Constitutional: Awake, alert, cooperative, no apparent distress   Respiratory: Clear to auscultation bilaterally, intermittent cough, scatted crackles or wheezing   Cardiovascular: Irreg rhythm, normal S1 and S2, and no murmur noted   Abdomen: Normal bowel sounds, soft, non-distended, non-tender   Skin: No rashes, no cyanosis, dry to touch   Neuro: Alert and oriented x3, no weakness, numbness, memory loss   Extremities: No edema, normal range of motion.   Other(s):        All other systems: Negative          Medications:      All current medications were reviewed with changes reflected in problem list.         Data:      All new lab and imaging data was reviewed.   Labs:  Recent Labs   Lab 03/02/23  0549 02/28/23  0809 02/28/23  0206 02/27/23  0523 02/26/23  0744 02/26/23  0556 02/25/23  1501 02/25/23  1416   NA  --   --   --  139  --  139  --  135*   POTASSIUM  --   --   --  3.7  --  3.8  --  4.0   CHLORIDE  --   --   --  108*  --  108*  --  103   CO2  --   --   --  20*  --  20*  --  19*   ANIONGAP  --   --   --  11  --  11  --  13   GLC  --  84 135* 115*   < > 94   < > 89   BUN  --   --   --  27.8*  --  20.5  --  23.1*   CR 0.86  --   --  0.94  0.94  --  0.91  --  1.24*   GFRESTIMATED 84  --   --  78  78  --  82  --  56*   FILIPE  --   --   --  8.5*  --  8.2*  --  8.0*    < > = values in this interval not displayed.     Recent Labs   Lab 03/02/23  0549 02/27/23  0523 02/26/23  0556   WBC  --   --  5.9   HGB   --   --  12.9*   HCT  --   --  38.2*   MCV  --   --  100   *   < > 121*    < > = values in this interval not displayed.      Imaging:   Recent Results (from the past 24 hour(s))   XR Chest Port 1 View    Narrative    EXAM: XR CHEST PORT 1 VIEW  LOCATION: [facility]  DATE/TIME: 2/25/2023 10:27 AM    INDICATION: COVID 19 infection. Shortness of breath. Cough.  COMPARISON: 2/23/2023      Impression    IMPRESSION: Shallow inspiration. Cardiac silhouette size is unchanged and within normal limits. Subsegmental atelectasis, right lung base. Crowding of the pulmonary vascularity secondary to the shallow inspiration. Calcified atherosclerotic changes of   the aortic arch.

## 2023-03-02 NOTE — PLAN OF CARE
Cared for [id number]    Pt A/O x 4, VSS, pt denies pain, headache, dizziness, N/V & SOB. Pt Asst: 1 w/gait belt & walker. Lung sounds diminished, RA. Tele: Afib CVR (pauses overnight). PT, OT & WOC following. Will continue with plan of care.

## 2023-03-03 VITALS
TEMPERATURE: 97.5 F | SYSTOLIC BLOOD PRESSURE: 143 MMHG | BODY MASS INDEX: 27.06 KG/M2 | WEIGHT: 194 LBS | DIASTOLIC BLOOD PRESSURE: 98 MMHG | OXYGEN SATURATION: 100 % | RESPIRATION RATE: 20 BRPM | HEART RATE: 63 BPM

## 2023-03-03 LAB
ERYTHROCYTE [DISTWIDTH] IN BLOOD BY AUTOMATED COUNT: 13 % (ref 10–15)
HCT VFR BLD AUTO: 37.3 % (ref 40–53)
HGB BLD-MCNC: 12.4 G/DL (ref 13.3–17.7)
MCH RBC QN AUTO: 32.7 PG (ref 26.5–33)
MCHC RBC AUTO-ENTMCNC: 33.2 G/DL (ref 31.5–36.5)
MCV RBC AUTO: 98 FL (ref 78–100)
PLATELET # BLD AUTO: 189 10E3/UL (ref 150–450)
RBC # BLD AUTO: 3.79 10E6/UL (ref 4.4–5.9)
WBC # BLD AUTO: 10.8 10E3/UL (ref 4–11)

## 2023-03-03 PROCEDURE — 99239 HOSP IP/OBS DSCHRG MGMT >30: CPT | Mod: CS | Performed by: INTERNAL MEDICINE

## 2023-03-03 PROCEDURE — 250N000013 HC RX MED GY IP 250 OP 250 PS 637: Performed by: EMERGENCY MEDICINE

## 2023-03-03 PROCEDURE — 85027 COMPLETE CBC AUTOMATED: CPT | Performed by: INTERNAL MEDICINE

## 2023-03-03 PROCEDURE — 250N000013 HC RX MED GY IP 250 OP 250 PS 637: Performed by: PHYSICIAN ASSISTANT

## 2023-03-03 PROCEDURE — 250N000013 HC RX MED GY IP 250 OP 250 PS 637: Performed by: INTERNAL MEDICINE

## 2023-03-03 RX ORDER — FAMOTIDINE 20 MG/1
20 TABLET, FILM COATED ORAL 2 TIMES DAILY
Qty: 60 TABLET | Refills: 0 | Status: SHIPPED | OUTPATIENT
Start: 2023-03-03 | End: 2023-09-18

## 2023-03-03 RX ORDER — GUAIFENESIN 200 MG/10ML
10 LIQUID ORAL EVERY 4 HOURS PRN
Qty: 355 ML | Refills: 0 | Status: SHIPPED | OUTPATIENT
Start: 2023-03-03 | End: 2023-09-18

## 2023-03-03 RX ADMIN — ACETAMINOPHEN 650 MG: 325 TABLET, FILM COATED ORAL at 09:38

## 2023-03-03 RX ADMIN — CLOPIDOGREL BISULFATE 75 MG: 75 TABLET ORAL at 09:38

## 2023-03-03 RX ADMIN — FAMOTIDINE 20 MG: 20 TABLET, FILM COATED ORAL at 09:38

## 2023-03-03 RX ADMIN — ATENOLOL 25 MG: 25 TABLET ORAL at 09:38

## 2023-03-03 RX ADMIN — LISINOPRIL 5 MG: 5 TABLET ORAL at 09:39

## 2023-03-03 ASSESSMENT — ACTIVITIES OF DAILY LIVING (ADL)
ADLS_ACUITY_SCORE: 31
ADLS_ACUITY_SCORE: 33
ADLS_ACUITY_SCORE: 31
ADLS_ACUITY_SCORE: 31

## 2023-03-03 NOTE — DISCHARGE SUMMARY
[facility]  Discharge Summary  Name: Issac Zelaya    MRN: [id number]  YOB: 1935    Age: 88 year old  Date of Discharge:  3/3/2023  1:30 PM  Date of Admission: 2/23/2023  Primary Care Provider: Bibi Ref-Primary, Physician  Discharge Physician:  Malcolm Pro DO  Discharging Service:  Hospitalist      Discharge Diagnosis:  Issac Zelaya is a 88 year old male with a history of chronic atrial fibrillation on Xarelto for anticoagulation, hypertension, carotid artery stenosis, hyperlipidemia, obstructive sleep apnea, hypertension, chronic kidney disease and obesity who presents with significant fatigue, body aches and generalized weakness for the last 1-2 days.  He tested + for Covid 19.     Work up in the emergency room he was afebrile vital signs are stable.  EKG shows rate controlled atrial fibrillation.  Laboratory result was unremarkable with stable AUNG mild BNP elevation of 4774.  He had normal VBG with normal CBC.  Urinalysis was clear for infection but he did test positive for COVID, which is likely the cause of his weakness. CXR shows no PNA or edema.   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  He was recommended for admission for PT eval for safe disposition.      Since admit course was initially complicated by gross hematuria.  DOAC has been held and hematuria has improved/resolved.  hgb remained stable without significant blood loss     He has a significant cough productive of yellow sputum.  He has also developed loose BM since admit.     On the morning of 2/25, the patient developed acute hypotension after receiving his AM BP medications.  SBP dropped into the 70s.  An RRT was called.  After 2 liters of IVF his SBP improved to the low 100s.  He was given another 1 liter of IVF.   Lab work-up notable for slight lactic acid elevation that has since normalized.   hgb stable.   Troponin unremarkable.  ECG was done showing afib.  repeat CXR today showed possible developing infiltrate near R heart border.  Despite 3 liters of IVF his BP was again dropping and plan is for transfer to ICU given persistent hypotension despite IVF resuscitation.       Initial working diagnosis was volume depletion and subsequent hypotension after receiving his morning anti-HTN meds.  However despite fluid hydration pressures remain soft and transfer to ICU warranted for monitoring and possible vasopressor need    -Patient completed duration of treatment with doxycycline 100 mg twice daily prior to discharge. , started Blood cultures remain negative.  Done with dexamethasone and doxycycline dose.  -C. difficile test, norovirus, negative.  Enteric panel was also negative.  -Echocardiogram reported EF of 55%, ascending aorta is mildly dilated at 4.1, grade 1 or early diastolic dysfunction.  -Atenolol dose decreased to 25 mg daily from 50 mg with parameters due to episodes of bradycardia.  -Patient remained on room air, no hypoxia with activity.  -Restarted Xarelto and Plavix and there was no sign of bleeding from any site .  -Patient working with physical therapy and Occupational Therapy, discharged to transitional care unit -Evaluated by SLP, tolerated regular diet.  -Advised discharged with incentive spirometry.    #hypotension secondary to hypovolemia.  -Resolved, blood pressure is stable, patient is back on atenolol at lower dose.  -Initially suspected due to volume depletion versus sepsis.  -PE was also considered but patient was on chronic DOAC use  -Echo showed EF of 55%  -Sepsis work-up did not show any infection other than a sputum positive staph.  -UA, viral panel negative, stool for C. difficile negative.        #COVID infection.  # Possible staph Pneumonia vs colonization  - CXR did not show typical COVID changes  -He came off supplemental oxygen remained on room air.  -Treated with Dexamethasone  started on 2/25, for 10 days total  -Patient was not given remdesivir  -Covered for possible superimposed bacterial pneumonia  -He is fully vaccinated for COVID-19.  -He completed duration of therapy with doxycycline.  -Patient was on IV Zosyn earlier during this admission.  -MRSA PCR negative.    #Rate controlled A fib.   -Xarelto was initially held for hematuria and then restarted.  -Atenolol ordered with parameters for heart rate and blood pressure.  -Echo as reported.     CKD stage II.   -Creatinine stable 0.94, baseline.     #CVA/CAD   - PTA patient was on Plavix and Xarelto, which was held, now restarted Xarelto.  -Continue to hold Plavix for now, if there is no sign of bleeding and hemoglobin stable will restart on 2/28.     #Gross hematuria: Resolved  -Patient was evaluated by urologist, initially Plavix and Xarelto were held.  Hematuria subsided by itself.  -Restarted both medications, hemoglobin remained stable, no hematuria or bleeding from any other site .     Incidental findings  -Small fat-containing inguinal hernias.  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  -Mild pulmonary fibrosis.    I discussed with patient the plan of discharge and follow-up as an outpatient.  All his question and concerns addressed.       Discharge Disposition:  Discharged to home     Allergies:  Allergies   Allergen Reactions     Epinephrine Shortness Of Breath     Very rapid heartrate     Dabigatran Itching        Discharge Medications:   Current Discharge Medication List      START taking these medications    Details   famotidine (PEPCID) 20 MG tablet Take 1 tablet (20 mg) by mouth 2 times daily  Qty: 60 tablet, Refills: 0    Associated Diagnoses: Infection due to 2019 novel coronavirus      guaiFENesin (ROBITUSSIN) 20 mg/mL liquid Take 10 mLs by mouth every 4 hours as needed for cough  Qty: 355 mL, Refills: 0    Associated Diagnoses:  Infection due to 2019 novel coronavirus         CONTINUE these medications which have NOT CHANGED    Details   atenolol (TENORMIN) 50 MG tablet 1 tab in morning.  Qty: 90 tablet, Refills: 3    Associated Diagnoses: Chronic atrial fibrillation (H); Essential hypertension with goal blood pressure less than 140/90; DOMINGO (obstructive sleep apnea); Essential hypertension, benign      atorvastatin (LIPITOR) 20 MG tablet Take 1 tablet (20 mg) by mouth daily  Qty: 90 tablet, Refills: 3    Associated Diagnoses: Hyperlipidemia LDL goal <100; Stenosis of left carotid artery      B-12 OR 1000 mg daily      clopidogrel (PLAVIX) 75 MG tablet Take 1 tablet (75 mg) by mouth daily  Qty: 90 tablet, Refills: 3    Associated Diagnoses: Chronic atrial fibrillation (H); DOMINGO (obstructive sleep apnea); Essential hypertension, benign; Hypertension goal BP (blood pressure) < 140/90      fluticasone (FLONASE) 50 MCG/ACT nasal spray Spray 1 spray into both nostrils daily  Qty: 16 g, Refills: 0    Associated Diagnoses: Chronic rhinitis      furosemide (LASIX) 20 MG tablet Take 1 tablet (20 mg) by mouth daily  Qty: 90 tablet, Refills: 3    Associated Diagnoses: DOMINGO (obstructive sleep apnea)      lisinopril (ZESTRIL) 5 MG tablet Take 1 tablet (5 mg) by mouth daily  Qty: 90 tablet, Refills: 3    Associated Diagnoses: Chronic atrial fibrillation (H); DOMINGO (obstructive sleep apnea); Essential hypertension, benign; Hypertension goal BP (blood pressure) < 140/90      rivaroxaban ANTICOAGULANT (XARELTO) 15 MG TABS tablet Take 1 tablet (15 mg) by mouth daily (with dinner)  Qty: 90 tablet, Refills: 3    Associated Diagnoses: Chronic atrial fibrillation (H); DOMINGO (obstructive sleep apnea); Essential hypertension, benign; Hypertension goal BP (blood pressure) < 140/90      triamcinolone (KENALOG) 0.1 % external cream Apply topically 2 times daily  Qty: 453.6 g, Refills: 0    Associated Diagnoses: Eczema, unspecified type              Condition on  Discharge:  Discharge condition: Fair   Discharge vitals: Blood pressure (!) 154/91, pulse 85, temperature 97.6  F (36.4  C), temperature source Oral, resp. rate 20, weight 88 kg (194 lb), SpO2 100 %.   Code status on discharge: Full Code     History of Illness:  See detailed admission note for full details.    Significant Physical Exam Findings:  Constitutional: Awake, alert, cooperative, no apparent distress   Respiratory: Clear to auscultation bilaterally, intermittent cough, scatted crackles or wheezing   Cardiovascular: Irreg rhythm, normal S1 and S2, and no murmur noted   Abdomen: Normal bowel sounds, soft, non-distended, non-tender   Skin: No rashes, no cyanosis, dry to touch   Neuro: Alert and oriented x3, no weakness, numbness, memory loss   Extremities: No edema, normal range of motion.   Other(s):           All other systems: Negative          Procedures other than Imaging:  none     Imaging:  Results for orders placed or performed during the hospital encounter of 02/23/23   XR Chest 2 Views    Narrative    XR CHEST 2 VIEWS   2/23/2023 2:50 PM     HISTORY: Shortness of breath    COMPARISON: Chest radiograph 8/10/2018      Impression    IMPRESSION: No acute cardiopulmonary disease.    SANCHEZ BACA MD         SYSTEM ID:  [id number]   CT Abdomen Pelvis w Contrast    Narrative    EXAM: CT ABDOMEN PELVIS W CONTRAST  LOCATION: [facility]  DATE/TIME: 2/23/2023 4:37 PM    INDICATION: bilateral groin pain  COMPARISON: None.  TECHNIQUE: CT scan of the abdomen and pelvis was performed following injection of IV contrast. Multiplanar reformats were obtained. Dose reduction techniques were used.  CONTRAST: 100mL Isovue 370    FINDINGS:   LOWER CHEST: Mild bibasilar interstitial thickening with traction bronchiectasis is consistent with fibrosis. Biatrial enlargement. Coronary artery calcification. Small hiatal hernia.    HEPATOBILIARY: A few subcentimeter hepatic hypodensities which are too  small to characterize.    PANCREAS: Normal.    SPLEEN: A 2.4 cm peripherally calcified hypodense lesion in the spleen is consistent with a benign process, most likely prior infection.    ADRENAL GLANDS: Normal.    KIDNEYS/BLADDER: Bilateral cortical thinning. No hydronephrosis.    BOWEL: Diverticulosis of the colon. No acute inflammatory change. No obstruction.     LYMPH NODES: Normal.    VASCULATURE: Moderate atherosclerosis. Mild stenosis of the origin of the superior mesenteric artery. 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 A saccular infrarenal abdominal aortic artery aneurysm measuring 3.2 x 2.9 cm, image 108:3.    PELVIC ORGANS: Prostatomegaly measuring 5.4 cm in transverse dimension.    MUSCULOSKELETAL: Moderate degenerative changes. Small bilateral fat-containing inguinal hernias, right greater than left.      Impression    IMPRESSION:   1.  Small fat-containing inguinal hernias.  2.  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 See guidelines below.  3.  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  4.  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  5.  Mild pulmonary fibrosis.    REFERENCE:  SVS practice guidelines on the care of patients with an abdominal aortic aneurysm. Carlota MATA. J Vasc Surg, 2018. PMID: 64965550.    Aorta size: 3.0 cm to 3.9 cm: Surveillance imaging at 3-year intervals.   XR Chest Port 1 View    Narrative    EXAM: XR CHEST PORT 1 VIEW  LOCATION: [facility]  DATE/TIME: 2/25/2023 10:27 AM    INDICATION: COVID 19 infection. Shortness of breath. Cough.  COMPARISON: 2/23/2023      Impression    IMPRESSION: Shallow inspiration. Cardiac silhouette size is unchanged and within normal limits. Subsegmental atelectasis, right lung base. Crowding of the pulmonary vascularity secondary to the shallow inspiration. Calcified atherosclerotic changes of   the aortic arch.   XR Chest Port 1 View    Narrative    EXAM: XR CHEST PORT 1 VIEW  LOCATION: [facility]  HOSPITAL  DATE/TIME: 2023 6:16 PM    INDICATION: PICC placement  COMPARISON: 2023 at 1019 hours      Impression    IMPRESSION: Cardiomegaly, unchanged. Pulmonary vascularity normal. Minimal atelectasis in the bases. Upper lung fields are clear. Right PICC line tip distal SVC. Atherosclerotic disease thoracic aorta.   Echocardiogram Complete     Value    LVEF  55%    Narrative    [id number]  [id number]  [id number]  [id number]^TREMAINE^PRITI^SANDRA     [facility]  Echocardiography Laboratory  [address] [place] [address]     Name: MARNI GONZALEZ  MRN: [id number]  : 1935  Study Date: 2023 11:34 AM  Age: 88 yrs  Gender: Male  Patient Location: [facility]  Reason For Study: CHF  Ordering Physician: PRITI PENALOZA  Referring Physician: Bibi Ref-Primary, Physician  Performed By: Sarita Acosta RDCS     BSA: 2.0 m2  Height: 71 in  Weight: 187 lb  HR: 66  BP: 99/52 mmHg  ______________________________________________________________________________  Procedure  Complete Portable Echo Adult. Optison (NDC #3016-1434) given intravenously.  ______________________________________________________________________________  Interpretation Summary     1. The left ventricle is normal in structure, function and size. The visual  ejection fraction is estimated at 55%.  2. The right ventricle is normal in structure, function and size.  3. No valve disease.  4. The ascending aorta is Mildly dilated. 4.1cm.     No changes from echo 2019.  ______________________________________________________________________________  Left Ventricle  The left ventricle is normal in structure, function and size. There is normal  left ventricular wall thickness. The visual ejection fraction is estimated at  55%. Grade I or early diastolic dysfunction. Normal left ventricular wall  motion.     Right Ventricle  The right ventricle is normal in structure, function and size.     Atria  The left atrium is moderate to  severely dilated. Right atrial size is normal.  There is no atrial shunt seen.     Mitral Valve  There is trace mitral regurgitation.     Tricuspid Valve  There is mild (1+) tricuspid regurgitation. The right ventricular systolic  pressure is approximated at 39.1 mmHg plus the right atrial pressure.     Aortic Valve  The aortic valve is normal in structure and function.     Pulmonic Valve  The pulmonic valve is normal in structure and function.     Vessels  The ascending aorta is Mildly dilated. The inferior vena cava was normal in  size with preserved respiratory variability.     Pericardium  There is no pericardial effusion.     Rhythm  The rhythm was atrial fibrillation.  ______________________________________________________________________________  MMode/2D Measurements & Calculations  IVSd: 1.0 cm     LVIDd: 4.6 cm  LVIDs: 2.0 cm  LVPWd: 1.3 cm  FS: 56.2 %  LV mass(C)d: 202.2 grams  LV mass(C)dI: 98.7 grams/m2  Ao root diam: 3.7 cm  LA dimension: 5.1 cm  asc Aorta Diam: 4.2 cm  LA/Ao: 1.4  LVOT diam: 2.5 cm  LVOT area: 5.1 cm2  LA Volume (BP): 114.0 ml  LA Volume Index (BP): 55.6 ml/m2  RWT: 0.58     Doppler Measurements & Calculations  MV E max amol: 98.7 cm/sec  MV dec time: 0.11 sec  TR max amol: 312.3 cm/sec  TR max P.1 mmHg  E/E' av.1  Lateral E/e': 8.4  Medial E/e': 7.8     ______________________________________________________________________________  Report approved by: Jimmy Lopez 2023 01:03 PM                Consultations:  Consultation during this admission received from urology.     Recent Lab Results:  No results for input(s): WBC, HGB, HCT, MCV, PLT in the last 168 hours.  No results for input(s): NA, POTASSIUM, CHLORIDE, CO2, ANIONGAP, GLC, BUN, CR, GFRESTIMATED, GFRESTBLACK, FILIPE, MAG, PHOS, PROTTOTAL, ALBUMIN, BILITOTAL, ALKPHOS, AST, ALT in the last 168 hours.  No results for input(s): GLC, BGM in the last 168 hours.       Pending Results:    Unresulted Labs Ordered in  the Past 30 Days of this Admission     No orders found from 1/24/2023 to 2/24/2023.              Occupational Therapy Referral      Reason for your hospital stay    Pneumonia. COVID19 infection, hypotension.     Follow-up and recommended labs and tests     Follow up with primary care provider, Physician No Ref-Primary, within 7 days for hospital follow- up.  CBC, BMP 1 week, result to PCP.     Activity    Your activity upon discharge: activity as tolerated     Diet    Follow this diet upon discharge: Orders Placed This Encounter      Regular Diet Adult         Total time spent in face to face contact with the patient and coordinating discharge was:  >30 Minutes.

## 2023-03-03 NOTE — PLAN OF CARE
Covid + -Special Precautions  VSS  Alert and Oriented x3 - slight confusion at times.   Denies pain/SoB  Lung sounds diminished  O2 on RA  Continuous Pulse/O2 monitoring  Assist 1 w/GB/W  Tele: Afib CVR  PT, OT & WOC following      BP (!) 141/87 (BP Location: Left arm)   Pulse 63   Temp 97.2  F (36.2  C) (Oral)   Resp 18   Wt 89 kg (196 lb 3.2 oz)   SpO2 98%   BMI 27.36 kg/m

## 2023-03-03 NOTE — PLAN OF CARE
DC instructions given to pt and family, verbalized understanding.  All belongings with pt, IV DC'd and documented.     Pt left the floor via wheelchair with family.

## 2023-03-03 NOTE — PLAN OF CARE
Cared for [id number]     Pt A/O x 4, VSS, pt denies pain, headache, dizziness, N/V & SOB. Pt Asst: 1 w/gait belt & walker. Lung sounds diminished, RA. Tele: Afib CVR (pauses overnight). PT, OT & WOC following. Will continue with plan of care.

## 2023-03-03 NOTE — PROGRESS NOTES
Vitals: BP (!) 150/80 (BP Location: Left arm)   Pulse 62   Temp 98.5  F (36.9  C) (Oral)   Resp 18   Wt 89 kg (196 lb 3.2 oz)   SpO2 96%   BMI 27.36 kg/m      Neuro: A&Ox 4, forgetful  Resp: Diminished, RA. Denies SOB, cough, dizziness  Cardiac: tele Afib BVR/CVR 50s-70s  GI: WDL  : WDL  Skin: Skin tear R elbow Mepilex CDI  Lines/ Drains: PICC SL  Activity: A1 walker/belt  Nutrition: Regular   Pain: Denies    Plans: TBD, expected to discharge to home

## 2023-03-04 NOTE — PLAN OF CARE
Occupational Therapy Discharge Summary    Reason for therapy discharge:    Discharged to home with home therapy.    Progress towards therapy goal(s). See goals on Care Plan in [facility] electronic health record for goal details.  Goals partially met.  Barriers to achieving goals:   discharge from facility.    Therapy recommendation(s):    Continued therapy is recommended.  Rationale/Recommendations:  Per chart review, pt would benefit from continued HHOT to progress to prior level of function.

## 2023-03-04 NOTE — PROGRESS NOTES
Physical Therapy Discharge Summary    Reason for therapy discharge:    Discharged to home with home therapy.    Progress towards therapy goal(s). See goals on Care Plan in [facility] electronic health record for goal details.  Goals partially met.  Barriers to achieving goals:   discharge from facility.    Therapy recommendation(s):    Continued therapy is recommended.  Rationale/Recommendations:  progression of strength and IND iwht mobility.

## 2023-03-05 ENCOUNTER — PATIENT OUTREACH (OUTPATIENT)
Dept: CARE COORDINATION | Facility: CLINIC | Age: 88
End: 2023-03-05
Payer: COMMERCIAL

## 2023-03-05 NOTE — PROGRESS NOTES
[facility] Contact  [facility] [[email or web address]]/[email or web address]     Clinical Data: Transitional Care Management Outreach     Outreach attempted x 2. Call blocked by screening service and disconnected.    Plan:  [facility] will do no further outreaches at this time.       Clau Pinto RN  [facility] Resource [place], [facility]    *Connected Care Resource Team does NOT follow patient ongoing. Referrals are identified based on internal discharge reports and the outreach is to ensure patient has an understanding of their discharge instructions.

## 2023-03-17 ENCOUNTER — VIRTUAL VISIT (OUTPATIENT)
Dept: FAMILY MEDICINE | Facility: CLINIC | Age: 88
End: 2023-03-17
Payer: COMMERCIAL

## 2023-03-17 DIAGNOSIS — R60.0 BILATERAL LOWER EXTREMITY EDEMA: ICD-10-CM

## 2023-03-17 DIAGNOSIS — I48.20 CHRONIC ATRIAL FIBRILLATION (H): ICD-10-CM

## 2023-03-17 DIAGNOSIS — R06.00 DYSPNEA DUE TO COVID-19: Primary | ICD-10-CM

## 2023-03-17 DIAGNOSIS — U07.1 DYSPNEA DUE TO COVID-19: Primary | ICD-10-CM

## 2023-03-17 DIAGNOSIS — R53.81 PHYSICAL DECONDITIONING: ICD-10-CM

## 2023-03-17 DIAGNOSIS — A04.9 BACTERIAL INTESTINAL INFECTION, UNSPECIFIED: ICD-10-CM

## 2023-03-17 PROCEDURE — 99495 TRANSJ CARE MGMT MOD F2F 14D: CPT | Mod: CS | Performed by: FAMILY MEDICINE

## 2023-03-17 ASSESSMENT — ANXIETY QUESTIONNAIRES
GAD7 TOTAL SCORE: 0
3. WORRYING TOO MUCH ABOUT DIFFERENT THINGS: NOT AT ALL
GAD7 TOTAL SCORE: 0
6. BECOMING EASILY ANNOYED OR IRRITABLE: NOT AT ALL
5. BEING SO RESTLESS THAT IT IS HARD TO SIT STILL: NOT AT ALL
2. NOT BEING ABLE TO STOP OR CONTROL WORRYING: NOT AT ALL
1. FEELING NERVOUS, ANXIOUS, OR ON EDGE: NOT AT ALL
7. FEELING AFRAID AS IF SOMETHING AWFUL MIGHT HAPPEN: NOT AT ALL
IF YOU CHECKED OFF ANY PROBLEMS ON THIS QUESTIONNAIRE, HOW DIFFICULT HAVE THESE PROBLEMS MADE IT FOR YOU TO DO YOUR WORK, TAKE CARE OF THINGS AT HOME, OR GET ALONG WITH OTHER PEOPLE: NOT DIFFICULT AT ALL

## 2023-03-17 ASSESSMENT — PATIENT HEALTH QUESTIONNAIRE - PHQ9
5. POOR APPETITE OR OVEREATING: NOT AT ALL
SUM OF ALL RESPONSES TO PHQ QUESTIONS 1-9: 0

## 2023-03-17 NOTE — PROGRESS NOTES
Karthik is a 88 year old who is being evaluated via a billable video visit.      How would you like to obtain your AVS? MyChart  If the video visit is dropped, the invitation should be resent by: Text to cell phone:   Will anyone else be joining your video visit? No          Assessment & Plan     Dyspnea due to COVID-19  - will benefit from pulmonary rehab. Order placed today.   - Pulmonary Rehab Referral; Future    Bacterial intestinal infection, unspecified  - has had diarrhea since he was inpatient. Stool studies about a month ago was unremarkable. However he has since completed a course of Abx so will reassess with stool studies. Start on probiotic daily   - Enteric Bacteria and Virus Panel by JOHN Stool; Future  - Enteric Bacteria and Virus Panel by JOHN Stool    Physical deconditioning  - related to recent hospitalization     Bilateral lower extremity edema  - multifactorial cause- patient to increase lasix back to full tablet daily from 1/2 tablet     Chronic atrial fibrillation (H)  - stable. continue with current regimen as per cardiology              MED REC REQUIRED  Post Medication Reconciliation Status:       See Patient Instructions    No follow-ups on file.    Ale Condon MD  [facility]   Karthik is a 88 year old, presenting for the following health issues:  Hospital F/U      [facility]       Hospital Follow-up Visit:    Hospital/Nursing Home/IP Rehab Facility: [facility]  Date of Admission: 2/23/23  Date of Discharge: 3/3/23  Reason(s) for Admission:    Generalized muscle weakness  Chronic a-fib   Infection due to 2019 novel coronavirus       Was your hospitalization related to COVID-19? YES   How are you feeling today? Worse   Weak and no energy  He peripheral neuropathy and his feet are very swollen and hard to get around. They weren't swollen prior to covid    In the past 24 hours have you had shortness of breath when speaking,  walking, or climbing stairs? No stairs but has SOB walking from each end of the house- using   Do you have a cough? None  When is the last time you had a fever greater than 100? None  Are you having any other symptoms? Diarrhea and Loss of appetite   Do you have any other stressors you would like to discuss with your provider? No             PHQ Assesment Total Score(s) 3/17/2023   PHQ-2 Score 0   PHQ-9 Score 0   Some recent data might be hidden       ALIA-7 Results 3/17/2023   ALIA-7 Total Score 0   Some recent data might be hidden       Was the patient in the ICU or did the patient experience delirium during hospitalization?  No          Problems taking medications regularly:  None  Medication changes since discharge: Start robitussin and pepcid- he hasn't needed them for a couple days    Problems adhering to non-medication therapy:  None    Summary of hospitalization:  [facility] discharge summary reviewed  Diagnostic Tests/Treatments reviewed.  Follow up needed: none  Other Healthcare Providers Involved in Patient s Care:         None  Update since discharge: stable.     Plan of care communicated with patient and family    Son notes patient gets short of breath when walks even a few feet. This is new since COVID infection.   He has also been noted to have bilateral ankle and feet swelling. He takes lasix 20 mg daily but has only been taking  10 mg at this time.   Patient also complains of fatigue and diarrhea. States things just run right through him.   Currently drinks 2 cups of coffee, 8 oz of water and 1 bottle ensure daily.       Review of Systems   Constitutional, HEENT, cardiovascular, pulmonary, GI, , musculoskeletal, neuro, skin, endocrine and psych systems are negative, except as otherwise noted.      Objective           Vitals:  No vitals were obtained today due to virtual visit.    Physical Exam   GENERAL: Healthy, alert and no distress  EYES: Eyes grossly normal to inspection.  No  discharge or erythema, or obvious scleral/conjunctival abnormalities.  RESP: No audible wheeze, cough, or visible cyanosis.  No visible retractions or increased work of breathing.    PSYCH: Mentation appears normal, affect normal/bright, judgement and insight intact, normal speech and appearance well-groomed.          Video-Visit Details    Type of service:  Video Visit   Video Start Time: 4:08 pm   Video End Time:4:22 pm     Originating Location (pt. Location): Home    Distant Location (provider location):  On-site  Platform used for Video Visit: Migel

## 2023-07-05 NOTE — PROGRESS NOTES
ICU End of Shift Summary.  For vital signs and complete assessments, please see documentation flowsheets.      Pertinent assessments:   Neuro:Alert and orientated X4, pt able to make needs known.   Cardiac:Controlled Afib, takes Xarelto , restarting today. BPs WNL  Resp: LS coarse, dim, productive cough, large amt yellow/white thick secretions. Pt using oral suction to spit secretions.   GI:Regular diet, good appetite  :External cath  Skin: Reji UE brusing/scabs. Skin tear to left AC.   Lines: PICC, dressing changed  Drips:None    Major Shift Events:       WOC consulted for coccyx    Speech eval consulted, pt stated he was having increased difficulty swallowing. See Notes from speech.    TOF      Plan (Upcoming Events): Transfer to TOF unit. Work with PT tomorrow?  Discharge/Transfer Needs: TBD     Bedside Shift Report Completed : yes  Bedside Safety Check Completed: yes      Symptoms

## 2023-07-11 ENCOUNTER — TELEPHONE (OUTPATIENT)
Dept: PEDIATRICS | Facility: CLINIC | Age: 88
End: 2023-07-11
Payer: COMMERCIAL

## 2023-07-11 DIAGNOSIS — L30.9 ECZEMA, UNSPECIFIED TYPE: ICD-10-CM

## 2023-07-11 NOTE — TELEPHONE ENCOUNTER
"MA/TC    Pt called to request hospital f/u appt. Please call pt and help him schedule this. Pt also states that when calling, \"please identify yourself\".     Thanks,  Cyrus Almanza RN on 7/11/2023 at 10:28 AM    This encounter was handled by a team outside your facility.  If action needs to be taken, please route the encounter back to your team at your own clinic, not the sender.  Thank you    "

## 2023-07-12 ENCOUNTER — TELEPHONE (OUTPATIENT)
Dept: FAMILY MEDICINE | Facility: CLINIC | Age: 88
End: 2023-07-12
Payer: COMMERCIAL

## 2023-07-12 NOTE — TELEPHONE ENCOUNTER
Reason for Call:  Appointment Request    Patient requesting this type of appt:  Hospital/ED Follow-Up , [facility] on 7.9.23, chest pain     Requested provider: No Ref-Primary, Physician    Reason patient unable to be scheduled: Not within requested timeframe    When does patient want to be seen/preferred time: this week    Comments: see above    Could we send this information to you in [facility] or would you prefer to receive a phone call?:   Patient would prefer a phone call   Okay to leave a detailed message?: Yes at Home number on file [phone] (home)    Call taken on 7/12/2023 at 10:35 AM by Julianne Gee

## 2023-07-12 NOTE — TELEPHONE ENCOUNTER
Called patient, scheduled hospital f/u appointment with Dr. Goldstein on Tuesday 07/18/23 at 10:10AM. Ruth Behrens.

## 2023-07-13 RX ORDER — TRIAMCINOLONE ACETONIDE 1 MG/G
CREAM TOPICAL
Qty: 454 G | Refills: 3 | OUTPATIENT
Start: 2023-07-13

## 2023-07-18 ENCOUNTER — OFFICE VISIT (OUTPATIENT)
Dept: FAMILY MEDICINE | Facility: CLINIC | Age: 88
End: 2023-07-18
Payer: COMMERCIAL

## 2023-07-18 VITALS
HEART RATE: 110 BPM | SYSTOLIC BLOOD PRESSURE: 115 MMHG | DIASTOLIC BLOOD PRESSURE: 77 MMHG | WEIGHT: 196 LBS | BODY MASS INDEX: 27.44 KG/M2 | RESPIRATION RATE: 14 BRPM | OXYGEN SATURATION: 98 % | HEIGHT: 71 IN | TEMPERATURE: 97.5 F

## 2023-07-18 DIAGNOSIS — R07.89 ATYPICAL CHEST PAIN: Primary | ICD-10-CM

## 2023-07-18 DIAGNOSIS — N18.2 CKD (CHRONIC KIDNEY DISEASE) STAGE 2, GFR 60-89 ML/MIN: ICD-10-CM

## 2023-07-18 DIAGNOSIS — I77.810 THORACIC AORTIC ECTASIA (H): ICD-10-CM

## 2023-07-18 DIAGNOSIS — G62.9 NEUROPATHY: ICD-10-CM

## 2023-07-18 PROBLEM — S06.5X9A TRAUMATIC SUBDURAL HEMORRHAGE WITH LOSS OF CONSCIOUSNESS OF UNSPECIFIED DURATION, INITIAL ENCOUNTER (H): Status: RESOLVED | Noted: 2022-11-08 | Resolved: 2023-07-18

## 2023-07-18 PROBLEM — S06.5XAA SUBDURAL HEMATOMA (H): Status: RESOLVED | Noted: 2017-01-24 | Resolved: 2023-07-18

## 2023-07-18 PROBLEM — I10 ESSENTIAL HYPERTENSION, BENIGN: Status: RESOLVED | Noted: 2017-06-27 | Resolved: 2023-07-18

## 2023-07-18 PROCEDURE — 99213 OFFICE O/P EST LOW 20 MIN: CPT | Performed by: FAMILY MEDICINE

## 2023-07-18 RX ORDER — TRIAMCINOLONE ACETONIDE 1 MG/G
CREAM TOPICAL 2 TIMES DAILY
Qty: 453.6 G | Refills: 0 | Status: CANCELLED | OUTPATIENT
Start: 2023-07-18

## 2023-07-18 NOTE — PROGRESS NOTES
"  Assessment & Plan   Problem List Items Addressed This Visit     Atypical chest pain - Primary     .  Prandial nonexertional chest pain evaluated in ED, evaluation reassuring.  Regardless, known vasculopath.  He needs a stress test.  He will not be able to ambulate         Relevant Orders    NM Lexiscan stress test    Neuropathy     Hypoesthesia in hands and feet.  He uses a cane, broad-based gait.                MED REC REQUIRED  Post Medication Reconciliation Status: patient was not discharged from an inpatient facility or TCU  BMI:   Estimated body mass index is 27.34 kg/m  as calculated from the following:    Height as of this encounter: 1.803 m (5' 11\").    Weight as of this encounter: 88.9 kg (196 lb).           Curt Goldstein MD  [facility]    Lev Angeles is a 88 year old, presenting for the following health issues:  ER F/U      HPI     ED/UC Followup:    Facility:  [facility]  Date of visit: 7/9/2023  Reason for visit: chest pain  Current Status: no current symptoms          Review of Systems   No further chest pain no heartburn no dysphagia no systemic symptoms      Objective    /77 (BP Location: Right arm, Patient Position: Chair, Cuff Size: Adult Regular)   Pulse 110   Temp 97.5  F (36.4  C) (Oral)   Resp 14   Ht 1.803 m (5' 11\")   Wt 88.9 kg (196 lb)   SpO2 98%   BMI 27.34 kg/m    Body mass index is 27.34 kg/m .  Physical Exam   {Broad-based gait  Deconditioned, uses arms to rise from chair                    "

## 2023-07-19 NOTE — ASSESSMENT & PLAN NOTE
.  Prandial nonexertional chest pain evaluated in ED, evaluation reassuring.  Regardless, known vasculopath.  He needs a stress test.  He will not be able to ambulate

## 2023-09-11 ENCOUNTER — MEDICAL CORRESPONDENCE (OUTPATIENT)
Dept: HEALTH INFORMATION MANAGEMENT | Facility: CLINIC | Age: 88
End: 2023-09-11

## 2023-09-18 ENCOUNTER — NURSE TRIAGE (OUTPATIENT)
Dept: NURSING | Facility: CLINIC | Age: 88
End: 2023-09-18

## 2023-09-18 ENCOUNTER — OFFICE VISIT (OUTPATIENT)
Dept: FAMILY MEDICINE | Facility: CLINIC | Age: 88
End: 2023-09-18
Payer: COMMERCIAL

## 2023-09-18 VITALS
TEMPERATURE: 97.6 F | DIASTOLIC BLOOD PRESSURE: 75 MMHG | SYSTOLIC BLOOD PRESSURE: 123 MMHG | HEIGHT: 71 IN | WEIGHT: 200 LBS | OXYGEN SATURATION: 96 % | HEART RATE: 72 BPM | RESPIRATION RATE: 20 BRPM | BODY MASS INDEX: 28 KG/M2

## 2023-09-18 DIAGNOSIS — E46 PROTEIN-CALORIE MALNUTRITION, UNSPECIFIED SEVERITY (H): ICD-10-CM

## 2023-09-18 DIAGNOSIS — J00 CORYZA: ICD-10-CM

## 2023-09-18 DIAGNOSIS — I71.43 INFRARENAL ABDOMINAL AORTIC ANEURYSM (AAA) WITHOUT RUPTURE (H): ICD-10-CM

## 2023-09-18 DIAGNOSIS — E87.1 HYPONATREMIA: ICD-10-CM

## 2023-09-18 DIAGNOSIS — R62.7 FAILURE TO THRIVE IN ADULT: Primary | ICD-10-CM

## 2023-09-18 DIAGNOSIS — R13.10 DYSPHAGIA, UNSPECIFIED TYPE: ICD-10-CM

## 2023-09-18 DIAGNOSIS — Z87.898 HISTORY OF ODYNOPHAGIA: ICD-10-CM

## 2023-09-18 DIAGNOSIS — R63.4 WEIGHT LOSS: ICD-10-CM

## 2023-09-18 DIAGNOSIS — L20.84 INTRINSIC ECZEMA: ICD-10-CM

## 2023-09-18 DIAGNOSIS — I70.91 GENERALIZED ATHEROSCLEROSIS: ICD-10-CM

## 2023-09-18 DIAGNOSIS — R63.0 ANOREXIA: ICD-10-CM

## 2023-09-18 DIAGNOSIS — R53.81 PHYSICAL DECONDITIONING: ICD-10-CM

## 2023-09-18 DIAGNOSIS — G47.33 OSA (OBSTRUCTIVE SLEEP APNEA): ICD-10-CM

## 2023-09-18 PROBLEM — U07.1 INFECTION DUE TO 2019 NOVEL CORONAVIRUS: Status: RESOLVED | Noted: 2023-02-25 | Resolved: 2023-09-18

## 2023-09-18 LAB
ALBUMIN SERPL BCG-MCNC: 3.6 G/DL (ref 3.5–5.2)
ALP SERPL-CCNC: 60 U/L (ref 40–129)
ALT SERPL W P-5'-P-CCNC: 10 U/L (ref 0–70)
ANION GAP SERPL CALCULATED.3IONS-SCNC: 10 MMOL/L (ref 7–15)
AST SERPL W P-5'-P-CCNC: 22 U/L (ref 0–45)
BASOPHILS # BLD AUTO: 0 10E3/UL (ref 0–0.2)
BASOPHILS NFR BLD AUTO: 1 %
BILIRUB SERPL-MCNC: 0.6 MG/DL
BUN SERPL-MCNC: 14.1 MG/DL (ref 8–23)
CALCIUM SERPL-MCNC: 9 MG/DL (ref 8.8–10.2)
CHLORIDE SERPL-SCNC: 100 MMOL/L (ref 98–107)
CREAT SERPL-MCNC: 1 MG/DL (ref 0.67–1.17)
DEPRECATED HCO3 PLAS-SCNC: 26 MMOL/L (ref 22–29)
EGFRCR SERPLBLD CKD-EPI 2021: 73 ML/MIN/1.73M2
EOSINOPHIL # BLD AUTO: 1.2 10E3/UL (ref 0–0.7)
EOSINOPHIL NFR BLD AUTO: 14 %
ERYTHROCYTE [DISTWIDTH] IN BLOOD BY AUTOMATED COUNT: 12.8 % (ref 10–15)
GLUCOSE SERPL-MCNC: 93 MG/DL (ref 70–99)
HCT VFR BLD AUTO: 32.8 % (ref 40–53)
HGB BLD-MCNC: 11.5 G/DL (ref 13.3–17.7)
HOLD SPECIMEN: NORMAL
IMM GRANULOCYTES # BLD: 0 10E3/UL
IMM GRANULOCYTES NFR BLD: 0 %
LYMPHOCYTES # BLD AUTO: 1.2 10E3/UL (ref 0.8–5.3)
LYMPHOCYTES NFR BLD AUTO: 15 %
MCH RBC QN AUTO: 34.1 PG (ref 26.5–33)
MCHC RBC AUTO-ENTMCNC: 35.1 G/DL (ref 31.5–36.5)
MCV RBC AUTO: 97 FL (ref 78–100)
MONOCYTES # BLD AUTO: 0.7 10E3/UL (ref 0–1.3)
MONOCYTES NFR BLD AUTO: 8 %
NEUTROPHILS # BLD AUTO: 5.1 10E3/UL (ref 1.6–8.3)
NEUTROPHILS NFR BLD AUTO: 62 %
PLATELET # BLD AUTO: 224 10E3/UL (ref 150–450)
POTASSIUM SERPL-SCNC: 5 MMOL/L (ref 3.4–5.3)
PROT SERPL-MCNC: 6.9 G/DL (ref 6.4–8.3)
RBC # BLD AUTO: 3.37 10E6/UL (ref 4.4–5.9)
SODIUM SERPL-SCNC: 136 MMOL/L (ref 136–145)
TSH SERPL DL<=0.005 MIU/L-ACNC: 3.32 UIU/ML (ref 0.3–4.2)
WBC # BLD AUTO: 8.2 10E3/UL (ref 4–11)

## 2023-09-18 PROCEDURE — 99214 OFFICE O/P EST MOD 30 MIN: CPT | Performed by: FAMILY MEDICINE

## 2023-09-18 PROCEDURE — 84134 ASSAY OF PREALBUMIN: CPT | Performed by: FAMILY MEDICINE

## 2023-09-18 PROCEDURE — 36415 COLL VENOUS BLD VENIPUNCTURE: CPT | Performed by: FAMILY MEDICINE

## 2023-09-18 PROCEDURE — 80053 COMPREHEN METABOLIC PANEL: CPT | Performed by: FAMILY MEDICINE

## 2023-09-18 PROCEDURE — 84443 ASSAY THYROID STIM HORMONE: CPT | Performed by: FAMILY MEDICINE

## 2023-09-18 PROCEDURE — 85025 COMPLETE CBC W/AUTO DIFF WBC: CPT | Performed by: FAMILY MEDICINE

## 2023-09-18 RX ORDER — IPRATROPIUM BROMIDE 42 UG/1
2 SPRAY, METERED NASAL 4 TIMES DAILY
Start: 2023-09-18

## 2023-09-18 RX ORDER — TRIAMCINOLONE ACETONIDE 1 MG/G
OINTMENT TOPICAL 2 TIMES DAILY
Start: 2023-09-18

## 2023-09-18 RX ORDER — MIRTAZAPINE 7.5 MG/1
7.5 TABLET, FILM COATED ORAL AT BEDTIME
Qty: 30 TABLET | Refills: 3 | Status: SHIPPED | OUTPATIENT
Start: 2023-09-18 | End: 2023-11-22

## 2023-09-18 RX ORDER — FAMOTIDINE 20 MG/1
20 TABLET, FILM COATED ORAL
COMMUNITY
Start: 2023-09-07 | End: 2023-09-18 | Stop reason: ALTCHOICE

## 2023-09-18 RX ORDER — PANTOPRAZOLE SODIUM 40 MG/1
40 TABLET, DELAYED RELEASE ORAL DAILY
Qty: 90 TABLET | Refills: 3 | Status: SHIPPED | OUTPATIENT
Start: 2023-09-18

## 2023-09-18 RX ORDER — FAMOTIDINE 20 MG/1
20 TABLET, FILM COATED ORAL
Status: CANCELLED | OUTPATIENT
Start: 2023-09-18

## 2023-09-18 RX ORDER — MUPIROCIN 20 MG/G
OINTMENT TOPICAL 3 TIMES DAILY
Start: 2023-09-18

## 2023-09-18 NOTE — PROGRESS NOTES
Assessment & Plan   Problem List Items Addressed This Visit       Anorexia     He has also been diagnosed  with protein calorie malnutrition.  He is resistant to pharmacologic appetite stimulation, son is insistent.  Prescribe         Relevant Medications    mirtazapine (REMERON) 7.5 MG tablet    Other Relevant Orders    CBC with platelets and differential (Completed)    Comprehensive metabolic panel (BMP + Alb, Alk Phos, ALT, AST, Total. Bili, TP) (Completed)    Prealbumin    TSH with free T4 reflex (Completed)    Coryza     Patient feels this contributes to his difficulty swallowing.  Atrovent nasal.  Application discussed         Relevant Medications    ipratropium (ATROVENT) 0.06 % nasal spray    Dysphagia, unspecified type     Incomplete esophogram OK, EGD declined, stricture postulated         Relevant Medications    pantoprazole (PROTONIX) 40 MG EC tablet    Failure to thrive in adult - Primary     Well described in hospital record.  He is improved, but anorexic.  Discussed failure to thrive, likelihood of recurrent return to hospital, PT starting tomorrow.  Recommend care coordination, discuss additional services, discuss alternate residential situation         Relevant Orders    Primary Care - Care Coordination Referral    CBC with platelets and differential (Completed)    Comprehensive metabolic panel (BMP + Alb, Alk Phos, ALT, AST, Total. Bili, TP) (Completed)    Prealbumin    TSH with free T4 reflex (Completed)    Generalized atherosclerosis     Diffuse.         History of odynophagia     Present at admit, this has resolved         Hyponatremia     In hospital resolved with IV fluids.  Reassess periodically         Infrarenal abdominal aortic aneurysm (AAA) without rupture (H)     CT this last spring.  Surveillance every 3 years recommended         Intrinsic eczema     Impetiginized, being treated with topical steroids, arm coverings.  Bactroban.  Arms with a few eczematous changes, bruises of various  ages.  Arm coverings removed, patient starts scratching         Relevant Medications    triamcinolone (KENALOG) 0.1 % external ointment    mupirocin (BACTROBAN) 2 % external ointment    ipratropium (ATROVENT) 0.06 % nasal spray    DOMINGO (obstructive sleep apnea)     Untreated.  Unlikely to be treated at this age         Physical deconditioning     Patient was weak unable to arise when hospitalized.  He uses a walker at home.  Physical therapy will be starting tomorrow         Protein-calorie malnutrition (H)     Diagnosed in hospital.  Broaden database         Weight loss     Although reported, not evident in our medical record.  Seek other measures of nutrition defect                    MED REC REQUIRED  Post Medication Reconciliation Status: discharge medications reconciled and changed, per note/orders      Curt Goldstein MD  [facility]    Lev Angeles is a 88 year old, presenting for the following health issues:  Hospital F/U        9/18/2023    12:38 PM   Additional Questions   Roomed by Bre LOCKWOOD       Hospital Follow-up Visit:    Hospital/Nursing Home/IP Rehab Facility:  [facility]  Date of Admission: 9/4/2023  Date of Discharge: 9/7/2023  Reason(s) for Admission: Weakness and dehydration    Was your hospitalization related to COVID-19? No   Problems taking medications regularly:  None  Medication changes since discharge: None  Problems adhering to non-medication therapy:  None    Summary of hospitalization:  CareEverywhere information obtained and reviewed  Diagnostic Tests/Treatments reviewed.  Follow up needed: none  Other Healthcare Providers Involved in Patient s Care:         Homecare  Update since discharge: fluctuating course.       Here with spouse Alida, son Issac.  CBL authorization for both on file  Plan of care communicated with patient and family             Dismissed on PPI and H2 blocker, they have been taking both in the morning which reduces the  "effects of both.  Recommend discontinuation of the H2 blocker, famotidine.      Review of Systems   Patient admits to anorexia, denies other problems no heartburn no dysphagia      Objective    /75 (BP Location: Right arm, Patient Position: Chair, Cuff Size: Adult Regular)   Pulse 72   Temp 97.6  F (36.4  C) (Oral)   Resp 20   Ht 1.803 m (5' 11\")   Wt 90.7 kg (200 lb)   SpO2 96%   BMI 27.89 kg/m    Body mass index is 27.89 kg/m .  Physical Exam     Skin as described  Trace pedal edema  Broad-based gait  Curt Goldstein MD                      "

## 2023-09-18 NOTE — TELEPHONE ENCOUNTER
Patient's son calling to report patient started a new medication today Remeron but it was given before his bedtime.  Patient is sleeping soundly and caller not sure they should wake him.  He is breathing normally.  They think it could be the new medication.  They were able to wake patient during call, who is doing well, and will make sure to give it to him at bedtime from now on.    Taniya Metzger RN  [facility]      Reason for Disposition    Caller has medicine question, adult has minor symptoms, caller declines triage, AND triager answers question    Protocols used: Medication Question Call-A-

## 2023-09-19 ENCOUNTER — TELEPHONE (OUTPATIENT)
Dept: FAMILY MEDICINE | Facility: CLINIC | Age: 88
End: 2023-09-19
Payer: COMMERCIAL

## 2023-09-19 ENCOUNTER — PATIENT OUTREACH (OUTPATIENT)
Dept: CARE COORDINATION | Facility: CLINIC | Age: 88
End: 2023-09-19
Payer: COMMERCIAL

## 2023-09-19 LAB — PREALB SERPL IA-MCNC: 13 MG/DL (ref 15–45)

## 2023-09-19 NOTE — ASSESSMENT & PLAN NOTE
Although reported, not evident in our medical record.  Seek other measures of nutrition defect  
CT this last spring.  Surveillance every 3 years recommended  
Diagnosed in hospital.  [facility] database  
Diffuse.  
He has also been diagnosed  with protein calorie malnutrition.  He is resistant to pharmacologic appetite stimulation, son is insistent.  Prescribe  
Impetiginized, being treated with topical steroids, arm coverings.  Bactroban.  Arms with a few eczematous changes, bruises of various ages.  Arm coverings removed, patient starts scratching  
In hospital resolved with IV fluids.  Reassess periodically  
Incomplete esophogram OK, EGD declined, stricture postulated  
Patient feels this contributes to his difficulty swallowing.  Atrovent nasal.  Application discussed  
Patient was weak unable to arise when hospitalized.  He uses a walker at home.  Physical therapy will be starting tomorrow  
Present at admit, this has resolved  
Untreated.  Unlikely to be treated at this age  
Well described in hospital record.  He is improved, but anorexic.  Discussed failure to thrive, likelihood of recurrent return to hospital, PT starting tomorrow.  Recommend care coordination, discuss additional services, discuss alternate residential situation  
Cold/Sinus

## 2023-09-19 NOTE — PROGRESS NOTES
"Clinic Care Coordination Contact  Community Health Worker Initial Outreach    CHW Initial Information Gathering:  Referral Source: PCP  Current living arrangement:: I live in a private home with spouse  Type of residence:: Private home - stairs  Community Resources: Home Care  Equipment Currently Used at Home: cane, straight  Informal Support system:: Family  No PCP office visit in Past Year: No  Transportation means:: Regular car  CHW Additional Questions  If ED/Hospital discharge, follow-up appointment scheduled as recommended?: N/A  Medication changes made following ED/Hospital discharge?: N/A  MyChart active?: Yes  Patient sent Social Determinants of Health questionnaire?: No    CHW introduced self and role with CC to patients son, Karthik.   Karthik states that patient and his wife are adamant about living at home alone until they die, won't even enter the conversation about moving.   Karthik is an only child and lives 10 min away. He shares that he will do anything for them but they need more care than what they can provide for each other. He does not want to strain their relationship by providing all of these cares.   Patient has a \"skin condition\" that they were given specific instructions after hospital discharge. When Karthik went to see pt at home, his mom states that he has not had a shower yet and they are not following instructions. He didn't have a shower for two weeks. They do have a shower chair.  Karthik states that his mom is in great health, she is a \"staunch [place] woman who does not like change\".   In regards to current home care ordered by the hospital, OT was there last week and said that nothing further was needed. PT will be twice a week. No nursing or home health aid.   CC team will further investigate to see if these services can be added. Karthik does not know the name of the current home care agency.     Karthik shares that patient and wife both still drive, they order groceries from [employer] and have them " delivered, they order dinners through [employer].   Karthik would be interested in hiring (private pay) a nurse to come once a week or an aid to help with showers.     DARIEL LVM for clinical supervisor at [facility] [employer] #[phone] on 9/20/23, inquiring about adding RN and HHA. Orders would need to come from PCP.    [facility] PT called CHW back, she is going to call patients son Karthik to gain further info. Given update that he he has a rash and is not following discharge instructions with bathing, cream, etc. Plan is to request RN and HHA orders.     Patient accepts CC: Yes. Patient scheduled for assessment with CC on 9/21 at 2:00pm. Patient noted desire to discuss additional home health services.     DARIEL Hernandes, B.S. [facility] Care Coordination  [facility]:  [place], [place] and [place]  [phone]  [email or web address]

## 2023-09-20 ENCOUNTER — TELEPHONE (OUTPATIENT)
Dept: FAMILY MEDICINE | Facility: CLINIC | Age: 88
End: 2023-09-20
Payer: COMMERCIAL

## 2023-09-20 NOTE — TELEPHONE ENCOUNTER
Home Health Care    Reason for call:  Skilled nursing to eval and treat.Education on medications and general health assessments. Home health aid to assist w bathing weekly    Orders are needed for this patient.  Skilled Nursing: bathing aid    Pt Provider: [place]    Phone Number Homecare Nurse can be reached at: [phone] Muriel    Can we leave a detailed message on this number? YES      Could we send this information to you in [facility] or would you prefer to receive a phone call?:   Patient would prefer a phone call   Okay to leave a detailed message?: Yes at Other phone number:  [phone]  Muriel

## 2023-09-21 ENCOUNTER — PATIENT OUTREACH (OUTPATIENT)
Dept: NURSING | Facility: CLINIC | Age: 88
End: 2023-09-21
Payer: COMMERCIAL

## 2023-09-21 ASSESSMENT — ACTIVITIES OF DAILY LIVING (ADL): DEPENDENT_IADLS:: COOKING;LAUNDRY;SHOPPING

## 2023-09-21 NOTE — TELEPHONE ENCOUNTER
Routed to , please confirm home care orders and route back to inform them  Chiara Bravo RN, BSN  [facility]

## 2023-09-21 NOTE — LETTER
[facility]  Patient Centered Plan of Care  About Me:        Patient Name:  Issac Zelaya    YOB: 1935  Age:         88 year old   Ct MRN:    [id number] Telephone Information:  Home Phone [phone]   Mobile [phone]       Address:  [address] [place] [address] Email address:  [email or web address]      Emergency Contact(s)    Name Relationship Lgl Grd Work Phone Home Phone Mobile Phone   1. SAMANTHA ZELAYA Spouse  none [phone] none   2. CARLOS DALE, NICHOLE* Son    [phone]           Primary language:  English     needed? No   [facility]:  [phone] op. 1  Other communication barriers:None    Preferred Method of Communication:  Mail  Current living arrangement: I live in a private home with spouse    Mobility Status/ Medical Equipment: Independent w/Device        Health Maintenance  Health Maintenance Reviewed: Due/Overdue   Health Maintenance Due   Topic Date Due    ZOSTER IMMUNIZATION (1 of 2) Never done    DTAP/TDAP/TD IMMUNIZATION (1 - Tdap) 11/11/2009    COVID-19 Vaccine (5 - Pfizer series) 03/08/2023    INFLUENZA VACCINE (1) 09/01/2023          My Access Plan  Medical Emergency 911   Primary Clinic Line     24 Hour Appointment Line [phone] or  [phone]) (toll-free)   24 Hour Nurse Line [phone] (toll-free)   Preferred Urgent Care No data recorded   Preferred Hospital No data recorded   Preferred Pharmacy [employer] - A MAIL ORDER PHMACY     Behavioral Health Crisis Line The National Suicide Prevention Lifeline at 1-502.369.2116 or Text/Call 077             My Care Team Members  Patient Care Team         Relationship Specialty Notifications Start End    No Ref-Primary, Physician PCP - General   9/7/22     Fax: [phone]         Ventura Shields MD Assigned Heart and Vascular Provider   10/23/20     Pager: [phone]         Felicita Hidalgo APRN CNP Assigned PCP   11/15/20     Phone:  [phone] Fax: [phone]         [address] [facility] [address]    Ventura Shields MD MD Cardiovascular Disease  9/7/22     Pager: [phone]         Lidia Taylor LSW Lead Care Coordinator Primary Care - CC Admissions 9/19/23     Phone: [phone]         Brittnee Tierney MA Community Health Worker Primary Care - CC Admissions 9/22/23     Phone: [phone]                   My Care Plans  Self Management and Treatment Plan  Care Plan  Care Plan: Help At Home       Problem: Insufficient In-home support       Goal: Establish adequate home support       Expected End Date: 11/30/2023    Note:     Goal Statement: I will continue to take steps over the next 3 month(s) to identify and establish with in home/community supports which will allow me to maintain my current level of independence.  Barriers: Not sure what is available  Strengths: Strong family support  Patient expressed understanding of goal: Yes    Action steps to achieve this goal:  My family and I will explore community resources that can help me in my home.  I will continue to lean on informal supports of my: family  My family and I will review resources and supports sent to me via E-mail  My family and I will outreach to supports and consider establishing with ones I might find beneficial.  My family and I will follow up with scheduled appointments as recommended  I will take medications as prescribed.   My family will contact my care team with questions, concerns, support needs.   My family and I will use the clinic as a resource and I understand I can contact my clinic with 24/7 after hours services available.   I will discuss, review, schedule and complete any recommended overdue health maintenance with my provider/care team.                                   Action Plans on File:                       Advance Care Plans/Directives Type:   Honoring Choices    Advance Care Planning and Health Care Directives  When it  comes to decisions about your health care, it s important that your voice is heard. You may not always be able to speak for yourself.    We encourage you to have discussions with your loved ones, cultural or spiritual leaders and health care providers about your goals, values, beliefs and choices.    We are a part of [employer] [place] , supporting and promoting the benefits of advance care planning conversations.    Our goals are to:  Help you make informed decisions about your healthcare choices and ensure that those choices are honored  Offer advance care planning discussions with trained staff  Ensure your choices are clearly defined, documented and available in your medical record  Translate your choices into medical orders as needed    Why is Advance Care Planning important?  Know what your health care choices are and decide what is right for you  An unexpected illness or injury could leave you unable to participate in important treatment decisions  When choices are left to others to decide that responsibility can be difficult and stressful  By discussing and outlining your choices, your voice is heard in the care you want to receive    How can I learn more?  We offer free classes at multiple locations, days and times. Our trained facilitators will provide information and guide you through a Health Care Directive document. They can also review, notarize and add your document to your medical record.    Call [employer] at [phone] or toll free at [phone] for assistance.     My Medical and Care Information  Problem List   Patient Active Problem List   Diagnosis    CARDIOVASCULAR SCREENING; LDL GOAL LESS THAN 130    Generalized atherosclerosis    Carotid artery stenosis    Atrial fibrillation (H)    Thoracic aortic ectasia (H)    Localized edema    Significant closed head trauma within past 3 months    Hyperlipidemia LDL goal <100    DOMINGO (obstructive sleep apnea)    Essential  hypertension with goal blood pressure less than 140/90    Pain in both feet    Coronary artery disease    CKD (chronic kidney disease) stage 2, GFR 60-89 ml/min    Generalized muscle weakness    Chronic a-fib (H)    Atypical chest pain    Neuropathy    Failure to thrive in adult    Anorexia    Intrinsic eczema    Hyponatremia    Physical deconditioning    Weight loss    Protein-calorie malnutrition (H)    Infrarenal abdominal aortic aneurysm (AAA) without rupture (H)    Coryza    Dysphagia, unspecified type    History of odynophagia      Current Medications and Allergies:  See printed Medication Report.    Care Coordination Start Date: 9/18/2023   Frequency of Care Coordination: monthly     Form Last Updated: 09/22/2023

## 2023-09-21 NOTE — LETTER
[facility] CARE COORDINATION  [facility]   September 22, 2023    Issac Zelaya  [address] [address]      Dear Issac,        I am a  clinic care coordinator who works with Physician Bibi Ref-Primary with the [facility]. I wanted to thank your son Karthik  for spending the time to talk with me.  Below is a description of clinic care coordination and how I can further assist you.       The clinic care coordination team is made up of a registered nurse, , financial resource worker and community health worker who understand the health care system. The goal of clinic care coordination is to help you manage your health and improve access to the health care system. Our team works alongside your provider to assist you in determining your health and social needs. We can help you obtain health care and community resources, providing you with necessary information and education. We can work with you through any barriers and develop a care plan that helps coordinate and strengthen the communication between you and your care team.  Our services are voluntary and are offered without charge to you personally.    Please feel free to contact me with any questions or concerns regarding care coordination and what we can offer.      We are focused on providing you with the highest-quality healthcare experience possible.    Sincerely,     JUAN PABLO Harvey   Care Coordination Team  [phone]      Enclosed: I have enclosed a copy of the Patient Centered Plan of Care. This has helpful information and goals that we have talked about. Please keep this in an easy to access place to use as needed.

## 2023-09-22 NOTE — PROGRESS NOTES
Clinic Care Coordination Contact  Clinic Care Coordination Contact  OUTREACH    Referral Information:  Referral Source: PCP    Primary Diagnosis: Other (include Comment box)    Chief Complaint   Patient presents with    Clinic Care Coordination - Initial     Help at home        Universal Utilization: 87% Admission or ED Risk  Clinic Utilization  Difficulty keeping appointments:: No  Compliance Concerns: No  No-Show Concerns: No  No PCP office visit in Past Year: No  Utilization      Hospital Admissions  1             ED Visits  1             No Show Count (past year)  0                    Current as of: 9/22/2023  7:49 AM                Clinical Concerns:  Current Medical Concerns:    Patient Active Problem List   Diagnosis    CARDIOVASCULAR SCREENING; LDL GOAL LESS THAN 130    Generalized atherosclerosis    Carotid artery stenosis    Atrial fibrillation (H)    Thoracic aortic ectasia (H)    Localized edema    Significant closed head trauma within past 3 months    Hyperlipidemia LDL goal <100    DOMINGO (obstructive sleep apnea)    Essential hypertension with goal blood pressure less than 140/90    Pain in both feet    Coronary artery disease    CKD (chronic kidney disease) stage 2, GFR 60-89 ml/min    Generalized muscle weakness    Chronic a-fib (H)    Atypical chest pain    Neuropathy    Failure to thrive in adult    Anorexia    Intrinsic eczema    Hyponatremia    Physical deconditioning    Weight loss    Protein-calorie malnutrition (H)    Infrarenal abdominal aortic aneurysm (AAA) without rupture (H)    Coryza    Dysphagia, unspecified type    History of odynophagia        LEYLA RUBIO spoke with son Karthik regarding in home support resources. Currently in process requesting Home Care HHA.  Already receiving Home Care SN and Physical Therapy.      Karthik and his spouse live in their town home and not interested in entertaining idea of moving to higher level of care. They have been accessing [facility] [employer] for meal deliver since    [employer]. They both drive but very minimally.  Karthik is very involved but feels best to bring in caregivers then doing that himself.  He is certain that they will not qualify for [facility] in home help.  We discussed and SW emailed to him the following information:    Here are some resources that may be of interest.  Please let me know if you need anything else.      Community Resources    [facility] - [phone]    [facility] - Homepage - Aging Services in [facility] (SmartzerneAppUpper - ASO.org)      Help at Your Door  Stay happy and healthy in your home - Help at Your Door    [employer] Adult Day Program - [phone].  Pickup Services Adult Day Services    Home Delivered Meals:  Meals on Wheels  https://meals-onAEGEA Medicalwheels.com/  Optage  https://www.PeopleJam.org/  Moms Meals  https://www.Apisphere.[employer]/    Senior Linkage Line  [place] Senior Services and Resources  Senior LinkAge Line / [place].gov    Lifeline Systems:    [facility]  BlueSwarm Alert systems and has  two options and are now using equipment called Medical Guardian,       Option one is digital and offers fall detection and can be used out in the community.  It costs $45.00 per month and has a one time activation fee of $65.00.  It needs to be charged every 2 - 3 days.       Option 2 is only used in the home and might work outside up to 500 feet away from home.  It is $48.00 /month and $58.00 / with fall detection.    https://www.fairview.org/overarching-care/home-care-and-hospice/lifeline     [facility] Lifeline  Please call [phone], extension [phone].  https://www.[facility].org/[facility]/services/lifeline      Below is information on some different Home Health Care agencies.   I've also included links to their web-sites if you want to do some additional internet research.  The rates, etc.  noted here may have changed.        [facility] [phone]  Home Health Aide - $37 per hour weekdays (3 hour minimum per shift);  $40 per hour weekends (4 hour  minimum per shift)  Minimum 2 visits per week, at least 2 weeks of service  RN Med Management (includes 2 visits per month) - $300 per month     https://www.Aardvark/[facility]-home-care-in-[facility]/    Dede Valadez   [phone]   Personal Care Services - $35+ per hour (3 hour minimum per shift)  Minimum of 9 hours per week  Medication Management - $600 per month ($150 one-time set up fee)  24/7 Live-in Care - starts at $444 per day     https://www.Clone/anastasia/home      Home Instead  [phone]   Home Care Services - $34 per hour weekdays (4 hour minimum per shift); $38 per hour weekends.    Specialized Services - $38 per hour weekdays; $40 per hour weekends  Minimum of 8 hours per week   24/7 Care - $624 per day (weekdays); $720 per day (weekends)     https://www.Traxian/505/about-us/service-area/[facility]-home-care-[facility]-[place]        [employer]   [phone]     Home Health Aide - $39 per hour (4 hour minimum per shift)   No weekly minimums.  Only one shift per month required  24/7 Live-in Care - $410 per day  RN Case Manager (oversight and assessment) - $250 one-time fee  Additional RN Case Manager time - $40 per 15-minute increments     https://www.Scalix/      More Resources to find in home care help:   Care.com   Sasha Ag, , Senior & Home Care Services  [facility].info               Current Behavioral Concerns: NA    Education Provided to patient: CC role, see info discussed and emailed above.      Health Maintenance Reviewed: Due/Overdue   Health Maintenance Due   Topic Date Due    ZOSTER IMMUNIZATION (1 of 2) Never done    DTAP/TDAP/TD IMMUNIZATION (1 - Tdap) 11/11/2009    COVID-19 Vaccine (5 - Pfizer series) 03/08/2023    INFLUENZA VACCINE (1) 09/01/2023      Clinical Pathway: None    Medication Management:  Medication review status: Medications reviewed and no changes reported per patient.      When in clinic 9/1/23        Functional Status:  Dependent ADLs:: Independent, Ambulation-cane  Dependent IADLs:: Cooking, Laundry, Shopping  Bed or wheelchair confined:: No  Mobility Status: Independent w/Device    Living Situation:  Current living arrangement:: I live in a private home with spouse  Type of residence:: Private home - stairs    Lifestyle & Psychosocial Needs:    Social Determinants of Health     Food Insecurity: Low Risk  (9/22/2023)    Food Insecurity     Within the past 12 months, did you worry that your food would run out before you got money to buy more?: No     Within the past 12 months, did the food you bought just not last and you didn t have money to get more?: No   Depression: Not at risk (7/18/2023)    PHQ-2     PHQ-2 Score: 0   Housing Stability: Low Risk  (9/22/2023)    Housing Stability     Do you have housing? : Yes     Are you worried about losing your housing?: No   Tobacco Use: Medium Risk (9/18/2023)    Patient History     Smoking Tobacco Use: Former     Smokeless Tobacco Use: Never     Passive Exposure: Not on file   Financial Resource Strain: Low Risk  (9/22/2023)    Financial Resource Strain     Within the past 12 months, have you or your family members you live with been unable to get utilities (heat, electricity) when it was really needed?: No   Alcohol Use: Not on file   Transportation Needs: Low Risk  (9/22/2023)    Transportation Needs     Within the past 12 months, has lack of transportation kept you from medical appointments, getting your medicines, non-medical meetings or appointments, work, or from getting things that you need?: No   Physical Activity: Not on file   Interpersonal Safety: Not on file   Stress: Not on file   Social Connections: Not on file     Inadequate nutrition (GOAL):: No  Tube Feeding: No  Inadequate activity/exercise (GOAL):: No  Significant changes in sleep pattern (GOAL): No  Transportation means:: Regular car     Orthodoxy or spiritual beliefs that impact treatment::  No  Mental health DX:: No  Mental health management concern (GOAL):: No  Chemical Dependency Status: No Current Concerns  Informal Support system:: Family             Resources and Interventions:  Current Resources:   Skilled Home Care Services: Physical Therapy, Skilled Nursing  Community Resources: Home Care  Supplies Currently Used at Home: Incontinence Supplies  Equipment Currently Used at Home: cane, straight  Employment Status: retired         Advance Care Plan/Directive  Advanced Care Plans/Directives on file:: No    Referrals Placed: Community Resources    The patient consented via Verbal consent to have contact information and resources sent via email in an unencrypted manner.     Care Plan:  Care Plan: Help At Home       Problem: Insufficient In-home support       Goal: Establish adequate home support       Expected End Date: 11/30/2023    Note:     Goal Statement: I will continue to take steps over the next 3 month(s) to identify and establish with in home/community supports which will allow me to maintain my current level of independence.  Barriers: Not sure what is available  Strengths: Strong family support  Patient expressed understanding of goal: Yes    Action steps to achieve this goal:  My family and I will explore community resources that can help me in my home.  I will continue to lean on informal supports of my: family  My family and I will review resources and supports sent to me via E-mail  My family and I will outreach to supports and consider establishing with ones I might find beneficial.  My family and I will follow up with scheduled appointments as recommended  I will take medications as prescribed.   My family will contact my care team with questions, concerns, support needs.   My family and I will use the clinic as a resource and I understand I can contact my clinic with 24/7 after hours services available.   I will discuss, review, schedule and complete any recommended overdue health  maintenance with my provider/care team.                                  Patient/Caregiver understanding: Yes    Outreach Frequency: monthly  Future Appointments                In 2 months Coming Giovana Long MD [facility],     In 5 months Ventura Shields MD [facility]            Plan: Patient will attend upcoming appointments.  Patient and family will review the resources discussed.  CHW will outreach again in one month.    JUAN PABLO Harvey   Care Coordination Team  [phone]

## 2023-10-17 NOTE — PROGRESS NOTES
PRIMARY DIAGNOSIS: Weakness/ Covid + OUTPATIENT/OBSERVATION GOALS TO BE MET BEFORE DISCHARGE:  1. ADLs back to baseline: No    2. Activity and level of assistance: Up with maximum assistance.      3. Pain status: Improved-controlled with oral pain medications.    4. Return to near baseline physical activity: No     Discharge Planner Nurse   Safe discharge environment identified: No  Barriers to discharge: Yes       Entered by: Coretta Salvador RN 02/25/2023         Pt is A&Ox 4. VSS on 1L, except temperature of 101.3.  Pt complains of body aches.  Pt given PRN tylenol for fever and body aches. Pt has frequent productive cough with light green phlegm. Regular diet. Pt Ax2. Will continue to monitor.   Please review provider order for any additional goals.   Nurse to notify provider when observation goals have been met and patient is ready for discharge.   (0) Normal symmetrical movements

## 2023-10-23 ENCOUNTER — MEDICAL CORRESPONDENCE (OUTPATIENT)
Dept: HEALTH INFORMATION MANAGEMENT | Facility: CLINIC | Age: 88
End: 2023-10-23

## 2023-10-24 ENCOUNTER — PATIENT OUTREACH (OUTPATIENT)
Dept: CARE COORDINATION | Facility: CLINIC | Age: 88
End: 2023-10-24
Payer: COMMERCIAL

## 2023-10-24 NOTE — PROGRESS NOTES
Clinic Care Coordination Contact  [facility]/Voicemail    Clinical Data: Care Coordinator Outreach    Outreach Documentation Number of Outreach Attempt   10/25/2023  11:29 AM 1       Left message on patient's voicemail with call back information and requested return call.    Plan: Care Coordinator will try to reach patient again in 10 business days.    DARIEL Hernandes, B.S. [facility] Care Coordination  [facility] Clinics:  [place], [place] and [place]  [phone]  [email or web address]

## 2023-11-09 ENCOUNTER — PATIENT OUTREACH (OUTPATIENT)
Dept: CARE COORDINATION | Facility: CLINIC | Age: 88
End: 2023-11-09
Payer: COMMERCIAL

## 2023-11-09 NOTE — PROGRESS NOTES
Clinic Care Coordination Contact  Community Health Worker Follow Up    Care Gaps:     Health Maintenance Due   Topic Date Due    ZOSTER IMMUNIZATION (1 of 2) Never done    RSV VACCINE (Pregnancy & 60+) (1 - 1-dose 60+ series) Never done    DTAP/TDAP/TD IMMUNIZATION (1 - Tdap) 11/11/2009    INFLUENZA VACCINE (1) 09/01/2023    COVID-19 Vaccine (5 - 2023-24 season) 09/01/2023    LIPID  11/08/2023    MICROALBUMIN  11/08/2023    FALL RISK ASSESSMENT  11/08/2023    MEDICARE ANNUAL WELLNESS VISIT  11/08/2023       Scheduled 11/22/23 with Dr. Shayne Long      Care Plan:   Care Plan: Help At Home Completed 11/9/2023      Problem: Insufficient In-home support  Resolved 11/9/2023      Goal: Establish adequate home support  Completed 11/9/2023      Expected End Date: 11/30/2023    This Visit's Progress: 100%    Note:     Goal Statement: I will continue to take steps over the next 3 month(s) to identify and establish with in home/community supports which will allow me to maintain my current level of independence.  Barriers: Not sure what is available  Strengths: Strong family support  Patient expressed understanding of goal: Yes    Action steps to achieve this goal:  My family and I will explore community resources that can help me in my home.  I will continue to lean on informal supports of my: family  My family and I will review resources and supports sent to me via E-mail  My family and I will outreach to supports and consider establishing with ones I might find beneficial.  My family and I will follow up with scheduled appointments as recommended  I will take medications as prescribed.   My family will contact my care team with questions, concerns, support needs.   My family and I will use the clinic as a resource and I understand I can contact my clinic with 24/7 after hours services available.   I will discuss, review, schedule and complete any recommended overdue health maintenance with my provider/care team.                                   Intervention and Education during outreach: Patient son, Karthik states that he did receive the resources from [facility]. However, patient has been improving and feels they are not needing any additional support at home. He has kept the resources and will refer back if needed in the future. Goal completed.   CHW reminded pts son of upcoming visit on 11/22/23.     CHW Plan: Routing to lead CC to review for maintenance, if accepted next outreach will be in two months.     Brittnee Tierney, DARIEL, B.S. [facility] Care Coordination  [facility]:  [place], [place] and [place]  [phone]  [email or web address]

## 2023-11-09 NOTE — PROGRESS NOTES
Clinic Care Coordination Contact  Patient has completed all goals with Clinic Care Coordination.  LEYLA CC  review the chart and confirms maintenance  is approved.    JUAN PABLO Harvey Care Coordination Team  [phone]

## 2023-11-22 ENCOUNTER — OFFICE VISIT (OUTPATIENT)
Dept: FAMILY MEDICINE | Facility: CLINIC | Age: 88
End: 2023-11-22
Payer: COMMERCIAL

## 2023-11-22 VITALS
SYSTOLIC BLOOD PRESSURE: 138 MMHG | TEMPERATURE: 97.5 F | HEART RATE: 55 BPM | DIASTOLIC BLOOD PRESSURE: 84 MMHG | WEIGHT: 201 LBS | BODY MASS INDEX: 28.14 KG/M2 | OXYGEN SATURATION: 97 % | RESPIRATION RATE: 17 BRPM | HEIGHT: 71 IN

## 2023-11-22 DIAGNOSIS — E78.5 HYPERLIPIDEMIA LDL GOAL <100: ICD-10-CM

## 2023-11-22 DIAGNOSIS — Z00.00 ENCOUNTER FOR MEDICARE ANNUAL WELLNESS EXAM: Primary | ICD-10-CM

## 2023-11-22 DIAGNOSIS — R63.0 ANOREXIA: ICD-10-CM

## 2023-11-22 DIAGNOSIS — Z23 NEED FOR PROPHYLACTIC VACCINATION AND INOCULATION AGAINST INFLUENZA: ICD-10-CM

## 2023-11-22 PROBLEM — K55.1 MESENTERIC ARTERY STENOSIS (H): Status: ACTIVE | Noted: 2023-09-06

## 2023-11-22 PROBLEM — K21.9 GASTROESOPHAGEAL REFLUX DISEASE: Status: ACTIVE | Noted: 2023-09-06

## 2023-11-22 PROBLEM — Z87.820 SIGNIFICANT CLOSED HEAD TRAUMA WITHIN PAST 3 MONTHS: Status: RESOLVED | Noted: 2017-01-24 | Resolved: 2023-11-22

## 2023-11-22 PROBLEM — Z79.01 CHRONIC ANTICOAGULATION: Status: ACTIVE | Noted: 2023-09-06

## 2023-11-22 PROBLEM — R09.A2 GLOBUS SENSATION: Status: ACTIVE | Noted: 2023-09-06

## 2023-11-22 PROBLEM — E88.09 HYPOALBUMINEMIA: Status: ACTIVE | Noted: 2023-09-06

## 2023-11-22 LAB
CHOLEST SERPL-MCNC: 118 MG/DL
HBA1C MFR BLD: 5 % (ref 0–5.6)
HDLC SERPL-MCNC: 34 MG/DL
LDLC SERPL CALC-MCNC: 63 MG/DL
NONHDLC SERPL-MCNC: 84 MG/DL
TRIGL SERPL-MCNC: 106 MG/DL

## 2023-11-22 PROCEDURE — 80061 LIPID PANEL: CPT | Performed by: FAMILY MEDICINE

## 2023-11-22 PROCEDURE — G0439 PPPS, SUBSEQ VISIT: HCPCS | Performed by: FAMILY MEDICINE

## 2023-11-22 PROCEDURE — 36415 COLL VENOUS BLD VENIPUNCTURE: CPT | Performed by: FAMILY MEDICINE

## 2023-11-22 PROCEDURE — 83036 HEMOGLOBIN GLYCOSYLATED A1C: CPT | Mod: GZ | Performed by: FAMILY MEDICINE

## 2023-11-22 PROCEDURE — G0008 ADMIN INFLUENZA VIRUS VAC: HCPCS | Performed by: FAMILY MEDICINE

## 2023-11-22 PROCEDURE — 90662 IIV NO PRSV INCREASED AG IM: CPT | Performed by: FAMILY MEDICINE

## 2023-11-22 RX ORDER — MIRTAZAPINE 7.5 MG/1
7.5 TABLET, FILM COATED ORAL AT BEDTIME
Qty: 90 TABLET | Refills: 3 | Status: SHIPPED | OUTPATIENT
Start: 2023-11-22

## 2023-11-22 RX ORDER — RESPIRATORY SYNCYTIAL VIRUS VACCINE 120MCG/0.5
0.5 KIT INTRAMUSCULAR ONCE
Qty: 1 EACH | Refills: 0 | Status: CANCELLED | OUTPATIENT
Start: 2023-11-22 | End: 2023-11-22

## 2023-11-22 RX ORDER — PANTOPRAZOLE SODIUM 40 MG/1
40 TABLET, DELAYED RELEASE ORAL DAILY
Qty: 90 TABLET | Refills: 3 | Status: CANCELLED | OUTPATIENT
Start: 2023-11-22

## 2023-11-22 RX ORDER — PETROLATUM 42 G/100G
OINTMENT TOPICAL
COMMUNITY
Start: 2023-09-07

## 2023-11-22 RX ORDER — CETIRIZINE HYDROCHLORIDE 10 MG/1
10 TABLET ORAL
COMMUNITY
Start: 2023-09-21

## 2023-11-22 RX ORDER — MIRTAZAPINE 7.5 MG/1
7.5 TABLET, FILM COATED ORAL AT BEDTIME
Qty: 30 TABLET | Refills: 3 | Status: CANCELLED | OUTPATIENT
Start: 2023-11-22

## 2023-11-22 RX ORDER — LISINOPRIL 5 MG/1
5 TABLET ORAL DAILY
COMMUNITY
Start: 2023-10-07

## 2023-11-22 RX ORDER — ACETAMINOPHEN 500 MG
1000 TABLET ORAL
COMMUNITY
Start: 2023-09-07

## 2023-11-22 SDOH — HEALTH STABILITY: PHYSICAL HEALTH
ON AVERAGE, HOW MANY DAYS PER WEEK DO YOU ENGAGE IN MODERATE TO STRENUOUS EXERCISE (LIKE A BRISK WALK)?: PATIENT DECLINED

## 2023-11-22 SDOH — HEALTH STABILITY: PHYSICAL HEALTH: ON AVERAGE, HOW MANY MINUTES DO YOU ENGAGE IN EXERCISE AT THIS LEVEL?: PATIENT DECLINED

## 2023-11-22 ASSESSMENT — ENCOUNTER SYMPTOMS
PALPITATIONS: 0
HEARTBURN: 0
SORE THROAT: 0
HEADACHES: 0
DIZZINESS: 0
CONSTIPATION: 0
FREQUENCY: 0
WEAKNESS: 1
NAUSEA: 0
HEMATURIA: 0
EYE PAIN: 0
FEVER: 0
MYALGIAS: 0
COUGH: 0
DIARRHEA: 0
ABDOMINAL PAIN: 0
ARTHRALGIAS: 1
SHORTNESS OF BREATH: 0
DYSURIA: 0
CHILLS: 0
JOINT SWELLING: 1
PARESTHESIAS: 0
NERVOUS/ANXIOUS: 0
HEMATOCHEZIA: 0

## 2023-11-22 ASSESSMENT — LIFESTYLE VARIABLES
HOW MANY STANDARD DRINKS CONTAINING ALCOHOL DO YOU HAVE ON A TYPICAL DAY: PATIENT DECLINED
SKIP TO QUESTIONS 9-10: 0
AUDIT-C TOTAL SCORE: -1
HOW OFTEN DO YOU HAVE SIX OR MORE DRINKS ON ONE OCCASION: PATIENT DECLINED
HOW OFTEN DO YOU HAVE A DRINK CONTAINING ALCOHOL: PATIENT DECLINED

## 2023-11-22 ASSESSMENT — SOCIAL DETERMINANTS OF HEALTH (SDOH)
ARE YOU MARRIED, WIDOWED, DIVORCED, SEPARATED, NEVER MARRIED, OR LIVING WITH A PARTNER?: PATIENT DECLINED
HOW OFTEN DO YOU ATTENT MEETINGS OF THE CLUB OR ORGANIZATION YOU BELONG TO?: PATIENT DECLINED
HOW OFTEN DO YOU ATTEND CHURCH OR RELIGIOUS SERVICES?: PATIENT DECLINED
DO YOU BELONG TO ANY CLUBS OR ORGANIZATIONS SUCH AS CHURCH GROUPS UNIONS, FRATERNAL OR ATHLETIC GROUPS, OR SCHOOL GROUPS?: PATIENT DECLINED
HOW OFTEN DO YOU GET TOGETHER WITH FRIENDS OR RELATIVES?: PATIENT DECLINED
IN A TYPICAL WEEK, HOW MANY TIMES DO YOU TALK ON THE PHONE WITH FAMILY, FRIENDS, OR NEIGHBORS?: PATIENT DECLINED

## 2023-11-22 ASSESSMENT — PAIN SCALES - GENERAL: PAINLEVEL: SEVERE PAIN (7)

## 2023-11-22 ASSESSMENT — ACTIVITIES OF DAILY LIVING (ADL): CURRENT_FUNCTION: NO ASSISTANCE NEEDED

## 2023-11-22 NOTE — PROGRESS NOTES
Prior to immunization administration, verified patients identity using patient s name and date of birth. Please see Immunization Activity for additional information.     Screening Questionnaire for Adult Immunization    Are you sick today?   No   Do you have allergies to medications, food, a vaccine component or latex?   No   Have you ever had a serious reaction after receiving a vaccination?   No   Do you have a long-term health problem with heart, lung, kidney, or metabolic disease (e.g., diabetes), asthma, a blood disorder, no spleen, complement component deficiency, a cochlear implant, or a spinal fluid leak?  Are you on long-term aspirin therapy?   No   Do you have cancer, leukemia, HIV/AIDS, or any other immune system problem?   No   Do you have a parent, brother, or sister with an immune system problem?   No   In the past 3 months, have you taken medications that affect  your immune system, such as prednisone, other steroids, or anticancer drugs; drugs for the treatment of rheumatoid arthritis, Crohn s disease, or psoriasis; or have you had radiation treatments?   No   Have you had a seizure, or a brain or other nervous system problem?   No   During the past year, have you received a transfusion of blood or blood    products, or been given immune (gamma) globulin or antiviral drug?   No   For women: Are you pregnant or is there a chance you could become       pregnant during the next month?   No   Have you received any vaccinations in the past 4 weeks?   No     Immunization questionnaire answers were all negative.      Patient instructed to remain in clinic for 15 minutes afterwards, and to report any adverse reactions.     Screening performed by Gisselle Jasso CMA on 11/22/2023 at 9:53 AM.

## 2023-11-22 NOTE — PATIENT INSTRUCTIONS
Take Furosemide once daily as needed for swelling.  If using for more than two weeks daily or every other day, follow up in clinic for labs to check potassium and kidney function.    Consider a thicker cream/lotion such as Vanicream or Eucerin.      Patient Education   Personalized Prevention Plan  You are due for the preventive services outlined below.  Your care team is available to assist you in scheduling these services.  If you have already completed any of these items, please share that information with your care team to update in your medical record.  Health Maintenance Due   Topic Date Due    Zoster (Shingles) Vaccine (1 of 2) Never done    RSV VACCINE (Pregnancy & 60+) (1 - 1-dose 60+ series) Never done    Diptheria Tetanus Pertussis (DTAP/TDAP/TD) Vaccine (1 - Tdap) 11/11/2009    Flu Vaccine (1) 09/01/2023    COVID-19 Vaccine (5 - 2023-24 season) 09/01/2023    Cholesterol Lab  11/08/2023    Kidney Microalbumin Urine Test  11/08/2023    ANNUAL REVIEW OF HM ORDERS  11/08/2023    Annual Wellness Visit  11/08/2023

## 2023-11-22 NOTE — COMMUNITY RESOURCES LIST (ENGLISH)
11/22/2023   [facility] AuditFile  N/A  For questions about this resource list or additional care needs, please contact your primary care clinic or care manager.  Phone: [phone]   Email: N/A   Address: [facility] [place] [address]   Hours: N/A        Hotlines and Helplines       Hotline - Housing crisis  1  [facility] (Main Office) Distance: 10.43 miles      Phone/Virtual   [address] [place] [address]  Language: English  Hours: Mon - Sun Open 24 Hours   Phone: (931) 482-5242 Email: info@[facility] Website: http://[facility].Facishare     2  [facility] Distance: 16.35 miles      Phone/Virtual   [address] [place] [address]  Language: English  Hours: Mon - Sun Open 24 Hours   Phone: (295) 936-4702 Email: communications@[facility].org Website: https://\[facility]\""-[place].org/oursaviourshousing/          Housing       Coordinated Entry access point  3  [facility] Distance: 13.81 miles      In-Person, Phone/Virtual   [address] [place] [address]  Language: English, Ethiopian  Hours: Mon - Fri 8:30 AM - 4:30 PM  Fees: Free   Phone: (295) 908-4728 Email: info@[facility].org Website: https://www.[facility].org/locations/downSt. Mary Rehabilitation [facility]-clinic/     4  [facility] ([facility] - Housing Services Distance: 16.33 miles      In-Person   [address] [place] [address]  Language: English  Hours: Mon - Fri 9:00 AM - 5:00 PM  Fees: Free   Phone: (133) 262-2893 Email: housing@[facility].org Website: http://www.[facility].org/housing     Drop-in center or day shelter  5  [facility] Distance: 14.79 miles      In-Person   [address] [place] [address]  Language: English  Hours: Mon - Fri 9:00 AM - 4:00 PM  Fees: Free   Phone: (328) 888-8260 Email: travisk@[employer] e[employer].org Website: http://listeninghouse.org     6  [facility] Distance: 16.75 miles      In-Person   [address] [place] [address]  Language: English  Hours: Mon - Fri 12:00 PM - 3:00 PM  Fees: Free   Phone: (607) 327-3085 Email: AppHero@[employer] Website: http://[facility].[employer]/     Housing search assistance  7  [facility] - Aging and Disability Services Distance: 9.59 miles      In-Person   [address] [place] [address]  Language: English  Hours: Mon - Fri 8:00 AM - 4:00 PM  Fees: Free, Insurance, Sliding Fee   Phone: (202) 353-4787 Email: uri@[facility] Website: https://www.[facility]./HealthFamily/Disabilities     8  [place] [employer] - Rental Homes for Future Homebuyers Program Distance: 10.6 miles      Phone/Virtual   [address] [place] [address]  Language: English  Hours: Mon - Fri 8:00 AM - 4:30 PM  Fees: Free   Phone: (439) 936-1884 Email: hra@[facility][1].[facility] Website: https://www.[facility]/hra/[place]-housing-and-iwcnzkugjcfex-itilzguna-hdz     Shelter for families  9  [facility] Distance: 5.95 miles      In-Person   [address] [place] [address]  Language: English  Hours: Mon - Sun Open 24 Hours  Fees: Free, Sliding Fee   Phone: (364) 309-3330 Ext.1 Email: info@[facility].[employer] Website: http://www.[facility].org     Shelter for individuals  10  [employer] [facility] ([facility]) [facility], [facility] Distance: 0.62 miles      In-Person   [address] [place] [address]  Language: English, Kuwaiti  Hours: Mon - Fri 8:00 AM - 4:30 PM  Fees: Free   Phone: (341) 104-1640 Email: info@capagency.org Website: http://www.capagency.org     11  [facility] and [place] - [facility] [place] [facility] - [facility] [place] [facility] Distance: 13.84 miles      In-Person   [address] [place] [address]  Language: English  Hours: Mon - Sun 5:00 PM - 10:00 AM  Fees: Free, Self Pay   Phone: (743) 870-1788 Email:  info@[employer].org Website: https://www.FRESSties.org/locations/[facility]-[place]/          Important Numbers & Websites       Emergency Services   911  City Services   311  Poison Control   (842) 107-8214  Suicide Prevention Lifeline   (644) 850-7206 (TALK)  Child Abuse Hotline   (819) 438-6839 (4-A-Child)  Sexual Assault Hotline   (440) 204-1791 (HOPE)  National Runaway Safeline   (776) 510-2232 (RUNAWAY)  All-Options Talkline   (293) 872-8636  Substance Abuse Referral   (998) 614-4585 (HELP)

## 2023-11-22 NOTE — PROGRESS NOTES
SUBJECTIVE:   Karthik is a 88 year old, presenting for the following:  Annual Visit and Imm/Inj (Flu Shot)        2023     8:35 AM   Additional Questions   Roomed by Gisselle Alford       Are you in the first 12 months of your Medicare coverage?  No    HPI    Neuropathy.  Tried Gabapentin in the past, it made him sick.  Not interested in trying Pregabalin right now, getting an OTC supplement of some sort to try for neuropathy.    Have you ever done Advance Care Planning? (For example, a Health Directive, POLST, or a discussion with a medical provider or your loved ones about your wishes): No, advance care planning information given to patient to review.  Patient declined advance care planning discussion at this time.       Fall risk  Fallen 2 or more times in the past year?: No  Any fall with injury in the past year?: No    Cognitive Screening   1) Repeat 3 items (Leader, Season, Table)    2) Clock draw: NORMAL  3) 3 item recall: Recalls 2 objects   Results: NORMAL clock, 1-2 items recalled: COGNITIVE IMPAIRMENT LESS LIKELY    Mini-CogTM Copyright NAHED [place]. Licensed by the author for use in [facility]; reprinted with permission (manuela@[facility]). All rights reserved.          Reviewed and updated as needed this visit by clinical staff   Tobacco  Allergies  Meds  Problems             Reviewed and updated as needed this visit by Provider      Problems            Social History     Tobacco Use     Smoking status: Former     Types: Cigarettes     Quit date: 1965     Years since quittin.9     Smokeless tobacco: Never   Substance Use Topics     Alcohol use: Yes     Alcohol/week: 0.0 standard drinks of alcohol     Comment: 2-3 drinks per week           2023     8:57 AM   Alcohol Use   Prescreen: >3 drinks/day or >7 drinks/week? No     Do you have a current opioid prescription? No  Do you use any other controlled substances or medications that are not prescribed by a provider? None    Current  providers sharing in care for this patient include:   Patient Care Team:  Giovana Mathias MD as PCP - General (Family Medicine)  Ventura Shields MD as Assigned Heart and Vascular Provider  Ventura Shields MD as MD (Cardiovascular Disease)  Lidia Taylor, LSW as Lead Care Coordinator (Primary Care - CC)  Brittnee Tierney, CHW as Community Health Worker (Primary Care - CC)  Curt Goldstein MD as Assigned PCP    The following health maintenance items are reviewed in Epic and correct as of today:  Health Maintenance   Topic Date Due     ZOSTER IMMUNIZATION (1 of 2) Never done     RSV VACCINE (Pregnancy & 60+) (1 - 1-dose 60+ series) Never done     DTAP/TDAP/TD IMMUNIZATION (1 - Tdap) 11/11/2009     COVID-19 Vaccine (5 - 2023-24 season) 09/01/2023     LIPID  11/08/2023     MICROALBUMIN  11/08/2023     BMP  09/18/2024     MEDICARE ANNUAL WELLNESS VISIT  11/22/2024     ANNUAL REVIEW OF HM ORDERS  11/22/2024     FALL RISK ASSESSMENT  11/22/2024     ADVANCE CARE PLANNING  11/22/2028     PHQ-2 (once per calendar year)  Completed     INFLUENZA VACCINE  Completed     Pneumococcal Vaccine: 65+ Years  Completed     URINALYSIS  Completed     IPV IMMUNIZATION  Aged Out     HPV IMMUNIZATION  Aged Out     MENINGITIS IMMUNIZATION  Aged Out     RSV MONOCLONAL ANTIBODY  Aged Out     CREATININE  Discontinued     Lab work is in process  Labs reviewed in EPIC  BP Readings from Last 3 Encounters:   11/22/23 138/84   09/18/23 123/75   07/18/23 115/77    Wt Readings from Last 3 Encounters:   11/22/23 91.2 kg (201 lb)   09/18/23 90.7 kg (200 lb)   07/18/23 88.9 kg (196 lb)                  Patient Active Problem List   Diagnosis     Generalized atherosclerosis     Carotid artery stenosis     Thoracic aortic ectasia (H24)     Localized edema     Hyperlipidemia LDL goal <100     DOMINGO (obstructive sleep apnea)     Essential hypertension with goal blood pressure less than 140/90     Pain in both feet      Coronary artery disease     CKD (chronic kidney disease) stage 2, GFR 60-89 ml/min     Generalized muscle weakness     Chronic a-fib (H)     Atypical chest pain     Neuropathy     Failure to thrive in adult     Anorexia     Intrinsic eczema     Hyponatremia     Physical deconditioning     Weight loss     Protein-calorie malnutrition (H24)     Infrarenal abdominal aortic aneurysm (AAA) without rupture (H24)     Coryza     Dysphagia, unspecified type     History of odynophagia     Chronic anticoagulation     Gastroesophageal reflux disease     Mesenteric artery stenosis (H24)     Hypoalbuminemia     Globus sensation     Past Surgical History:   Procedure Laterality Date     COLONOSCOPY  2013    Dr. Sorenson [facility] BYPASS GRAFT OTHR,CAROTID      carotid stent       Social History     Tobacco Use     Smoking status: Former     Types: Cigarettes     Quit date: 1965     Years since quittin.9     Smokeless tobacco: Never   Substance Use Topics     Alcohol use: Yes     Alcohol/week: 0.0 standard drinks of alcohol     Comment: 2-3 drinks per week     Family History   Problem Relation Age of Onset     Heart Disease Mother      Diabetes Father      No Known Problems Brother      No Known Problems Sister      No Known Problems Brother      No Known Problems Sister      Breast Cancer No family hx of          Current Outpatient Medications   Medication Sig Dispense Refill     acetaminophen (TYLENOL) 500 MG tablet Take 1,000 mg by mouth       atenolol (TENORMIN) 50 MG tablet 1 tab in morning. 90 tablet 3     atorvastatin (LIPITOR) 20 MG tablet Take 1 tablet (20 mg) by mouth daily 90 tablet 3     cetirizine (ZYRTEC) 10 MG tablet Take 10 mg by mouth       clopidogrel (PLAVIX) 75 MG tablet Take 1 tablet (75 mg) by mouth daily 90 tablet 3     Emollient (HYDROPHOR) OINT Apply topically to the affected area(s) daily at bedtime. Indications: skin care       furosemide (LASIX) 20 MG tablet Take 1 tablet (20 mg) by mouth  daily 90 tablet 3     ipratropium (ATROVENT) 0.06 % nasal spray Spray 2 sprays into both nostrils 4 times daily       lisinopril (ZESTRIL) 5 MG tablet        mirtazapine (REMERON) 7.5 MG tablet Take 1 tablet (7.5 mg) by mouth at bedtime 90 tablet 3     mupirocin (BACTROBAN) 2 % external ointment Apply topically 3 times daily       pantoprazole (PROTONIX) 40 MG EC tablet Take 1 tablet (40 mg) by mouth daily 90 tablet 3     rivaroxaban ANTICOAGULANT (XARELTO) 15 MG TABS tablet Take 1 tablet (15 mg) by mouth daily (with dinner) 90 tablet 3     triamcinolone (KENALOG) 0.1 % external cream Apply topically 2 times daily 453.6 g 0     triamcinolone (KENALOG) 0.1 % external ointment Apply topically 2 times daily       Allergies   Allergen Reactions     Epinephrine Shortness Of Breath     Very rapid heartrate     Epinephrine      Other reaction(s): Cardiac Arrest  Per pt and family     Dabigatran Itching     Recent Labs   Lab Test 11/22/23  1051 09/18/23  1328 03/02/23  0549 02/27/23  0523 02/26/23  0556 02/25/23  1416 02/24/23  0840 02/23/23  1342 11/08/22  1049 10/01/21  0841 10/01/21  0841 10/13/20  0920 08/21/19  0901 09/18/18  1009 06/20/17  1011   A1C 5.0  --   --   --   --   --   --   --   --   --   --   --   --  5.8*  --    LDL  --   --   --   --   --   --   --   --  65  --  42 37   < >  --  50   HDL  --   --   --   --   --   --   --   --  46  --  30* 36*   < >  --  34*   TRIG  --   --   --   --   --   --   --   --  153*  --  192* 168*   < >  --  139   ALT  --  10  --   --   --  19  --  9*  --    < > 20 20   < >  --  19   CR  --  1.00 0.86 0.94  0.94   < > 1.24*   < > 1.25* 1.28*   < > 1.34* 1.16  --   --  1.09   GFRESTIMATED  --  73 84 78  78   < > 56*   < > 56* 55*   < > 48* 57*  --   --  65   GFRESTBLACK  --   --   --   --   --   --   --   --   --   --   --  66  --   --  78   POTASSIUM  --  5.0  --  3.7   < > 4.0   < > 4.3 4.6   < > 4.3 4.3  --   --  4.5   TSH  --  3.32  --   --   --   --   --   --   --   --    "--   --   --  1.84  --     < > = values in this interval not displayed.        Review of Systems  Constitutional, HEENT, cardiovascular, pulmonary, gi and gu systems are negative, except as otherwise noted.    OBJECTIVE:   /84 (BP Location: Right arm, Patient Position: Sitting, Cuff Size: Adult Regular)   Pulse 55   Temp 97.5  F (36.4  C) (Oral)   Resp 17   Ht 1.803 m (5' 11\")   Wt 91.2 kg (201 lb)   SpO2 97%   BMI 28.03 kg/m   Estimated body mass index is 28.03 kg/m  as calculated from the following:    Height as of this encounter: 1.803 m (5' 11\").    Weight as of this encounter: 91.2 kg (201 lb).  Physical Exam  GENERAL: healthy, alert and no distress  RESP: lungs clear to auscultation - no rales, rhonchi or wheezes  CV: regular rates and rhythm  PSYCH: mentation appears normal, affect normal/bright    Diagnostic Test Results:  Labs reviewed in Epic    ASSESSMENT / PLAN:       ICD-10-CM    1. Encounter for Medicare annual wellness exam  Z00.00 Hemoglobin A1c     Hemoglobin A1c      2. Anorexia  R63.0 mirtazapine (REMERON) 7.5 MG tablet      3. Hyperlipidemia LDL goal <100  E78.5 Lipid panel reflex to direct LDL Fasting     Lipid panel reflex to direct LDL Fasting      4. Need for prophylactic vaccination and inoculation against influenza  Z23 INFLUENZA VACCINE 65+ (FLUZONE HD)          Patient has been advised of split billing requirements and indicates understanding: Yes      COUNSELING:  Reviewed preventive health counseling, as reflected in patient instructions        He reports that he quit smoking about 58 years ago. His smoking use included cigarettes. He has never used smokeless tobacco.      Appropriate preventive services were discussed with this patient, including applicable screening as appropriate for fall prevention, nutrition, physical activity, Tobacco-use cessation, weight loss and cognition.  Checklist reviewing preventive services available has been given to the patient.    Reviewed " patients plan of care and provided an AVS. The Basic Care Plan (routine screening as documented in Health Maintenance) for Issac meets the Care Plan requirement. This Care Plan has been established and reviewed with the Patient and spouse.        Giovana Long MD  [facility]    Identified Health Risks:  I have reviewed Opioid Use Disorder and Substance Use Disorder risk factors and made any needed referrals.

## 2024-01-08 ENCOUNTER — PATIENT OUTREACH (OUTPATIENT)
Dept: CARE COORDINATION | Facility: CLINIC | Age: 89
End: 2024-01-08
Payer: COMMERCIAL

## 2024-01-08 NOTE — PROGRESS NOTES
Clinic Care Coordination Contact    Assessment: Care Coordinator contacted patient for 2 month follow up.  Patient has continued to follow the plan of care and assessment is negative for any new needs or concerns.    Enrollment status: Graduated.      Plan: No further outreaches at this time.  Patient will continue to follow the plan of care.  If new needs arise a new Care Coordination referral may be placed.      JUAN PABLO Harvey   Care Coordination Team  [phone]

## 2024-01-08 NOTE — LETTER
[facility] CARE COORDINATION    January 8, 2024    Issac Zelaya  [address] [place] [address]    Dear Issac,  Your Care Team congratulates you on your journey to maintain wellness. This document will help guide you on your journey to maintain a healthy lifestyle.  You can use this to help you overcome any barriers you may encounter.  If you should have any questions or concerns, you can contact the members of your Care Team or contact your Primary Care Clinic for assistance.     Health Maintenance  Health Maintenance Reviewed:      My Access Plan  Medical Emergency 911   Primary Clinic Line [facility] - [phone]   24 Hour Appointment Line [phone] or  [phone]) (toll-free)   24 Hour Nurse Line [phone] (toll-free)   Preferred Urgent Care     Preferred Hospital     Preferred Pharmacy [facility] - A MAIL ORDER [facility]     Behavioral Health Crisis Line The National Suicide Prevention Lifeline at 1-928.522.2318 or 911     My Care Team Members  Patient Care Team         Relationship Specialty Notifications Start End    Coming Giovana Long MD PCP - General Family Medicine  11/22/23     Phone: [phone] Fax: [phone] [address] [address] [facility] [address]    Ventura Shields MD Assigned Heart and Vascular Provider   10/23/20     Pager: [phone]         Ventura Shields MD MD Cardiovascular Disease  9/7/22     Pager: [phone]         Lidia Taylor LSW Lead Care Coordinator Primary Care - CC Admissions 9/19/23 1/8/24    Phone: [phone]         Brittnee Tierney [facility] Community Health Worker Primary Care - CC Admissions 9/22/23 1/8/24    Phone: [phone]         Coming Giovana Long MD Assigned PCP   12/2/23     Phone: [phone] Fax: [phone] [address] [facility] [address]                 Goals    None          Advance Care Plans/Directives Type:       Thank you for providing us with your Advance Directive. We will keep this on file and recommend that it be updated every 2 years, if your health situation changes, if there is a death of one of your health care agents, and/or if there are changes in your marital status.    It has been your Clinic Care Team's pleasure to work with you on your goals.    Regards,  Your Clinic Care Team

## 2024-01-08 NOTE — PROGRESS NOTES
Clinic Care Coordination Contact    Assessment: Care Coordinator contacted patients son, Karthik for 2 month follow up.  Patient has continued to follow the plan of care and assessment is negative for any new needs or concerns.  Karthik states that patient is still doing really well.     Enrollment status: Graduation pending review from lead CC.      Plan: No further outreaches at this time. Routing to lead CC to review for graduation.     DARIEL Hernandes, B.S. [facility] Care Coordination  [facility] Clinics:  [place], [place] and [place]  [phone]  [email or web address]

## 2024-02-19 ENCOUNTER — OFFICE VISIT (OUTPATIENT)
Dept: CARDIOLOGY | Facility: CLINIC | Age: 89
End: 2024-02-19
Payer: COMMERCIAL

## 2024-02-19 VITALS
WEIGHT: 209.3 LBS | DIASTOLIC BLOOD PRESSURE: 62 MMHG | HEIGHT: 71 IN | SYSTOLIC BLOOD PRESSURE: 116 MMHG | BODY MASS INDEX: 29.3 KG/M2 | HEART RATE: 88 BPM

## 2024-02-19 DIAGNOSIS — I10 HYPERTENSION GOAL BP (BLOOD PRESSURE) < 140/90: ICD-10-CM

## 2024-02-19 DIAGNOSIS — E78.5 HYPERLIPIDEMIA LDL GOAL <100: ICD-10-CM

## 2024-02-19 DIAGNOSIS — I10 ESSENTIAL HYPERTENSION, BENIGN: ICD-10-CM

## 2024-02-19 DIAGNOSIS — G47.33 OSA (OBSTRUCTIVE SLEEP APNEA): ICD-10-CM

## 2024-02-19 DIAGNOSIS — I65.22 STENOSIS OF LEFT CAROTID ARTERY: ICD-10-CM

## 2024-02-19 DIAGNOSIS — I48.20 CHRONIC ATRIAL FIBRILLATION (H): ICD-10-CM

## 2024-02-19 DIAGNOSIS — I10 ESSENTIAL HYPERTENSION WITH GOAL BLOOD PRESSURE LESS THAN 140/90: ICD-10-CM

## 2024-02-19 PROCEDURE — 99214 OFFICE O/P EST MOD 30 MIN: CPT | Performed by: INTERNAL MEDICINE

## 2024-02-19 RX ORDER — ATENOLOL 50 MG/1
TABLET ORAL
Qty: 90 TABLET | Refills: 3 | Status: SHIPPED | OUTPATIENT
Start: 2024-02-19

## 2024-02-19 RX ORDER — CLOPIDOGREL BISULFATE 75 MG/1
75 TABLET ORAL DAILY
Qty: 90 TABLET | Refills: 3 | Status: SHIPPED | OUTPATIENT
Start: 2024-02-19

## 2024-02-19 RX ORDER — ATORVASTATIN CALCIUM 20 MG/1
20 TABLET, FILM COATED ORAL DAILY
Qty: 90 TABLET | Refills: 3 | Status: SHIPPED | OUTPATIENT
Start: 2024-02-19

## 2024-02-19 RX ORDER — FUROSEMIDE 20 MG
40 TABLET ORAL DAILY
Qty: 120 TABLET | Refills: 3 | Status: SHIPPED | OUTPATIENT
Start: 2024-02-19

## 2024-02-19 NOTE — PROGRESS NOTES
CARDIOLOGY VISIT    REASON FOR VISIT: A-fib    SUBJECTIVE:  89-year-old male seen for follow-up of chronic atrial fibrillation.  He also has hypertension, TIA, carotid artery disease (left ICA stent in 2007), neuropathy, and sleep apnea.       He was diagnosed with A. fib in 2013 at the time of colonoscopy.  He has been on Xarelto 15mg daily (lower dose) and also because of his carotid disease, clopidogrel daily.       Echo August 2019 (afib) showed EF 60%, mild RV dilation with normal function, 1+ TR, RVSP 31 mmHg, sinus of Valsalva 3.8 cm, ascending aorta 4.2 cm.        Carotid ultrasound November 2020 showed less than 50% stenosis bilaterally, left carotid stent patent.     Carotid ultrasound November 2022 showed less than 50% stenosis of the right, patent left carotid stent with no restenosis.    Echo February 2023 showed EF 55%, no valve disease, aorta 4.1 cm.    He is quite sedentary.  He can walk short distances in his townhouse, but nothing further.  He has neuropathy that bothers him a lot.  His mild lower extremity edema is about the same.  He denies any chest pain or shortness of breath.  His main complaint is eczema and easy bruising from his blood thinners.    MEDICATIONS:  Current Outpatient Medications   Medication    acetaminophen (TYLENOL) 500 MG tablet    atenolol (TENORMIN) 50 MG tablet    atorvastatin (LIPITOR) 20 MG tablet    cetirizine (ZYRTEC) 10 MG tablet    clopidogrel (PLAVIX) 75 MG tablet    Emollient (HYDROPHOR) OINT    furosemide (LASIX) 20 MG tablet    ipratropium (ATROVENT) 0.06 % nasal spray    lisinopril (ZESTRIL) 5 MG tablet    mirtazapine (REMERON) 7.5 MG tablet    mupirocin (BACTROBAN) 2 % external ointment    pantoprazole (PROTONIX) 40 MG EC tablet    rivaroxaban ANTICOAGULANT (XARELTO) 15 MG TABS tablet    triamcinolone (KENALOG) 0.1 % external cream    triamcinolone (KENALOG) 0.1 % external ointment     No current facility-administered medications for this visit.  "      ALLERGIES:  Allergies   Allergen Reactions    Epinephrine Shortness Of Breath     Very rapid heartrate    Epinephrine      Other reaction(s): Cardiac Arrest  Per pt and family    Dabigatran Itching       REVIEW OF SYSTEMS:  Constitutional:  No weight loss, fever, chills  HEENT:  Eyes:  No visual loss, blurred vision, double vision or yellow sclerae. No hearing loss, sneezing, congestion, runny nose or sore throat.  Skin:  No rash or itching.  Cardiovascular: per HPI  Respiratory: per HPI  GI:  No anorexia, nausea, vomiting or diarrhea. No abdominal pain or blood.  :  No dysurea, hematuria  Neurologic:  No headache, paralysis, ataxia, numbness or tingling in the extremities. No change in bowel or bladder control.  Musculoskeletal:  No muscle pain  Hematologic:  No bleeding or bruising.  Lymphatics:  No enlarged nodes. No history of splenectomy.  Endocrine:  No reports of sweating, cold or heat intolerance. No polyuria or polydipsia.  Allergies:  No history of asthma, hives, eczema or rhinitis.    PHYSICAL EXAM:  /62   Pulse 88   Ht 1.803 m (5' 11\")   Wt 94.9 kg (209 lb 4.8 oz)   BMI 29.19 kg/m    Constitutional: awake, alert, no distress  Eyes: PERRL, sclera nonicteric  ENT: trachea midline  Respiratory: Lungs clear  Cardiovascular: Regular rate, totally irregular rhythm, 1+ pitting edema to the mid shins  GI: nondistended, nontender, bowel sounds present  Lymph/Hematologic: no lymphadenopathy  Skin: dry, no rash  Musculoskeletal: good muscle tone, strength 5/5 in upper and lower extremities  Neurologic: no focal deficits  Neuropsychiatric: appropriate affact    DATA:  Lab: September 2023: Potassium 5.0, creatinine 1.0  Recent Labs   Lab Test 11/22/23  1051 11/08/22  1049   CHOL 118 142   HDL 34* 46   LDL 63 65   TRIG 106 153*     ASSESSMENT:  89-year-old male seen for atrial fibrillation.  His A-fib is well-controlled.  He does have some mild lower extremity edema which is likely multifactorial.  " Furosemide will be doubled.  Blood pressure seems controlled today.  He has no symptoms, but is quite sedentary.    RECOMMENDATIONS:  1.  Chronic A-fib  -Continue atenolol and Xarelto    2.  Carotid disease  -Carotid ultrasound on follow-up    3.  Probable diastolic dysfunction  -Increase furosemide to 40 mg once daily    Follow-up in 1 year with carotid ultrasound.    Ventura Shields MD  Cardiology - [facility]  Pager:  [phone]  Text Page  February 19, 2024

## 2024-02-19 NOTE — LETTER
2/19/2024    Giovana Long MD  [address] [facility] [address]    RE: Issac Zelaya       Dear Colleague,     I had the pleasure of seeing Issac KASHMIR Zelaya in the [facility].  CARDIOLOGY VISIT    REASON FOR VISIT: A-fib    SUBJECTIVE:  89-year-old male seen for follow-up of chronic atrial fibrillation.  He also has hypertension, TIA, carotid artery disease (left ICA stent in 2007), neuropathy, and sleep apnea.       He was diagnosed with A. fib in 2013 at the time of colonoscopy.  He has been on Xarelto 15mg daily (lower dose) and also because of his carotid disease, clopidogrel daily.       Echo August 2019 (afib) showed EF 60%, mild RV dilation with normal function, 1+ TR, RVSP 31 mmHg, sinus of Valsalva 3.8 cm, ascending aorta 4.2 cm.        Carotid ultrasound November 2020 showed less than 50% stenosis bilaterally, left carotid stent patent.     Carotid ultrasound November 2022 showed less than 50% stenosis of the right, patent left carotid stent with no restenosis.    Echo February 2023 showed EF 55%, no valve disease, aorta 4.1 cm.    He is quite sedentary.  He can walk short distances in his townhouse, but nothing further.  He has neuropathy that bothers him a lot.  His mild lower extremity edema is about the same.  He denies any chest pain or shortness of breath.  His main complaint is eczema and easy bruising from his blood thinners.    MEDICATIONS:  Current Outpatient Medications   Medication    acetaminophen (TYLENOL) 500 MG tablet    atenolol (TENORMIN) 50 MG tablet    atorvastatin (LIPITOR) 20 MG tablet    cetirizine (ZYRTEC) 10 MG tablet    clopidogrel (PLAVIX) 75 MG tablet    Emollient (HYDROPHOR) OINT    furosemide (LASIX) 20 MG tablet    ipratropium (ATROVENT) 0.06 % nasal spray    lisinopril (ZESTRIL) 5 MG tablet    mirtazapine (REMERON) 7.5 MG tablet    mupirocin (BACTROBAN) 2 % external ointment    pantoprazole (PROTONIX) 40 MG EC tablet    rivaroxaban  "ANTICOAGULANT (XARELTO) 15 MG TABS tablet    triamcinolone (KENALOG) 0.1 % external cream    triamcinolone (KENALOG) 0.1 % external ointment     No current facility-administered medications for this visit.       ALLERGIES:  Allergies   Allergen Reactions    Epinephrine Shortness Of Breath     Very rapid heartrate    Epinephrine      Other reaction(s): Cardiac Arrest  Per pt and family    Dabigatran Itching       REVIEW OF SYSTEMS:  Constitutional:  No weight loss, fever, chills  HEENT:  Eyes:  No visual loss, blurred vision, double vision or yellow sclerae. No hearing loss, sneezing, congestion, runny nose or sore throat.  Skin:  No rash or itching.  Cardiovascular: per HPI  Respiratory: per HPI  GI:  No anorexia, nausea, vomiting or diarrhea. No abdominal pain or blood.  :  No dysurea, hematuria  Neurologic:  No headache, paralysis, ataxia, numbness or tingling in the extremities. No change in bowel or bladder control.  Musculoskeletal:  No muscle pain  Hematologic:  No bleeding or bruising.  Lymphatics:  No enlarged nodes. No history of splenectomy.  Endocrine:  No reports of sweating, cold or heat intolerance. No polyuria or polydipsia.  Allergies:  No history of asthma, hives, eczema or rhinitis.    PHYSICAL EXAM:  /62   Pulse 88   Ht 1.803 m (5' 11\")   Wt 94.9 kg (209 lb 4.8 oz)   BMI 29.19 kg/m    Constitutional: awake, alert, no distress  Eyes: PERRL, sclera nonicteric  ENT: trachea midline  Respiratory: Lungs clear  Cardiovascular: Regular rate, totally irregular rhythm, 1+ pitting edema to the mid shins  GI: nondistended, nontender, bowel sounds present  Lymph/Hematologic: no lymphadenopathy  Skin: dry, no rash  Musculoskeletal: good muscle tone, strength 5/5 in upper and lower extremities  Neurologic: no focal deficits  Neuropsychiatric: appropriate affact    DATA:  Lab: September 2023: Potassium 5.0, creatinine 1.0  Recent Labs   Lab Test 11/22/23  1051 11/08/22  1049   CHOL 118 142   HDL 34* " 46   LDL 63 65   TRIG 106 153*     ASSESSMENT:  89-year-old male seen for atrial fibrillation.  His A-fib is well-controlled.  He does have some mild lower extremity edema which is likely multifactorial.  Furosemide will be doubled.  Blood pressure seems controlled today.  He has no symptoms, but is quite sedentary.    RECOMMENDATIONS:  1.  Chronic A-fib  -Continue atenolol and Xarelto    2.  Carotid disease  -Carotid ultrasound on follow-up    3.  Probable diastolic dysfunction  -Increase furosemide to 40 mg once daily    Follow-up in 1 year with carotid ultrasound.    Ventura Shields MD  Cardiology - [facility]  Pager:  [phone]  Text Page  February 19, 2024    Thank you for allowing me to participate in the care of your patient.      Sincerely,     Ventura Shields MD   [facility]  cc: No referring provider defined for this encounter.

## 2024-11-19 DIAGNOSIS — G47.33 OSA (OBSTRUCTIVE SLEEP APNEA): ICD-10-CM

## 2024-11-19 RX ORDER — FUROSEMIDE 20 MG/1
40 TABLET ORAL DAILY
Qty: 120 TABLET | Refills: 3 | Status: SHIPPED | OUTPATIENT
Start: 2024-11-19

## 2024-11-26 ENCOUNTER — OFFICE VISIT (OUTPATIENT)
Dept: FAMILY MEDICINE | Facility: CLINIC | Age: 89
End: 2024-11-26
Payer: COMMERCIAL

## 2024-11-26 VITALS
WEIGHT: 192.1 LBS | HEIGHT: 68 IN | BODY MASS INDEX: 29.12 KG/M2 | RESPIRATION RATE: 16 BRPM | SYSTOLIC BLOOD PRESSURE: 142 MMHG | TEMPERATURE: 98.2 F | OXYGEN SATURATION: 99 % | DIASTOLIC BLOOD PRESSURE: 86 MMHG | HEART RATE: 57 BPM

## 2024-11-26 DIAGNOSIS — Z23 NEED FOR PROPHYLACTIC VACCINATION AND INOCULATION AGAINST INFLUENZA: ICD-10-CM

## 2024-11-26 DIAGNOSIS — E78.5 HYPERLIPIDEMIA LDL GOAL <100: ICD-10-CM

## 2024-11-26 DIAGNOSIS — Z23 NEED FOR SHINGLES VACCINE: ICD-10-CM

## 2024-11-26 DIAGNOSIS — N18.2 CKD (CHRONIC KIDNEY DISEASE) STAGE 2, GFR 60-89 ML/MIN: ICD-10-CM

## 2024-11-26 DIAGNOSIS — E46 PROTEIN-CALORIE MALNUTRITION, UNSPECIFIED SEVERITY (H): ICD-10-CM

## 2024-11-26 DIAGNOSIS — Z29.11 NEED FOR VACCINATION AGAINST RESPIRATORY SYNCYTIAL VIRUS: ICD-10-CM

## 2024-11-26 DIAGNOSIS — G62.9 NEUROPATHY: ICD-10-CM

## 2024-11-26 DIAGNOSIS — Z13.29 SCREENING FOR THYROID DISORDER: ICD-10-CM

## 2024-11-26 DIAGNOSIS — L20.84 INTRINSIC ECZEMA: ICD-10-CM

## 2024-11-26 DIAGNOSIS — E55.9 VITAMIN D DEFICIENCY: ICD-10-CM

## 2024-11-26 DIAGNOSIS — R13.10 DYSPHAGIA, UNSPECIFIED TYPE: ICD-10-CM

## 2024-11-26 DIAGNOSIS — Z13.1 SCREENING FOR DIABETES MELLITUS: ICD-10-CM

## 2024-11-26 DIAGNOSIS — Z23 NEED FOR TDAP VACCINATION: ICD-10-CM

## 2024-11-26 DIAGNOSIS — Z00.00 ENCOUNTER FOR MEDICARE ANNUAL WELLNESS EXAM: Primary | ICD-10-CM

## 2024-11-26 DIAGNOSIS — R63.0 ANOREXIA: ICD-10-CM

## 2024-11-26 DIAGNOSIS — R62.7 FAILURE TO THRIVE IN ADULT: ICD-10-CM

## 2024-11-26 DIAGNOSIS — I25.10 CORONARY ARTERY DISEASE: ICD-10-CM

## 2024-11-26 LAB
ALBUMIN SERPL BCG-MCNC: 4.1 G/DL (ref 3.5–5.2)
ALP SERPL-CCNC: 61 U/L (ref 40–150)
ALT SERPL W P-5'-P-CCNC: 9 U/L (ref 0–70)
ANION GAP SERPL CALCULATED.3IONS-SCNC: 11 MMOL/L (ref 7–15)
AST SERPL W P-5'-P-CCNC: 19 U/L (ref 0–45)
BILIRUB SERPL-MCNC: 0.6 MG/DL
BUN SERPL-MCNC: 21.1 MG/DL (ref 8–23)
CALCIUM SERPL-MCNC: 9.9 MG/DL (ref 8.8–10.4)
CHLORIDE SERPL-SCNC: 98 MMOL/L (ref 98–107)
CHOLEST SERPL-MCNC: 110 MG/DL
CREAT SERPL-MCNC: 1.07 MG/DL (ref 0.67–1.17)
CREAT UR-MCNC: 85.8 MG/DL
EGFRCR SERPLBLD CKD-EPI 2021: 67 ML/MIN/1.73M2
ERYTHROCYTE [DISTWIDTH] IN BLOOD BY AUTOMATED COUNT: 13.2 % (ref 10–15)
EST. AVERAGE GLUCOSE BLD GHB EST-MCNC: 111 MG/DL
FASTING STATUS PATIENT QL REPORTED: ABNORMAL
FASTING STATUS PATIENT QL REPORTED: ABNORMAL
GLUCOSE SERPL-MCNC: 91 MG/DL (ref 70–99)
HBA1C MFR BLD: 5.5 % (ref 0–5.6)
HCO3 SERPL-SCNC: 24 MMOL/L (ref 22–29)
HCT VFR BLD AUTO: 37.5 % (ref 40–53)
HDLC SERPL-MCNC: 38 MG/DL
HGB BLD-MCNC: 12.9 G/DL (ref 13.3–17.7)
LDLC SERPL CALC-MCNC: 52 MG/DL
MCH RBC QN AUTO: 33 PG (ref 26.5–33)
MCHC RBC AUTO-ENTMCNC: 34.4 G/DL (ref 31.5–36.5)
MCV RBC AUTO: 96 FL (ref 78–100)
MICROALBUMIN UR-MCNC: 18.2 MG/L
MICROALBUMIN/CREAT UR: 21.21 MG/G CR (ref 0–17)
NONHDLC SERPL-MCNC: 72 MG/DL
PLATELET # BLD AUTO: 158 10E3/UL (ref 150–450)
POTASSIUM SERPL-SCNC: 4.9 MMOL/L (ref 3.4–5.3)
PREALB SERPL-MCNC: 18.9 MG/DL (ref 20–40)
PROT SERPL-MCNC: 7.6 G/DL (ref 6.4–8.3)
RBC # BLD AUTO: 3.91 10E6/UL (ref 4.4–5.9)
SODIUM SERPL-SCNC: 133 MMOL/L (ref 135–145)
T4 FREE SERPL-MCNC: 0.74 NG/DL (ref 0.9–1.7)
TRIGL SERPL-MCNC: 101 MG/DL
TSH SERPL DL<=0.005 MIU/L-ACNC: 4.33 UIU/ML (ref 0.3–4.2)
VIT B12 SERPL-MCNC: 556 PG/ML (ref 232–1245)
VIT D+METAB SERPL-MCNC: 34 NG/ML (ref 20–50)
WBC # BLD AUTO: 5.3 10E3/UL (ref 4–11)

## 2024-11-26 PROCEDURE — 36415 COLL VENOUS BLD VENIPUNCTURE: CPT | Performed by: FAMILY MEDICINE

## 2024-11-26 PROCEDURE — 84207 ASSAY OF VITAMIN B-6: CPT | Mod: 90 | Performed by: FAMILY MEDICINE

## 2024-11-26 PROCEDURE — G0439 PPPS, SUBSEQ VISIT: HCPCS | Performed by: FAMILY MEDICINE

## 2024-11-26 PROCEDURE — 82607 VITAMIN B-12: CPT | Performed by: FAMILY MEDICINE

## 2024-11-26 PROCEDURE — 80053 COMPREHEN METABOLIC PANEL: CPT | Performed by: FAMILY MEDICINE

## 2024-11-26 PROCEDURE — 80061 LIPID PANEL: CPT | Performed by: FAMILY MEDICINE

## 2024-11-26 PROCEDURE — 82306 VITAMIN D 25 HYDROXY: CPT | Performed by: FAMILY MEDICINE

## 2024-11-26 PROCEDURE — 90662 IIV NO PRSV INCREASED AG IM: CPT | Performed by: FAMILY MEDICINE

## 2024-11-26 PROCEDURE — 84443 ASSAY THYROID STIM HORMONE: CPT | Performed by: FAMILY MEDICINE

## 2024-11-26 PROCEDURE — 82043 UR ALBUMIN QUANTITATIVE: CPT | Performed by: FAMILY MEDICINE

## 2024-11-26 PROCEDURE — 83036 HEMOGLOBIN GLYCOSYLATED A1C: CPT | Performed by: FAMILY MEDICINE

## 2024-11-26 PROCEDURE — 84134 ASSAY OF PREALBUMIN: CPT | Performed by: FAMILY MEDICINE

## 2024-11-26 PROCEDURE — 99214 OFFICE O/P EST MOD 30 MIN: CPT | Mod: 25 | Performed by: FAMILY MEDICINE

## 2024-11-26 PROCEDURE — G0008 ADMIN INFLUENZA VIRUS VAC: HCPCS | Performed by: FAMILY MEDICINE

## 2024-11-26 PROCEDURE — 82570 ASSAY OF URINE CREATININE: CPT | Performed by: FAMILY MEDICINE

## 2024-11-26 PROCEDURE — 99000 SPECIMEN HANDLING OFFICE-LAB: CPT | Performed by: FAMILY MEDICINE

## 2024-11-26 PROCEDURE — 84439 ASSAY OF FREE THYROXINE: CPT | Performed by: FAMILY MEDICINE

## 2024-11-26 PROCEDURE — 85027 COMPLETE CBC AUTOMATED: CPT | Performed by: FAMILY MEDICINE

## 2024-11-26 RX ORDER — PANTOPRAZOLE SODIUM 40 MG/1
40 TABLET, DELAYED RELEASE ORAL DAILY
Qty: 90 TABLET | Refills: 3 | Status: SHIPPED | OUTPATIENT
Start: 2024-11-26

## 2024-11-26 RX ORDER — MIRTAZAPINE 7.5 MG/1
7.5 TABLET, FILM COATED ORAL AT BEDTIME
Qty: 90 TABLET | Refills: 3 | Status: SHIPPED | OUTPATIENT
Start: 2024-11-26

## 2024-11-26 RX ORDER — TRIAMCINOLONE ACETONIDE 1 MG/G
OINTMENT TOPICAL 2 TIMES DAILY
Qty: 80 G | Refills: 1 | Status: SHIPPED | OUTPATIENT
Start: 2024-11-26

## 2024-11-26 SDOH — HEALTH STABILITY: PHYSICAL HEALTH: ON AVERAGE, HOW MANY DAYS PER WEEK DO YOU ENGAGE IN MODERATE TO STRENUOUS EXERCISE (LIKE A BRISK WALK)?: 0 DAYS

## 2024-11-26 SDOH — HEALTH STABILITY: PHYSICAL HEALTH: ON AVERAGE, HOW MANY MINUTES DO YOU ENGAGE IN EXERCISE AT THIS LEVEL?: 0 MIN

## 2024-11-26 ASSESSMENT — SOCIAL DETERMINANTS OF HEALTH (SDOH): HOW OFTEN DO YOU GET TOGETHER WITH FRIENDS OR RELATIVES?: ONCE A WEEK

## 2024-11-26 ASSESSMENT — PAIN SCALES - GENERAL: PAINLEVEL_OUTOF10: NO PAIN (0)

## 2024-11-26 NOTE — PROGRESS NOTES
Preventive Care Visit  [facility]  April ANNETTE Long MD, Family Medicine  Nov 26, 2024  {Provider  Link to SmartSet :[id number]}    {PROVIDER CHARTING PREFERENCE:119243}    Lev Angeles is a 89 year old, presenting for the following:  Wellness Visit        11/26/2024     9:32 AM   Additional Questions   Roomed by Frances Alvarez, EMT-B   {ROOMER positive Fall Risk- Gait Speed Test is required click here to document the Gait Speed Test and then refresh the note to pull in results  :[id number]}  {ROOMER if patient is in their first year of Medicare a vision screen is required click here to document the Vison screen and then refresh the note to pull in results  :714843}      HPI    Does not check BP at home.    Eczema and neuropathy are his main issues.    Finding it is harder to get around, does not want to do PT for strengthening.  Uses a cane and a cart at home.    He does not eat at much as he used to.    Health Care Directive  Patient does not have a Health Care Directive: Discussed advance care planning with patient; however, patient declined at this time.      11/26/2024   General Health   How would you rate your overall physical health? Good   Feel stress (tense, anxious, or unable to sleep) Not at all            11/26/2024   Nutrition   Diet: Regular (no restrictions)            11/26/2024   Exercise   Days per week of moderate/strenous exercise 0 days   Average minutes spent exercising at this level 0 min      (!) EXERCISE CONCERN      11/26/2024   Social Factors   Frequency of gathering with friends or relatives Once a week   Worry food won't last until get money to buy more No   Food not last or not have enough money for food? No   Do you have housing? (Housing is defined as stable permanent housing and does not include staying ouside in a car, in a tent, in an abandoned building, in an overnight shelter, or couch-surfing.) Yes   Are you worried about losing your housing? No   Lack  of transportation? No   Unable to get utilities (heat,electricity)? No            2024   Fall Risk   Fallen 2 or more times in the past year? No    Trouble with walking or balance? Yes        Patient-reported     {Positive Fall Risk- Gait Speed Test is required and was not documented before note was started.  If results do not appear, ask staff to complete.  Once completed, refresh note to pull in results Click here to link Gait Speed Test  :[id number]}      2024   Activities of Daily Living- Home Safety   Needs help with the following daily activites None of the above   Safety concerns in the home None of the above            2024   Dental   Dentist two times every year? (!) NO            2024   Hearing Screening   Hearing concerns? None of the above            2024   Driving Risk Screening   Patient/family members have concerns about driving (!) YES             2024   General Alertness/Fatigue Screening   Have you been more tired than usual lately? No            2024   Urinary Incontinence Screening   Bothered by leaking urine in past 6 months No            2024   TB Screening   Were you born outside of the [place]? No        Today's PHQ-2 Score:       2024     9:32 AM   PHQ-2 (  [employer])   Q1: Little interest or pleasure in doing things 0    Q2: Feeling down, depressed or hopeless 0    PHQ-2 Score 0    Q1: Little interest or pleasure in doing things Not at all   Q2: Feeling down, depressed or hopeless Not at all   PHQ-2 Score 0       Patient-reported           2024   Substance Use   Alcohol more than 3/day or more than 7/wk No   Do you have a current opioid prescription? No   How severe/bad is pain from 1 to 10? 0/10 (No Pain)   Do you use any other substances recreationally? No        Social History     Tobacco Use    Smoking status: Former     Current packs/day: 0.00     Types: Cigarettes     Quit date: 1965     Years since quittin.9    Smokeless  tobacco: Never   Vaping Use    Vaping status: Never Used   Substance Use Topics    Alcohol use: Never    Drug use: No     {Provider  If there are gaps in the social history shown above, please follow the link to update and then refresh the note Link to Social and Substance History :[id number]}    {Provider  REQUIRED FOR AWV Use the storyboard to review patient history, after sections have been marked as reviewed, refresh note to capture documentation:402978}  {Provider   REQUIRED AWV use this link to review and update sexual activity history  after section has been marked as reviewed, refresh note to capture documentation:437879}  Reviewed and updated as needed this visit by Provider                    Past Medical History:   Diagnosis Date    Atrial fibrillation (H)     Carotid artery disease (H)     CKD (chronic kidney disease) stage 2, GFR 60-89 ml/min 10/12/2021    Coronary artery disease     History of odynophagia 09/18/2023    Hypertension     Hyponatremia 09/18/2023    Infection due to 2019 novel coronavirus 02/25/2023    Infrarenal abdominal aortic aneurysm (AAA) without rupture (H) 09/18/2023    Neuropathy 07/18/2023    Physical deconditioning 09/18/2023    Protein-calorie malnutrition (H) 09/18/2023    Significant closed head trauma within past 3 months     Unspecified cerebral artery occlusion with cerebral infarction     Weight loss 09/18/2023     Past Surgical History:   Procedure Laterality Date    COLONOSCOPY  9/24/2013    Dr. Yas LAMB    [facility] BYPASS GRAFT OTHR,CAROTID      carotid stent     Lab work is in process  Labs reviewed in EPIC  BP Readings from Last 3 Encounters:   11/26/24 (!) 142/86   02/19/24 116/62   11/22/23 138/84    Wt Readings from Last 3 Encounters:   11/26/24 87.1 kg (192 lb 1.6 oz)   02/19/24 94.9 kg (209 lb 4.8 oz)   11/22/23 91.2 kg (201 lb)                  Patient Active Problem List   Diagnosis    Generalized atherosclerosis    Carotid artery stenosis    Thoracic aortic  ectasia (H)    Localized edema    Hyperlipidemia LDL goal <100    DOMINGO (obstructive sleep apnea)    Essential hypertension with goal blood pressure less than 140/90    Pain in both feet    Coronary artery disease    CKD (chronic kidney disease) stage 2, GFR 60-89 ml/min    Generalized muscle weakness    Chronic a-fib (H)    Atypical chest pain    Neuropathy    Failure to thrive in adult    Anorexia    Intrinsic eczema    Hyponatremia    Physical deconditioning    Weight loss    Protein-calorie malnutrition (H)    Infrarenal abdominal aortic aneurysm (AAA) without rupture (H)    Coryza    Dysphagia, unspecified type    History of odynophagia    Chronic anticoagulation    Gastroesophageal reflux disease    Mesenteric artery stenosis (H)    Hypoalbuminemia    Globus sensation     Past Surgical History:   Procedure Laterality Date    [facility]      carotid stent       Social History     Tobacco Use    Smoking status: Former     Current packs/day: 0.00     Types: Cigarettes     Quit date: 1965     Years since quittin.9    Smokeless tobacco: Never   Substance Use Topics    Alcohol use: Never     Family History   Problem Relation Age of Onset    Heart Disease Mother     Diabetes Father     No Known Problems Brother     No Known Problems Sister     No Known Problems Brother     No Known Problems Sister     Breast Cancer No family hx of          Current Outpatient Medications   Medication Sig Dispense Refill    acetaminophen (TYLENOL) 500 MG tablet Take 1,000 mg by mouth      atenolol (TENORMIN) 50 MG tablet 1 tab in morning. 90 tablet 3    atorvastatin (LIPITOR) 20 MG tablet Take 1 tablet (20 mg) by mouth daily 90 tablet 3    cetirizine (ZYRTEC) 10 MG tablet Take 10 mg by mouth      clopidogrel (PLAVIX) 75 MG tablet Take 1 tablet (75 mg) by mouth daily 90 tablet 3    Emollient (HYDROPHOR) OINT Apply topically to the affected area(s) daily at bedtime.  Indications: skin care      furosemide (LASIX) 20 MG tablet Take 2 tablets (40 mg) by mouth daily. 120 tablet 3    lisinopril (ZESTRIL) 5 MG tablet Take 5 mg by mouth daily      mirtazapine (REMERON) 7.5 MG tablet Take 1 tablet (7.5 mg) by mouth at bedtime. 90 tablet 3    pantoprazole (PROTONIX) 40 MG EC tablet Take 1 tablet (40 mg) by mouth daily. 90 tablet 3    rivaroxaban ANTICOAGULANT (XARELTO) 15 MG TABS tablet Take 1 tablet (15 mg) by mouth daily (with dinner) 90 tablet 3    triamcinolone (KENALOG) 0.1 % external ointment Apply topically 2 times daily. 80 g 1     Allergies   Allergen Reactions    Epinephrine Shortness Of Breath     Very rapid heartrate    Epinephrine      Other reaction(s): Cardiac Arrest  Per pt and family    Dabigatran Itching     Recent Labs   Lab Test 11/22/23  1051 09/18/23  1328 03/02/23  0549 02/27/23  0523 02/26/23  0556 02/25/23  1416 02/24/23  0840 02/23/23  1342 11/08/22  1049 10/01/21  0841 10/01/21  0841 10/13/20  0920 08/21/19  0901 09/18/18  1009 06/20/17  1011   A1C 5.0  --   --   --   --   --   --   --   --   --   --   --   --  5.8*  --    LDL 63  --   --   --   --   --   --   --  65  --  42 37   < >  --  50   HDL 34*  --   --   --   --   --   --   --  46  --  30* 36*   < >  --  34*   TRIG 106  --   --   --   --   --   --   --  153*  --  192* 168*   < >  --  139   ALT  --  10  --   --   --  19  --  9*  --    < > 20 20   < >  --  19   CR  --  1.00 0.86 0.94  0.94   < > 1.24*   < > 1.25* 1.28*   < > 1.34* 1.16  --   --  1.09   GFRESTIMATED  --  73 84 78  78   < > 56*   < > 56* 55*   < > 48* 57*  --   --  65   GFRESTBLACK  --   --   --   --   --   --   --   --   --   --   --  66  --   --  78   POTASSIUM  --  5.0  --  3.7   < > 4.0   < > 4.3 4.6   < > 4.3 4.3  --   --  4.5   TSH  --  3.32  --   --   --   --   --   --   --   --   --   --   --  1.84  --     < > = values in this interval not displayed.      Current providers sharing in care for this patient include:  Patient Care  "Team:  Coming Giovana Long MD as PCP - General (Family Medicine)  Ventura Shields MD as Assigned Heart and Vascular Provider  Ventura Shields MD as MD (Cardiovascular Disease)  Coming Giovana Long MD as Assigned PCP    The following health maintenance items are reviewed in Epic and correct as of today:  Health Maintenance   Topic Date Due    ZOSTER IMMUNIZATION (1 of 2) Never done    DTAP/TDAP/TD IMMUNIZATION (1 - Tdap) 11/11/2009    RSV VACCINE (1 - 1-dose 75+ series) Never done    MICROALBUMIN  11/08/2023    INFLUENZA VACCINE (1) 09/01/2024    COVID-19 Vaccine (5 - 2024-25 season) 09/01/2024    BMP  09/18/2024    LIPID  11/22/2024    ANNUAL REVIEW OF HM ORDERS  11/22/2024    MEDICARE ANNUAL WELLNESS VISIT  11/22/2024    FALL RISK ASSESSMENT  11/26/2025    ADVANCE CARE PLANNING  11/22/2028    PHQ-2 (once per calendar year)  Completed    Pneumococcal Vaccine: 65+ Years  Completed    URINALYSIS  Completed    HPV IMMUNIZATION  Aged Out    MENINGITIS IMMUNIZATION  Aged Out    RSV MONOCLONAL ANTIBODY  Aged Out    CREATININE  Discontinued          Objective    Exam  BP (!) 142/86 (BP Location: Right arm, Patient Position: Sitting, Cuff Size: Adult Large)   Pulse 57   Temp 98.2  F (36.8  C) (Oral)   Resp 16   Ht 1.727 m (5' 8\")   Wt 87.1 kg (192 lb 1.6 oz)   SpO2 99%   BMI 29.21 kg/m     Estimated body mass index is 29.21 kg/m  as calculated from the following:    Height as of this encounter: 1.727 m (5' 8\").    Weight as of this encounter: 87.1 kg (192 lb 1.6 oz).    Physical Exam  GENERAL: alert and no distress  HENT: normal cephalic/atraumatic and ear canals and TM's normal  RESP: lungs clear to auscultation - no rales, rhonchi or wheezes  CV: irregularly irregular rhythm, no murmur, click or rub, and 1+ bilateral lower extremity pitting edema to shins    SKIN: venous stasis dermatitis with skin tears, occasional scabs on legs  PSYCH: affect normal/bright        11/26/2024 "   Mini Cog   Clock Draw Score 2 Normal   3 Item Recall 2 objects recalled   Mini Cog Total Score 4        {A Mini-Cog total score of 0-2 suggests the possibility of dementia, score of 3-5 suggests no dementia:328477}         Signed Electronically by: Giovana Long MD  {Email feedback regarding this note to [email or web address]   :[id number]}

## 2024-11-26 NOTE — PATIENT INSTRUCTIONS
Recommended Immunizations:  Tetanus (Tdap not Td)  RSV  Shingles (2)  COVID 24-25   (brittle bones).  Hepatitis C: Get tested at least once in your life.  STIs (sexually transmitted infections)  Before age 24: Ask your care team if you should be screened for STIs.  After age 24: Get screened for STIs if you're at risk. You are at risk for STIs (including HIV) if:  You are sexually active with more than one person.  You don't use condoms every time.  You or a partner was diagnosed with a sexually transmitted infection.  If you are at risk for HIV, ask about PrEP medicine to prevent HIV.  Get tested for HIV at least once in your life, whether you are at risk for HIV or not.  Cancer screening tests  Cervical cancer screening: If you have a cervix, begin getting regular cervical cancer screening tests starting at age 21.  Breast cancer scan (mammogram): If you've ever had breasts, begin having regular mammograms starting at age 40. This is a scan to check for breast cancer.  Colon cancer screening: It is important to start screening for colon cancer at age 45.  Have a colonoscopy test every 10 years (or more often if you're at risk) Or, ask your provider about stool tests like a FIT test every year or Cologuard test every 3 years.  To learn more about your testing options, visit:   .  For help making a decision, visit:   https://bit.ly/sw07147.  Prostate cancer screening test: If you have a prostate, ask your care team if a prostate cancer screening test (PSA) at age 55 is right for you.  Lung cancer screening: If you are a current or former smoker ages 50 to 80, ask your care team if ongoing lung cancer screenings are right for you.  For informational purposes only. Not to replace the advice of your health care provider. Copyright   2023 [employer]. All rights reserved. Clinically reviewed by the [facility] Transitions Program. [employer] 939835 - REV 01/24.  Preventing Falls: Care Instructions  Injuries and health problems such as trouble walking or poor eyesight can increase  your risk of falling. So can some medicines. But there are things you can do to help prevent falls. You can exercise to get stronger. You can also arrange your home to make it safer.    Talk to your doctor about the medicines you take. Ask if any of them increase the risk of falls and whether they can be changed or stopped.   Try to exercise regularly. It can help improve your strength and balance. This can help lower your risk of falling.         Practice fall safety and prevention.   Wear low-heeled shoes that fit well and give your feet good support. Talk to your doctor if you have foot problems that make this hard.  Carry a cellphone or wear a medical alert device that you can use to call for help.  Use stepladders instead of chairs to reach high objects. Don't climb if you're at risk for falls. Ask for help, if needed.  Wear the correct eyeglasses, if you need them.        Make your home safer.   Remove rugs, cords, clutter, and furniture from walkways.  Keep your house well lit. Use night-lights in hallways and bathrooms.  Install and use sturdy handrails on stairways.  Wear nonskid footwear, even inside. Don't walk barefoot or in socks without shoes.        Be safe outside.   Use handrails, curb cuts, and ramps whenever possible.  Keep your hands free by using a shoulder bag or backpack.  Try to walk in well-lit areas. Watch out for uneven ground, changes in pavement, and debris.  Be careful in the winter. Walk on the grass or gravel when sidewalks are slippery. Use de-icer on steps and walkways. Add non-slip devices to shoes.    Put grab bars and nonskid mats in your shower or tub and near the toilet. Try to use a shower chair or bath bench when bathing.   Get into a tub or shower by putting in your weaker leg first. Get out with your strong side first. Have a phone or medical alert device in the bathroom with you.   Where can you learn more?  Go to https://www.[employer].net/patiented  Enter G117 in the  "search box to learn more about \"Preventing Falls: Care Instructions.\"  Current as of: July 17, 2023  Content Version: 14.2 2024 [facility] [employer], [facility].   Care instructions adapted under license by your healthcare professional. If you have questions about a medical condition or this instruction, always ask your healthcare professional. [employer] disclaims any warranty or liability for your use of this information.       "

## 2024-11-27 DIAGNOSIS — R94.6 ABNORMAL FINDING ON THYROID FUNCTION TEST: Primary | ICD-10-CM

## 2024-11-27 DIAGNOSIS — E87.1 HYPONATREMIA: ICD-10-CM

## 2024-11-30 LAB — PYRIDOXAL PHOS SERPL-SCNC: 18.3 NMOL/L

## 2024-12-01 PROBLEM — K55.1 MESENTERIC ARTERY STENOSIS (H): Status: RESOLVED | Noted: 2023-09-06 | Resolved: 2024-12-01

## 2024-12-01 PROBLEM — I71.43 INFRARENAL ABDOMINAL AORTIC ANEURYSM (AAA) WITHOUT RUPTURE (H): Status: RESOLVED | Noted: 2023-09-18 | Resolved: 2024-12-01

## 2024-12-04 DIAGNOSIS — R63.0 ANOREXIA: ICD-10-CM

## 2024-12-05 RX ORDER — MIRTAZAPINE 7.5 MG/1
7.5 TABLET, FILM COATED ORAL AT BEDTIME
Qty: 90 TABLET | Refills: 3 | OUTPATIENT
Start: 2024-12-05

## 2025-02-17 NOTE — PROGRESS NOTES
CARDIOLOGY VISIT    REASON FOR VISIT: A-fib     SUBJECTIVE:  89-year-old male seen for follow-up of chronic atrial fibrillation.  He also has hypertension, TIA, carotid artery disease (left ICA stent in 2007), neuropathy, and sleep apnea.       He was diagnosed with A. fib in 2013 at the time of colonoscopy.  He has been on Xarelto 15mg daily (lower dose) and also because of his carotid disease, clopidogrel daily.       Echo 2019 (afib) showed EF 60%, mild RV dilation with normal function, 1+ TR, RVSP 31 mmHg, sinus of Valsalva 3.8 cm, ascending aorta 4.2 cm.        Carotid ultrasound November 2022 showed less than 50% stenosis of the right, patent left carotid stent with no restenosis.     Echo February 2023 showed EF 55%, no valve disease, aorta 4.1 cm.    He has been doing well.  He denies any palpitations, chest pain,or significant dyspnea.  He has mild edema which is unchanged.  He gets around slowly with a cane.      MEDICATIONS:  Current Outpatient Medications   Medication Sig Dispense Refill    acetaminophen (TYLENOL) 500 MG tablet Take 1,000 mg by mouth      atenolol (TENORMIN) 50 MG tablet 1 tab in morning. 90 tablet 3    atorvastatin (LIPITOR) 20 MG tablet Take 1 tablet (20 mg) by mouth daily 90 tablet 3    cetirizine (ZYRTEC) 10 MG tablet Take 10 mg by mouth      clopidogrel (PLAVIX) 75 MG tablet Take 1 tablet (75 mg) by mouth daily 90 tablet 3    Emollient (HYDROPHOR) OINT Apply topically to the affected area(s) daily at bedtime. Indications: skin care      furosemide (LASIX) 20 MG tablet Take 2 tablets (40 mg) by mouth daily. 120 tablet 3    lisinopril (ZESTRIL) 5 MG tablet Take 5 mg by mouth daily      mirtazapine (REMERON) 7.5 MG tablet Take 1 tablet (7.5 mg) by mouth at bedtime. 90 tablet 3    pantoprazole (PROTONIX) 40 MG EC tablet Take 1 tablet (40 mg) by mouth daily. 90 tablet 3    rivaroxaban ANTICOAGULANT (XARELTO) 15 MG TABS tablet Take 1 tablet (15 mg) by mouth daily (with dinner) 90 tablet 3  "   triamcinolone (KENALOG) 0.1 % external ointment Apply topically 2 times daily. 80 g 1     No current facility-administered medications for this visit.       ALLERGIES:  Allergies   Allergen Reactions    Epinephrine Shortness Of Breath     Very rapid heartrate    Epinephrine      Other reaction(s): Cardiac Arrest  Per pt and family    Dabigatran Itching       REVIEW OF SYSTEMS:  Constitutional:  No weight loss, fever, chills  HEENT:  Eyes:  No visual loss, blurred vision, double vision or yellow sclerae. No hearing loss, sneezing, congestion, runny nose or sore throat.  Skin:  No rash or itching.  Cardiovascular: per HPI  Respiratory: per HPI  GI:  No anorexia, nausea, vomiting or diarrhea. No abdominal pain or blood.  :  No dysurea, hematuria  Neurologic:  No headache, paralysis, ataxia, numbness or tingling in the extremities. No change in bowel or bladder control.  Musculoskeletal:  No muscle pain  Hematologic:  No bleeding or bruising.  Lymphatics:  No enlarged nodes. No history of splenectomy.  Endocrine:  No reports of sweating, cold or heat intolerance. No polyuria or polydipsia.  Allergies:  No history of asthma, hives, eczema or rhinitis.    PHYSICAL EXAM:  /77   Pulse 52   Ht 1.727 m (5' 8\")   Wt 87.7 kg (193 lb 4.8 oz)   SpO2 97%   BMI 29.39 kg/m    Constitutional: awake, alert, no distress  Eyes: PERRL, sclera nonicteric  ENT: trachea midline  Respiratory: lungs clear  Cardiovascular: Regular rate, totally irregular rhythm, no murmurs, trace ankle edema bilaterally   GI: nondistended, nontender, bowel sounds present  Lymph/Hematologic: no lymphadenopathy  Skin: dry, no rash  Musculoskeletal: good muscle tone, strength 5/5 in upper and lower extremities  Neurologic: no focal deficits  Neuropsychiatric: appropriate affact    DATA:  Lab:   November 2024: Potassium 4.9, creatinine 1.1  Recent Labs   Lab Test 11/26/24  1141 11/22/23  1051   CHOL 110 118   HDL 38* 34*   LDL 52 63   TRIG 101 106 "     ASSESSMENT:  89-year-old male seen for chronic A-fib.  His A-fib is well-controlled, he has no symptoms.  Medications to be kept the same.  He is on Xarelto and also clopidogrel because of his previous TIA and carotid stent.  Patient preferred not to do his carotid ultrasound, this can be revisited next year.    RECOMMENDATIONS:  1.  Chronic A-fib  - Continue atenolol and Xarelto    Follow-up in 1 year.    Ventura Shields MD  Cardiology - [facility]  Pager:  [phone]  Text Page  February 19, 2025

## 2025-02-19 ENCOUNTER — OFFICE VISIT (OUTPATIENT)
Dept: CARDIOLOGY | Facility: CLINIC | Age: 89
End: 2025-02-19
Payer: COMMERCIAL

## 2025-02-19 VITALS
WEIGHT: 193.3 LBS | BODY MASS INDEX: 29.3 KG/M2 | SYSTOLIC BLOOD PRESSURE: 136 MMHG | HEART RATE: 52 BPM | DIASTOLIC BLOOD PRESSURE: 77 MMHG | HEIGHT: 68 IN | OXYGEN SATURATION: 97 %

## 2025-02-19 DIAGNOSIS — I65.22 STENOSIS OF LEFT CAROTID ARTERY: ICD-10-CM

## 2025-02-19 DIAGNOSIS — I10 ESSENTIAL HYPERTENSION, BENIGN: ICD-10-CM

## 2025-02-19 DIAGNOSIS — I10 ESSENTIAL HYPERTENSION WITH GOAL BLOOD PRESSURE LESS THAN 140/90: ICD-10-CM

## 2025-02-19 DIAGNOSIS — E78.5 HYPERLIPIDEMIA LDL GOAL <100: ICD-10-CM

## 2025-02-19 DIAGNOSIS — I10 HYPERTENSION GOAL BP (BLOOD PRESSURE) < 140/90: ICD-10-CM

## 2025-02-19 DIAGNOSIS — G47.33 OSA (OBSTRUCTIVE SLEEP APNEA): ICD-10-CM

## 2025-02-19 DIAGNOSIS — I48.20 CHRONIC ATRIAL FIBRILLATION (H): ICD-10-CM

## 2025-02-19 PROCEDURE — 99214 OFFICE O/P EST MOD 30 MIN: CPT | Performed by: INTERNAL MEDICINE

## 2025-02-19 RX ORDER — ATORVASTATIN CALCIUM 20 MG/1
20 TABLET, FILM COATED ORAL DAILY
Qty: 90 TABLET | Refills: 3 | Status: SHIPPED | OUTPATIENT
Start: 2025-02-19

## 2025-02-19 RX ORDER — ATENOLOL 50 MG/1
TABLET ORAL
Qty: 90 TABLET | Refills: 3 | Status: SHIPPED | OUTPATIENT
Start: 2025-02-19

## 2025-02-19 RX ORDER — LISINOPRIL 5 MG/1
5 TABLET ORAL DAILY
Qty: 90 TABLET | Refills: 3 | Status: SHIPPED | OUTPATIENT
Start: 2025-02-19

## 2025-02-19 RX ORDER — FUROSEMIDE 20 MG/1
40 TABLET ORAL DAILY
Qty: 120 TABLET | Refills: 3 | Status: SHIPPED | OUTPATIENT
Start: 2025-02-19

## 2025-02-19 RX ORDER — CLOPIDOGREL BISULFATE 75 MG/1
75 TABLET ORAL DAILY
Qty: 90 TABLET | Refills: 3 | Status: SHIPPED | OUTPATIENT
Start: 2025-02-19

## 2025-02-19 NOTE — LETTER
2/19/2025 April ANNETTE Long MD  [address] [facility] [address]    RE: Issac Zelaya       Dear Colleague,     I had the pleasure of seeing Issac KASHMIR Zelaya in the [facility].  CARDIOLOGY VISIT    REASON FOR VISIT: A-fib     SUBJECTIVE:  89-year-old male seen for follow-up of chronic atrial fibrillation.  He also has hypertension, TIA, carotid artery disease (left ICA stent in 2007), neuropathy, and sleep apnea.       He was diagnosed with A. fib in 2013 at the time of colonoscopy.  He has been on Xarelto 15mg daily (lower dose) and also because of his carotid disease, clopidogrel daily.       Echo 2019 (afib) showed EF 60%, mild RV dilation with normal function, 1+ TR, RVSP 31 mmHg, sinus of Valsalva 3.8 cm, ascending aorta 4.2 cm.        Carotid ultrasound November 2022 showed less than 50% stenosis of the right, patent left carotid stent with no restenosis.     Echo February 2023 showed EF 55%, no valve disease, aorta 4.1 cm.    He has been doing well.  He denies any palpitations, chest pain,or significant dyspnea.  He has mild edema which is unchanged.  He gets around slowly with a cane.      MEDICATIONS:  Current Outpatient Medications   Medication Sig Dispense Refill     acetaminophen (TYLENOL) 500 MG tablet Take 1,000 mg by mouth       atenolol (TENORMIN) 50 MG tablet 1 tab in morning. 90 tablet 3     atorvastatin (LIPITOR) 20 MG tablet Take 1 tablet (20 mg) by mouth daily 90 tablet 3     cetirizine (ZYRTEC) 10 MG tablet Take 10 mg by mouth       clopidogrel (PLAVIX) 75 MG tablet Take 1 tablet (75 mg) by mouth daily 90 tablet 3     Emollient (HYDROPHOR) OINT Apply topically to the affected area(s) daily at bedtime. Indications: skin care       furosemide (LASIX) 20 MG tablet Take 2 tablets (40 mg) by mouth daily. 120 tablet 3     lisinopril (ZESTRIL) 5 MG tablet Take 5 mg by mouth daily       mirtazapine (REMERON) 7.5 MG tablet Take 1 tablet (7.5 mg) by mouth at bedtime. 90  "tablet 3     pantoprazole (PROTONIX) 40 MG EC tablet Take 1 tablet (40 mg) by mouth daily. 90 tablet 3     rivaroxaban ANTICOAGULANT (XARELTO) 15 MG TABS tablet Take 1 tablet (15 mg) by mouth daily (with dinner) 90 tablet 3     triamcinolone (KENALOG) 0.1 % external ointment Apply topically 2 times daily. 80 g 1     No current facility-administered medications for this visit.       ALLERGIES:  Allergies   Allergen Reactions     Epinephrine Shortness Of Breath     Very rapid heartrate     Epinephrine      Other reaction(s): Cardiac Arrest  Per pt and family     Dabigatran Itching       REVIEW OF SYSTEMS:  Constitutional:  No weight loss, fever, chills  HEENT:  Eyes:  No visual loss, blurred vision, double vision or yellow sclerae. No hearing loss, sneezing, congestion, runny nose or sore throat.  Skin:  No rash or itching.  Cardiovascular: per HPI  Respiratory: per HPI  GI:  No anorexia, nausea, vomiting or diarrhea. No abdominal pain or blood.  :  No dysurea, hematuria  Neurologic:  No headache, paralysis, ataxia, numbness or tingling in the extremities. No change in bowel or bladder control.  Musculoskeletal:  No muscle pain  Hematologic:  No bleeding or bruising.  Lymphatics:  No enlarged nodes. No history of splenectomy.  Endocrine:  No reports of sweating, cold or heat intolerance. No polyuria or polydipsia.  Allergies:  No history of asthma, hives, eczema or rhinitis.    PHYSICAL EXAM:  /77   Pulse 52   Ht 1.727 m (5' 8\")   Wt 87.7 kg (193 lb 4.8 oz)   SpO2 97%   BMI 29.39 kg/m    Constitutional: awake, alert, no distress  Eyes: PERRL, sclera nonicteric  ENT: trachea midline  Respiratory: lungs clear  Cardiovascular: Regular rate, totally irregular rhythm, no murmurs, trace ankle edema bilaterally   GI: nondistended, nontender, bowel sounds present  Lymph/Hematologic: no lymphadenopathy  Skin: dry, no rash  Musculoskeletal: good muscle tone, strength 5/5 in upper and lower extremities  Neurologic: " no focal deficits  Neuropsychiatric: appropriate affact    DATA:  Lab:   November 2024: Potassium 4.9, creatinine 1.1  Recent Labs   Lab Test 11/26/24  1141 11/22/23  1051   CHOL 110 118   HDL 38* 34*   LDL 52 63   TRIG 101 106     ASSESSMENT:  89-year-old male seen for chronic A-fib.  His A-fib is well-controlled, he has no symptoms.  Medications to be kept the same.  He is on Xarelto and also clopidogrel because of his previous TIA and carotid stent.  Patient preferred not to do his carotid ultrasound, this can be revisited next year.    RECOMMENDATIONS:  1.  Chronic A-fib  - Continue atenolol and Xarelto    Follow-up in 1 year.    Ventura Shields MD  Cardiology - [facility]  Pager:  [phone]  Text Page  February 19, 2025        Thank you for allowing me to participate in the care of your patient.      Sincerely,     Ventura Shields MD     [facility]  cc:   Ventura Shields MD  No address on file

## 2025-02-24 DIAGNOSIS — R63.0 ANOREXIA: ICD-10-CM

## 2025-02-24 DIAGNOSIS — R13.10 DYSPHAGIA, UNSPECIFIED TYPE: ICD-10-CM

## 2025-02-24 RX ORDER — PANTOPRAZOLE SODIUM 40 MG/1
40 TABLET, DELAYED RELEASE ORAL DAILY
Qty: 90 TABLET | Refills: 2 | Status: SHIPPED | OUTPATIENT
Start: 2025-02-24

## 2025-02-24 RX ORDER — MIRTAZAPINE 7.5 MG/1
7.5 TABLET, FILM COATED ORAL AT BEDTIME
Qty: 90 TABLET | Refills: 2 | Status: SHIPPED | OUTPATIENT
Start: 2025-02-24

## 2025-02-24 NOTE — TELEPHONE ENCOUNTER
Refill Team   Please resend pended rx's to local pharmacy, no longer using mail order     Sisi Matta, Registered Nurse  [facility]

## 2025-06-16 ENCOUNTER — OFFICE VISIT (OUTPATIENT)
Dept: FAMILY MEDICINE | Facility: CLINIC | Age: 89
End: 2025-06-16
Payer: COMMERCIAL

## 2025-06-16 ENCOUNTER — RESULTS FOLLOW-UP (OUTPATIENT)
Dept: FAMILY MEDICINE | Facility: CLINIC | Age: 89
End: 2025-06-16

## 2025-06-16 VITALS
TEMPERATURE: 97.6 F | HEART RATE: 72 BPM | DIASTOLIC BLOOD PRESSURE: 76 MMHG | RESPIRATION RATE: 20 BRPM | OXYGEN SATURATION: 99 % | WEIGHT: 189.3 LBS | SYSTOLIC BLOOD PRESSURE: 119 MMHG | BODY MASS INDEX: 28.78 KG/M2

## 2025-06-16 DIAGNOSIS — Z95.0 PACEMAKER: ICD-10-CM

## 2025-06-16 DIAGNOSIS — R06.02 SOB (SHORTNESS OF BREATH): ICD-10-CM

## 2025-06-16 DIAGNOSIS — I48.20 CHRONIC A-FIB (H): ICD-10-CM

## 2025-06-16 DIAGNOSIS — R79.89 ELEVATED BRAIN NATRIURETIC PEPTIDE (BNP) LEVEL: ICD-10-CM

## 2025-06-16 DIAGNOSIS — J96.01 ACUTE RESPIRATORY FAILURE WITH HYPOXIA (H): ICD-10-CM

## 2025-06-16 DIAGNOSIS — Z09 HOSPITAL DISCHARGE FOLLOW-UP: Primary | ICD-10-CM

## 2025-06-16 LAB
ANION GAP SERPL CALCULATED.3IONS-SCNC: 11 MMOL/L (ref 7–15)
BUN SERPL-MCNC: 17.1 MG/DL (ref 8–23)
CALCIUM SERPL-MCNC: 9.5 MG/DL (ref 8.8–10.4)
CHLORIDE SERPL-SCNC: 101 MMOL/L (ref 98–107)
CREAT SERPL-MCNC: 1.21 MG/DL (ref 0.67–1.17)
EGFRCR SERPLBLD CKD-EPI 2021: 57 ML/MIN/1.73M2
ERYTHROCYTE [DISTWIDTH] IN BLOOD BY AUTOMATED COUNT: 13.5 % (ref 10–15)
GLUCOSE SERPL-MCNC: 101 MG/DL (ref 70–99)
HCO3 SERPL-SCNC: 26 MMOL/L (ref 22–29)
HCT VFR BLD AUTO: 36.8 % (ref 40–53)
HGB BLD-MCNC: 12.5 G/DL (ref 13.3–17.7)
MCH RBC QN AUTO: 33.5 PG (ref 26.5–33)
MCHC RBC AUTO-ENTMCNC: 34 G/DL (ref 31.5–36.5)
MCV RBC AUTO: 99 FL (ref 78–100)
PLATELET # BLD AUTO: 168 10E3/UL (ref 150–450)
POTASSIUM SERPL-SCNC: 4.2 MMOL/L (ref 3.4–5.3)
RBC # BLD AUTO: 3.73 10E6/UL (ref 4.4–5.9)
SODIUM SERPL-SCNC: 138 MMOL/L (ref 135–145)
WBC # BLD AUTO: 6.9 10E3/UL (ref 4–11)

## 2025-06-16 PROCEDURE — 36415 COLL VENOUS BLD VENIPUNCTURE: CPT

## 2025-06-16 PROCEDURE — 80048 BASIC METABOLIC PNL TOTAL CA: CPT

## 2025-06-16 PROCEDURE — 99213 OFFICE O/P EST LOW 20 MIN: CPT

## 2025-06-16 PROCEDURE — 3078F DIAST BP <80 MM HG: CPT

## 2025-06-16 PROCEDURE — 3074F SYST BP LT 130 MM HG: CPT

## 2025-06-16 PROCEDURE — 85027 COMPLETE CBC AUTOMATED: CPT

## 2025-06-16 PROCEDURE — 1111F DSCHRG MED/CURRENT MED MERGE: CPT

## 2025-06-16 NOTE — PROGRESS NOTES
"  Assessment & Plan     (Z09) Hospital discharge follow-up  (primary encounter diagnosis)  (Z95.0) Pacemaker  (R06.02) SOB (shortness of breath)  (R79.89) Elevated brain natriuretic peptide (BNP) level  (J96.01) Acute respiratory failure with hypoxia (H)  Comment: patient feeling much better. VSS today. Patient has cardiology follow up in 8 weeks. PPM site looking quite good, healing appropriately. Patient ROM adequate. Discussed w/ patient to wait ~ 4 weeks before driving. 4-6 weeks before routinely putting arms above head. Will get BMP and CBC to trend, last Cr elevated. Suspect could be related cardiorenal affect and IV dye from procedure. Will follow up on results and whether further direction is necessary. Patient fully understands and is agreeable with plan of care, at this point patient will follow up as needed unless acute concerns arise in the meantime.  Plan: Basic metabolic panel  (Ca, Cl, CO2, Creat,         Gluc, K, Na, BUN), CBC with platelets      MED REC REQUIRED  Post Medication Reconciliation Status: discharge medications reconciled, continue medications without change  BMI  Estimated body mass index is 28.78 kg/m  as calculated from the following:    Height as of 2/19/25: 1.727 m (5' 8\").    Weight as of this encounter: 85.9 kg (189 lb 4.8 oz).     Lev Angeles is a 89 year old, presenting for the following health issues:  Hospital F/U        6/16/2025     9:16 AM   Additional Questions   Roomed by Jovana   Accompanied by Wife and Son     [facility]      Hospital Follow-up Visit:    Hospital/Nursing Home/IP Rehab Facility: [facility] [place]   Date of Admission: 6/01/2025  Date of Discharge: 6/04/2025  Reason(s) for Admission: SOB  Do you have any other stressors you would like to discuss with your provider? No    Problems taking medications regularly:  None  Medication changes since discharge: Acetaminophen Extra Strength 500 MG, triamcinolone acetonide 0.1 % ointment and aquaphor ointment   Problems " adhering to non-medication therapy:  None    Summary of hospitalization:  CareEverywhere information obtained and reviewed  Diagnostic Tests/Treatments reviewed.  Follow up needed: BMP and CBC  Other Healthcare Providers Involved in Patient s Care:         Specialist appointment - Cardiology follow up in 8 weeks  Update since discharge: improved.       Plan of care communicated with patient and family    Review of Systems  Constitutional, HEENT, cardiovascular, pulmonary, skin systems are negative, except as otherwise noted.      Objective    /76 (BP Location: Left arm, Patient Position: Sitting, Cuff Size: Adult Large)   Pulse 72   Temp 97.6  F (36.4  C) (Oral)   Resp 20   Wt 85.9 kg (189 lb 4.8 oz)   SpO2 99%   BMI 28.78 kg/m    Body mass index is 28.78 kg/m .  Physical Exam  Vitals and nursing note reviewed.   Constitutional:       General: He is not in acute distress.     Appearance: Normal appearance. He is not ill-appearing.   Cardiovascular:      Rate and Rhythm: Normal rate and regular rhythm.      Heart sounds: No murmur heard.     No friction rub. No gallop.   Pulmonary:      Effort: Pulmonary effort is normal. No respiratory distress.      Breath sounds: No wheezing, rhonchi or rales.   Skin:     General: Skin is warm and dry.      Comments: Surgical incision to left upper chest. Brusing looks to be resolving but still present. Incision well approximated, no concerns for infection noted.    Neurological:      Mental Status: He is alert.   Psychiatric:         Mood and Affect: Mood normal.         Behavior: Behavior normal. Behavior is cooperative.         Thought Content: Thought content normal.         Judgment: Judgment normal.          Signed Electronically by: DARREN Vyas CNP

## 2025-07-24 ENCOUNTER — LAB REQUISITION (OUTPATIENT)
Dept: LAB | Facility: CLINIC | Age: 89
End: 2025-07-24
Payer: COMMERCIAL

## 2025-07-24 DIAGNOSIS — E87.1 HYPO-OSMOLALITY AND HYPONATREMIA: ICD-10-CM
